# Patient Record
Sex: MALE | Race: WHITE | NOT HISPANIC OR LATINO | Employment: OTHER | URBAN - METROPOLITAN AREA
[De-identification: names, ages, dates, MRNs, and addresses within clinical notes are randomized per-mention and may not be internally consistent; named-entity substitution may affect disease eponyms.]

---

## 2024-11-08 ENCOUNTER — HOSPITAL ENCOUNTER (INPATIENT)
Facility: HOSPITAL | Age: 64
End: 2024-11-08
Attending: EMERGENCY MEDICINE | Admitting: STUDENT IN AN ORGANIZED HEALTH CARE EDUCATION/TRAINING PROGRAM
Payer: MEDICARE

## 2024-11-08 ENCOUNTER — APPOINTMENT (OUTPATIENT)
Dept: RADIOLOGY | Facility: HOSPITAL | Age: 64
DRG: 100 | End: 2024-11-08
Payer: MEDICARE

## 2024-11-08 ENCOUNTER — CLINICAL SUPPORT (OUTPATIENT)
Dept: EMERGENCY MEDICINE | Facility: HOSPITAL | Age: 64
DRG: 100 | End: 2024-11-08
Payer: MEDICARE

## 2024-11-08 ENCOUNTER — APPOINTMENT (OUTPATIENT)
Dept: NEUROLOGY | Facility: HOSPITAL | Age: 64
DRG: 100 | End: 2024-11-08
Payer: MEDICARE

## 2024-11-08 DIAGNOSIS — I10 HYPERTENSION, UNSPECIFIED TYPE: ICD-10-CM

## 2024-11-08 DIAGNOSIS — R56.9 SEIZURES (MULTI): ICD-10-CM

## 2024-11-08 DIAGNOSIS — R06.6 HICCUPS: ICD-10-CM

## 2024-11-08 DIAGNOSIS — R06.2 WHEEZING: ICD-10-CM

## 2024-11-08 DIAGNOSIS — R56.9 NEW ONSET SEIZURE (MULTI): Primary | ICD-10-CM

## 2024-11-08 LAB
ALBUMIN SERPL BCP-MCNC: 4.3 G/DL (ref 3.4–5)
ALP SERPL-CCNC: 123 U/L (ref 33–136)
ALT SERPL W P-5'-P-CCNC: 3 U/L (ref 10–52)
ANION GAP BLDV CALCULATED.4IONS-SCNC: 8 MMOL/L (ref 10–25)
ANION GAP SERPL CALC-SCNC: 14 MMOL/L (ref 10–20)
APTT PPP: 32 SECONDS (ref 27–38)
AST SERPL W P-5'-P-CCNC: 12 U/L (ref 9–39)
BASE EXCESS BLDV CALC-SCNC: 1.8 MMOL/L (ref -2–3)
BASOPHILS # BLD AUTO: 0.08 X10*3/UL (ref 0–0.1)
BASOPHILS NFR BLD AUTO: 1.3 %
BILIRUB SERPL-MCNC: 1.1 MG/DL (ref 0–1.2)
BODY TEMPERATURE: 37 DEGREES CELSIUS
BUN SERPL-MCNC: 10 MG/DL (ref 6–23)
CA-I BLDV-SCNC: 1.18 MMOL/L (ref 1.1–1.33)
CALCIUM SERPL-MCNC: 9.3 MG/DL (ref 8.6–10.6)
CARDIAC TROPONIN I PNL SERPL HS: 5 NG/L (ref 0–53)
CHLORIDE BLDV-SCNC: 102 MMOL/L (ref 98–107)
CHLORIDE SERPL-SCNC: 103 MMOL/L (ref 98–107)
CO2 SERPL-SCNC: 27 MMOL/L (ref 21–32)
CREAT SERPL-MCNC: 0.91 MG/DL (ref 0.5–1.3)
EGFRCR SERPLBLD CKD-EPI 2021: >90 ML/MIN/1.73M*2
EOSINOPHIL # BLD AUTO: 0.09 X10*3/UL (ref 0–0.7)
EOSINOPHIL NFR BLD AUTO: 1.5 %
ERYTHROCYTE [DISTWIDTH] IN BLOOD BY AUTOMATED COUNT: 12 % (ref 11.5–14.5)
GLUCOSE BLD MANUAL STRIP-MCNC: 90 MG/DL (ref 74–99)
GLUCOSE BLDV-MCNC: 98 MG/DL (ref 74–99)
GLUCOSE SERPL-MCNC: 91 MG/DL (ref 74–99)
HCO3 BLDV-SCNC: 29.1 MMOL/L (ref 22–26)
HCT VFR BLD AUTO: 45.1 % (ref 41–52)
HCT VFR BLD EST: 50 % (ref 41–52)
HGB BLD-MCNC: 16.4 G/DL (ref 13.5–17.5)
HGB BLDV-MCNC: 16.7 G/DL (ref 13.5–17.5)
HOLD SPECIMEN: NORMAL
IMM GRANULOCYTES # BLD AUTO: 0.02 X10*3/UL (ref 0–0.7)
IMM GRANULOCYTES NFR BLD AUTO: 0.3 % (ref 0–0.9)
INR PPP: 1.1 (ref 0.9–1.1)
LACTATE BLDV-SCNC: 1.8 MMOL/L (ref 0.4–2)
LYMPHOCYTES # BLD AUTO: 1.45 X10*3/UL (ref 1.2–4.8)
LYMPHOCYTES NFR BLD AUTO: 24.3 %
MCH RBC QN AUTO: 31.6 PG (ref 26–34)
MCHC RBC AUTO-ENTMCNC: 36.4 G/DL (ref 32–36)
MCV RBC AUTO: 87 FL (ref 80–100)
MONOCYTES # BLD AUTO: 0.39 X10*3/UL (ref 0.1–1)
MONOCYTES NFR BLD AUTO: 6.5 %
NEUTROPHILS # BLD AUTO: 3.93 X10*3/UL (ref 1.2–7.7)
NEUTROPHILS NFR BLD AUTO: 66.1 %
NRBC BLD-RTO: 0 /100 WBCS (ref 0–0)
OXYHGB MFR BLDV: 59.2 % (ref 45–75)
P OFFSET: 162 MS
P ONSET: 122 MS
PCO2 BLDV: 54 MM HG (ref 41–51)
PH BLDV: 7.34 PH (ref 7.33–7.43)
PLATELET # BLD AUTO: 220 X10*3/UL (ref 150–450)
PO2 BLDV: 37 MM HG (ref 35–45)
POTASSIUM BLDV-SCNC: 3.5 MMOL/L (ref 3.5–5.3)
POTASSIUM SERPL-SCNC: 3.6 MMOL/L (ref 3.5–5.3)
PROT SERPL-MCNC: 7.3 G/DL (ref 6.4–8.2)
PROTHROMBIN TIME: 12.1 SECONDS (ref 9.8–12.8)
Q ONSET: 222 MS
QRS COUNT: 12 BEATS
QRS DURATION: 82 MS
QT INTERVAL: 440 MS
QTC CALCULATION(BAZETT): 495 MS
QTC FREDERICIA: 475 MS
R AXIS: -29 DEGREES
RBC # BLD AUTO: 5.19 X10*6/UL (ref 4.5–5.9)
SAO2 % BLDV: 61 % (ref 45–75)
SODIUM BLDV-SCNC: 136 MMOL/L (ref 136–145)
SODIUM SERPL-SCNC: 140 MMOL/L (ref 136–145)
T AXIS: 54 DEGREES
T OFFSET: 442 MS
VENTRICULAR RATE: 76 BPM
WBC # BLD AUTO: 6 X10*3/UL (ref 4.4–11.3)

## 2024-11-08 PROCEDURE — 85610 PROTHROMBIN TIME: CPT

## 2024-11-08 PROCEDURE — 70450 CT HEAD/BRAIN W/O DYE: CPT

## 2024-11-08 PROCEDURE — 2500000004 HC RX 250 GENERAL PHARMACY W/ HCPCS (ALT 636 FOR OP/ED)

## 2024-11-08 PROCEDURE — 93010 ELECTROCARDIOGRAM REPORT: CPT

## 2024-11-08 PROCEDURE — 96375 TX/PRO/DX INJ NEW DRUG ADDON: CPT

## 2024-11-08 PROCEDURE — 84132 ASSAY OF SERUM POTASSIUM: CPT

## 2024-11-08 PROCEDURE — 99285 EMERGENCY DEPT VISIT HI MDM: CPT | Mod: 25

## 2024-11-08 PROCEDURE — 71045 X-RAY EXAM CHEST 1 VIEW: CPT | Performed by: RADIOLOGY

## 2024-11-08 PROCEDURE — 84484 ASSAY OF TROPONIN QUANT: CPT

## 2024-11-08 PROCEDURE — 99291 CRITICAL CARE FIRST HOUR: CPT | Mod: 25 | Performed by: EMERGENCY MEDICINE

## 2024-11-08 PROCEDURE — 85025 COMPLETE CBC W/AUTO DIFF WBC: CPT

## 2024-11-08 PROCEDURE — C9254 INJECTION, LACOSAMIDE: HCPCS

## 2024-11-08 PROCEDURE — 95816 EEG AWAKE AND DROWSY: CPT

## 2024-11-08 PROCEDURE — 82947 ASSAY GLUCOSE BLOOD QUANT: CPT

## 2024-11-08 PROCEDURE — 85730 THROMBOPLASTIN TIME PARTIAL: CPT

## 2024-11-08 PROCEDURE — 99285 EMERGENCY DEPT VISIT HI MDM: CPT | Performed by: EMERGENCY MEDICINE

## 2024-11-08 PROCEDURE — 95816 EEG AWAKE AND DROWSY: CPT | Performed by: STUDENT IN AN ORGANIZED HEALTH CARE EDUCATION/TRAINING PROGRAM

## 2024-11-08 PROCEDURE — 36415 COLL VENOUS BLD VENIPUNCTURE: CPT

## 2024-11-08 PROCEDURE — 0042T CT PERFUSION W IV CONTRAST: CPT

## 2024-11-08 PROCEDURE — 93005 ELECTROCARDIOGRAM TRACING: CPT

## 2024-11-08 PROCEDURE — 84075 ASSAY ALKALINE PHOSPHATASE: CPT

## 2024-11-08 PROCEDURE — 70498 CT ANGIOGRAPHY NECK: CPT

## 2024-11-08 PROCEDURE — 2060000001 HC INTERMEDIATE ICU ROOM DAILY

## 2024-11-08 PROCEDURE — 2550000001 HC RX 255 CONTRASTS: Performed by: EMERGENCY MEDICINE

## 2024-11-08 PROCEDURE — 96365 THER/PROPH/DIAG IV INF INIT: CPT

## 2024-11-08 PROCEDURE — 71045 X-RAY EXAM CHEST 1 VIEW: CPT

## 2024-11-08 PROCEDURE — 99223 1ST HOSP IP/OBS HIGH 75: CPT

## 2024-11-08 RX ORDER — AMLODIPINE BESYLATE 10 MG/1
10 TABLET ORAL DAILY
Status: DISPENSED | OUTPATIENT
Start: 2024-11-08

## 2024-11-08 RX ORDER — ENOXAPARIN SODIUM 100 MG/ML
40 INJECTION SUBCUTANEOUS EVERY 24 HOURS
Status: DISPENSED | OUTPATIENT
Start: 2024-11-08

## 2024-11-08 RX ORDER — ACETAMINOPHEN 325 MG/1
650 TABLET ORAL EVERY 4 HOURS PRN
Status: ACTIVE | OUTPATIENT
Start: 2024-11-08

## 2024-11-08 RX ORDER — ACETAMINOPHEN 160 MG/5ML
650 SOLUTION ORAL EVERY 4 HOURS PRN
Status: ACTIVE | OUTPATIENT
Start: 2024-11-08

## 2024-11-08 RX ORDER — MIRTAZAPINE 7.5 MG/1
7.5 TABLET, FILM COATED ORAL NIGHTLY
Status: ON HOLD | COMMUNITY

## 2024-11-08 RX ORDER — LEVETIRACETAM 10 MG/ML
1000 INJECTION INTRAVASCULAR EVERY 12 HOURS
Status: DISCONTINUED | OUTPATIENT
Start: 2024-11-08 | End: 2024-11-10

## 2024-11-08 RX ORDER — LISINOPRIL 10 MG/1
10 TABLET ORAL DAILY
Status: ON HOLD | COMMUNITY

## 2024-11-08 RX ORDER — ASPIRIN 81 MG/1
81 TABLET ORAL DAILY
Status: ON HOLD | COMMUNITY

## 2024-11-08 RX ORDER — LEVETIRACETAM 15 MG/ML
1500 INJECTION INTRAVASCULAR ONCE
Status: COMPLETED | OUTPATIENT
Start: 2024-11-08 | End: 2024-11-08

## 2024-11-08 RX ORDER — LORAZEPAM 2 MG/ML
INJECTION INTRAMUSCULAR
Status: COMPLETED
Start: 2024-11-08 | End: 2024-11-08

## 2024-11-08 RX ORDER — LEVETIRACETAM 5 MG/ML
500 INJECTION INTRAVASCULAR EVERY 12 HOURS
Status: DISCONTINUED | OUTPATIENT
Start: 2024-11-08 | End: 2024-11-08

## 2024-11-08 RX ORDER — LABETALOL HYDROCHLORIDE 5 MG/ML
10 INJECTION, SOLUTION INTRAVENOUS EVERY 10 MIN PRN
Status: DISPENSED | OUTPATIENT
Start: 2024-11-08

## 2024-11-08 RX ORDER — LEVETIRACETAM 15 MG/ML
INJECTION INTRAVASCULAR
Status: COMPLETED
Start: 2024-11-08 | End: 2024-11-08

## 2024-11-08 RX ORDER — POLYETHYLENE GLYCOL 3350 17 G/17G
17 POWDER, FOR SOLUTION ORAL DAILY
Status: DISPENSED | OUTPATIENT
Start: 2024-11-08

## 2024-11-08 RX ORDER — ASPIRIN 81 MG/1
81 TABLET ORAL DAILY
Status: DISPENSED | OUTPATIENT
Start: 2024-11-08

## 2024-11-08 RX ORDER — LISINOPRIL 10 MG/1
10 TABLET ORAL DAILY
Status: DISPENSED | OUTPATIENT
Start: 2024-11-08

## 2024-11-08 RX ORDER — HYDRALAZINE HYDROCHLORIDE 20 MG/ML
10 INJECTION INTRAMUSCULAR; INTRAVENOUS EVERY 10 MIN PRN
Status: DISPENSED | OUTPATIENT
Start: 2024-11-08

## 2024-11-08 RX ORDER — LORAZEPAM 2 MG/ML
2 INJECTION INTRAMUSCULAR EVERY 5 MIN PRN
Status: ACTIVE | OUTPATIENT
Start: 2024-11-08

## 2024-11-08 RX ORDER — LORAZEPAM 2 MG/ML
4 INJECTION INTRAMUSCULAR ONCE
Status: COMPLETED | OUTPATIENT
Start: 2024-11-08 | End: 2024-11-08

## 2024-11-08 RX ORDER — AMLODIPINE BESYLATE 10 MG/1
10 TABLET ORAL DAILY
Status: ON HOLD | COMMUNITY

## 2024-11-08 NOTE — SIGNIFICANT EVENT
STROKE NEUROLOGY CANCELLED BRAIN ATTACK (BAT) NOTE  Brain Attack called at 12:51 AM out of concern for acute onset aphasia that began this morning. Stroke team arrived at 12:54. LKW was determined to be 9am per daughter . BP was 188/121; BG was 90. Rapid neurologic evaluation revealed NIHSS 14 (breakdown below), however of note, patient has history of R MCA territory ischemic infarct in past and has residual weakness of Left upper and lower extremity. Patient was taken for CTH/CTA head and neck, no acute intracranial abnormalities appreciated however, did see hypodensities c/f chronic R MCA territory infarct as well as left PCA territory infarct given concern for possible infarct, CTP performed and unremarkable.     The patient was not a candidate for IV thrombolysis given unclear last known well, low concern for acute ischemic stroke and already significant chronic disability from prior infarct . Likewise not a candidate for endovascular mechanical thrombectomy given no evidence for LVO. Given no role for acute neurologic intervention, the Brain Attack protocol was cancelled. Clinical reasoning explained to the primary team, who agree with this plan.    Clinical picture gave rise to concern for a post-ictal aphasia although no seizure like activity reported per EMS family. Shortly after cancellation of BAT, generalized convulsions witnessed concerning for seizure activity by ED team, spontaneously resolved not requiring ativan. Will be seen by epilepsy team for seizure concern.    Linda Thomas MD PGY-3  [facility] Vascular Neurology & Neuro-Stroke Team  Stroke Team Pager [phone]      NIHSS:  1a  Level of consciousness: 0=alert; keenly responsive   1b. LOC questions:  2=Performs neither task correctly   1c. LOC commands: 2=Performs neither task correctly   2.  Best Gaze: 0=normal   3. Visual: 0=No visual loss   4. Facial Palsy: 0=Normal symmetric movement   5a. Motor Left Arm: 1=Drift, limb holds 90 (or 45) degrees but  drifts down before full 10 seconds: does not hit bed   5b.  Motor Right Arm: 1=Drift, limb holds 90 (or 45) degrees but drifts down before full 10 seconds: does not hit bed   6a. Motor Left Leg: 3=No effort against gravity, limb falls   6b  Motor Right Leg:  3=No effort against gravity, limb falls   7. Limb Ataxia: 0=Absent   8.  Sensory: 0=Normal; no sensory loss   9. Best Language:  2=Severe Aphasia: fragmentary expression, inference needed, cannot identify materials   10. Dysarthria: 0=Normal   11. Extinction and Inattention: 0=No abnormality          Total:   14

## 2024-11-08 NOTE — ED PROVIDER NOTES
HPI   Chief Complaint   Patient presents with    Altered Mental Status       Patient is a 63-year-old male with reported past medical history of 2 strokes approximately 2 years ago, prior spinal surgery, prior knee surgery with revision most recently in May 2023 presenting from home with concern for altered mental status.  Patient unable to contribute to history and initial history provided by EMS.  Subsequent history provided by patient's daughter via phone and in person.  Reportedly was seen by fiancé and was at his normal baseline at approximately 9 AM.  Reported baseline is that he has near complete vision loss in both eyes but still sees slightly.  Has chronic left-sided weakness with ability to move left upper extremity but unable to productively use or  anything with that arm.  Was able to walk with limp and help at physical therapy but drags the left foot.  Reportedly alert and oriented x 3 at baseline prior to 9 AM this morning.  Daughter endorses that symptoms first began with patient reported blurry vision.  She reports that subsequently he started making less and less sense, was looking around, did not know where he was.  Daughter did not report any clear tonic-clonic movements or seizure but did report on later interview that she was suspicious of seizure based on his presentation as similar to seeing postictal patients at work.  Daughter endorses that patient is not on aspirin, Plavix or blood thinners.  She does not report he has had any prior seizures.            Patient History   No past medical history on file.  No past surgical history on file.  No family history on file.  Social History     Tobacco Use    Smoking status: Not on file    Smokeless tobacco: Not on file   Substance Use Topics    Alcohol use: Not on file    Drug use: Not on file       Physical Exam   ED Triage Vitals [11/08/24 1248]   Temperature Heart Rate Respirations BP   35.8 °C (96.5 °F) 52 18 (!) 188/121      Pulse Ox Temp  src Heart Rate Source Patient Position   96 % -- -- --      BP Location FiO2 (%)     -- --       Physical Exam  Constitutional:       Appearance: Normal appearance.   HENT:      Head: Normocephalic and atraumatic.   Eyes:      Extraocular Movements: Extraocular movements intact.   Cardiovascular:      Rate and Rhythm: Normal rate and regular rhythm.   Pulmonary:      Effort: Pulmonary effort is normal.      Breath sounds: Normal breath sounds.   Abdominal:      Palpations: Abdomen is soft.   Musculoskeletal:         General: Normal range of motion.      Cervical back: Normal range of motion.   Skin:     General: Skin is warm and dry.   Neurological:      General: No focal deficit present.      Comments: Initially alert and oriented x 0.  Says okay or yeah in response to most questions.  Does not track gaze across room with gaze preference towards midline that is correctable with head movement.  Does not follow any commands.  Withdrew to needle pokedin left upper extremity.  Withdrew to nailbed stimulation with plantarflexion and knee flexion slightly in bilateral lower extremities.  Does seem to attempt to answer questions but unable to identify objects.  Does not smile on command but no visible facial asymmetry.  Does not show clear neglect towards either side but exam limited by aphasia.  Voice otherwise clear without dysarthria.   Psychiatric:         Mood and Affect: Mood normal.         Behavior: Behavior normal.           ED Course & MDM   ED Course as of 11/08/24 1554   Fri Nov 08, 2024   1541 WBC: 6.0 [BF]      ED Course User Index  [BF] Buddy Coleman MD         Diagnoses as of 11/08/24 1554   Seizures (Multi)   New onset seizure (Multi)                 No data recorded     Allan Coma Scale Score: 13 (11/08/24 1247 : Elizbaeth Loera RN)                           Medical Decision Making  Patient is a 63-year-old male with reported past medical history of 2 strokes approximately 2 years ago, prior spinal  surgery, prior knee surgery with revision most recently in May 2023 presenting from home with concern for altered mental status.  Patient with most prominent initial symptom of expressive aphasia and activated as a BAT.  CT head with evidence of prior strokes but no acute intracranial hemorrhage.  CT angio with no evidence of acute infarct and CT perfusion with perfusion abnormality favored to reflect sequela of remote infarcts.  Patient did have mild improvement of neuroexam over time in the ED and was witnessed correctly identifying objects such as a pen and answering occasional questions logically.  At approximately 1330 had witnessed tonic-clonic movement of bilateral upper and lower extremities.  Head was deviated to the right with right gaze preference during this time and patient unresponsive to any stimuli.  Episode lasted approximately 2 minutes.  Given 20 mg/kg Keppra subsequently given concern for seizure.  Did not have return to normal mental status and had subsequent witnessed episode of locking up of upper and lower extremities with complete unresponsiveness.  Given concern for status epilepticus without return to normal was given subsequent 3 mg of Keppra (total of 4.5g) and 4 mg of Ativan.  Case discussed with neurology-epilepsy service who recommended EEG as well as previously scheduled MRI.  There was concern about patient's airway status at this time as he was having poor respiratory effort after medications but appropriately ventilating on end-tidal CO2 and decision made not to intubate patient given impression that this is secondary to medications and expected to spontaneously improve.  Patient admitted to neurology-epilepsy service for further management.    Patient seen and discussed with Dr. Clinton Coleman MD, PhD  Emergency Medicine PGY3          Procedure  Procedures     Buddy Coleman MD  Resident  11/08/24 2292

## 2024-11-08 NOTE — H&P
History Of Present Illness    Josiah James is a 63 y.o. male with PMH of hypertension, osteoarthritis, prior R parietal and left occipital strokes in 2022 (unknown etiology), presented initially with encephalopathy, had 2 episodes of generalized tonic clonic in the ED. Epilepsy consulted for seizure management.      Per daughter, he woke up fine this morning and spoke to his fiance. Later in the morning his daughter head him yelling and cursing from his room. He called out to his fiance's son who doesn't live with them. He didn't recognize the daughter initially. He was not oriented to month and staring around confused. She called EMS.      In the ED he was thought to have expressive aphasia. BAT was called at 12:51. CT head showed old infarcts in the R MCA and left PCA territory. CT perfusion was unremarkable. Afterwards ED staff noticed 2 generalized tonic events. The first one self aborted after 2 minutes. The second lasted longer and he received 4 mg ativan with 4.5 g keppra load.       His daughter and chart review mention brief episodes of left arm shaking in Oct 2023. Workup was negative for seizures and it was thought to be due to Wellbutrin initiation.       In the ED:   -202/105-121, HR: 82, 95% on RA  Lactate 1.8, WBC 6, Hgb 16.4, Na 140      Home medications:   ASA 81 mg   Atorvastatin 40 mg Daily  Lisinopril 10 mg  Amlodipine 10 mg     Past Medical History  As above   Surgical History  As above  Social History  Lives with daughter, dependent with ADLs   Alcohol occasionally, Former smoker     Allergies  Patient has no allergy information on record.  (Not in a hospital admission)      Review of Systems  Neurological Exam  Physical Exam  Last Recorded Vitals  Blood pressure (!) 190/134, pulse 80, temperature 35.8 °C (96.5 °F), resp. rate 10, SpO2 98%.    GENERAL APPEARANCE: No acute distress.        MENTAL STATE:   Pt awake, alert but uncooperative with examination.   Recent and remote memory were  impaired.  Attention span and concentration were impaired. Language testing was unable to be performed. The patient could not correctly interpret a picture. General fund of knowledge impaired.     OPHTHALMOSCOPIC:   Unable to assess due to limited pt cooperation    CRANIAL NERVES:   CN 2   Difficult to assess due to limited pt cooperation, no clear blink to threat noted.  CN 3, 4, 6   Pupils round, 4 mm in diameter, equally reactive to light. Lids symmetric; no ptosis.   No nystagmus appreciated.   CN 5   Unable to assess due to limited pt cooperation  CN 7   No facial droop seen  CN 8   Unable to assess due to limited pt cooperation  CN 9   Unable to assess due to limited pt cooperation  CN 11   Unable to assess due to limited pt cooperation  CN 12   Unable to assess due to limited pt cooperation    MOTOR:   Mild increase of tone in the LUE  No fasciculations, tremor or other abnormal movements were present.   Unable to perform formal strength testing due to limited pt cooperation. However withdraws to nox stim (R>L)  No clonus    REFLEXES:                       R          L  BR:               1         1  Biceps:         1          1  Knee:            0          0  Ankle:          1         1    Babinski: toes mute to plantar stimulation on the right. Upgoing on the left   No clonus.    SENSORY:   Unable to fully assess due to pt's impaired mental status.  Withdraws to noxious stimuli throughout     COORDINATION:    Unable to assess due to pt's impaired mental status     GAIT:   Unable to assess due to pt's impaired mental status     Relevant Results    Scheduled medications  amLODIPine, 10 mg, oral, Daily  aspirin, 81 mg, oral, Daily  enoxaparin, 40 mg, subcutaneous, q24h  levETIRAcetam, 500 mg, intravenous, q12h  lisinopril, 10 mg, oral, Daily      Continuous medications     PRN medications  PRN medications: acetaminophen **OR** acetaminophen, hydrALAZINE, labetaloL, LORazepam    Results for orders placed or  performed during the hospital encounter of 11/08/24 (from the past 24 hours)   POCT GLUCOSE   Result Value Ref Range    POCT Glucose 90 74 - 99 mg/dL   Blood Gas Venous Full Panel Unsolicited   Result Value Ref Range    POCT pH, Venous 7.34 7.33 - 7.43 pH    POCT pCO2, Venous 54 (H) 41 - 51 mm Hg    POCT pO2, Venous 37 35 - 45 mm Hg    POCT SO2, Venous 61 45 - 75 %    POCT Oxy Hemoglobin, Venous 59.2 45.0 - 75.0 %    POCT Hematocrit Calculated, Venous 50.0 41.0 - 52.0 %    POCT Sodium, Venous 136 136 - 145 mmol/L    POCT Potassium, Venous 3.5 3.5 - 5.3 mmol/L    POCT Chloride, Venous 102 98 - 107 mmol/L    POCT Ionized Calicum, Venous 1.18 1.10 - 1.33 mmol/L    POCT Glucose, Venous 98 74 - 99 mg/dL    POCT Lactate, Venous 1.8 0.4 - 2.0 mmol/L    POCT Base Excess, Venous 1.8 -2.0 - 3.0 mmol/L    POCT HCO3 Calculated, Venous 29.1 (H) 22.0 - 26.0 mmol/L    POCT Hemoglobin, Venous 16.7 13.5 - 17.5 g/dL    POCT Anion Gap, Venous 8.0 (L) 10.0 - 25.0 mmol/L    Patient Temperature 37.0 degrees Celsius   CBC and Auto Differential   Result Value Ref Range    WBC 6.0 4.4 - 11.3 x10*3/uL    nRBC 0.0 0.0 - 0.0 /100 WBCs    RBC 5.19 4.50 - 5.90 x10*6/uL    Hemoglobin 16.4 13.5 - 17.5 g/dL    Hematocrit 45.1 41.0 - 52.0 %    MCV 87 80 - 100 fL    MCH 31.6 26.0 - 34.0 pg    MCHC 36.4 (H) 32.0 - 36.0 g/dL    RDW 12.0 11.5 - 14.5 %    Platelets 220 150 - 450 x10*3/uL    Neutrophils % 66.1 40.0 - 80.0 %    Immature Granulocytes %, Automated 0.3 0.0 - 0.9 %    Lymphocytes % 24.3 13.0 - 44.0 %    Monocytes % 6.5 2.0 - 10.0 %    Eosinophils % 1.5 0.0 - 6.0 %    Basophils % 1.3 0.0 - 2.0 %    Neutrophils Absolute 3.93 1.20 - 7.70 x10*3/uL    Immature Granulocytes Absolute, Automated 0.02 0.00 - 0.70 x10*3/uL    Lymphocytes Absolute 1.45 1.20 - 4.80 x10*3/uL    Monocytes Absolute 0.39 0.10 - 1.00 x10*3/uL    Eosinophils Absolute 0.09 0.00 - 0.70 x10*3/uL    Basophils Absolute 0.08 0.00 - 0.10 x10*3/uL   Comprehensive metabolic panel    Result Value Ref Range    Glucose 91 74 - 99 mg/dL    Sodium 140 136 - 145 mmol/L    Potassium 3.6 3.5 - 5.3 mmol/L    Chloride 103 98 - 107 mmol/L    Bicarbonate 27 21 - 32 mmol/L    Anion Gap 14 10 - 20 mmol/L    Urea Nitrogen 10 6 - 23 mg/dL    Creatinine 0.91 0.50 - 1.30 mg/dL    eGFR >90 >60 mL/min/1.73m*2    Calcium 9.3 8.6 - 10.6 mg/dL    Albumin 4.3 3.4 - 5.0 g/dL    Alkaline Phosphatase 123 33 - 136 U/L    Total Protein 7.3 6.4 - 8.2 g/dL    AST 12 9 - 39 U/L    Bilirubin, Total 1.1 0.0 - 1.2 mg/dL    ALT 3 (L) 10 - 52 U/L   Troponin I, High Sensitivity   Result Value Ref Range    Troponin I, High Sensitivity (CMC) 5 0 - 53 ng/L   Protime-INR   Result Value Ref Range    Protime 12.1 9.8 - 12.8 seconds    INR 1.1 0.9 - 1.1   APTT   Result Value Ref Range    aPTT 32 27 - 38 seconds   Light Blue Top   Result Value Ref Range    Extra Tube Hold for add-ons.    Lavender Top   Result Value Ref Range    Extra Tube Hold for add-ons.    Gray Top   Result Value Ref Range    Extra Tube Hold for add-ons.    PST Top   Result Value Ref Range    Extra Tube Hold for add-ons.      CT brain attack head wo IV contrast    Result Date: 11/8/2024  CT head: 1. No acute intracranial abnormality or mass effect. 2. Remote infarcts of the bilateral parietal and left occipital lobes.   CTA head and neck: 1. No large vessel occlusion or significant stenosis of the intracranial vessels. 2. 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 3. No significant stenosis of the cervical vessels.   CT perfusion:   Perfusion abnormality within the right parietal region with mismatch ratio of 1.1. Findings are favored to reflect sequelae of remote infarct.   I personally reviewed the images/study and I agree with the findings as stated by Eleazar Monreal MD. This study was interpreted at [facility], [place], [place].   MACRO: None   Signed by: Octavio  Paul 11/8/2024 1:50 PM Dictation workstation:   [id number]            IAssessment/Plan   Josiah Kelley is a 63 y.o. male with PMH of hypertension, osteoarthritis, prior R parietal and left occipital strokes in 2022 after etiology unknown, presented initially with encephalopathy, had 2 episodes of generalized tonic clonic in the ED. He was confused earlier in the day, daughter called EMS. Was found to have expressive aphasia in the ED. BAT activated but no evidence of stroke. He had 2 episodes of seizure activity after the BAT. S/p 4 mg ativan and 4.5 g keppra. Encephalopathic on exam but his left side is weaker which fits with his baseline. His presentation is concerning for seizures given his old strokes. However, given his encephalopathy earlier in the day with elevated blood pressure is concerning with other processes like PRESS.       # Seizures   # Encephalopathy  4D Classification of the Paroxysmal Episodes:    Epileptic  Episodes semiology: Version -> GTC  Frequency: Twice day of admission  History of Status Epilepticus: No  Localization: Unknown  Etiology: Likely prior R parietal and left occipital infarcts   Co-morbidities: HTN, HLD    :: S/p 4 mg ativan + 4.5 g Keppra  - CTH and CTP showing no acute process  - MRI brain w/o contrast  - Routine EEG while in the ED then cvEEG after MRI brain   - Keppra 500 mg BID    # HTN  - SBP < 180   - IV hydralazine and labetalol pushes PRN  - Continue home meds amlodipine 10 mg daily and lisinopril 10 mg daily  - Cardene drip if required multiple PRNs  - Cont. ASA 81 mg daily    F: None  E: Replete PRN  N: NPO    Last BM:       GIppx: NA    DVTppx: Lovenox    Code Status: Full Code   Emergency contact: DYLLAN KELLEY (Daughter)  [phone]     Patient discussed and seen by attending physician Dr. Rodney Patel MD  Neurology Resident PGY-3

## 2024-11-08 NOTE — ED TRIAGE NOTES
Pt presents to ED via EMS for altered mental status. Per daughter pt was acting strange this morning with aggressive behavior. Pt's fiance states his behavior was normal earlier in the morning. No clear LKW was provided from EMS or family. History remains unclear as patient is unreliable and no previous charts are populating. Known hx of surgery in May for hip replacement at which time patient had 2 stroke with left sided deficits. According to EMS pt is non compliant with some medications though was unsure of which.   A BAT was paged at 12:49. Pt has receptive aphasia and is unable to answer questions asked by providers. Pt has a GCS of 13, see documentation. Pt responds to pain noted on LUE and bilateral lower extremities. No response to pain on RUE.  per EMS. 20g IV obtained via EMS. Pt is hypertensive with a BP of 188/121 and SB with a heart rate of 52.    H Plasty Text: Given the location of the defect, shape of the defect and the proximity to free margins a H-plasty was deemed most appropriate for repair.  Using a sterile surgical marker, the appropriate advancement arms of the H-plasty were drawn incorporating the defect and placing the expected incisions within the relaxed skin tension lines where possible. The area thus outlined was incised deep to adipose tissue with a #15 scalpel blade. The skin margins were undermined to an appropriate distance in all directions utilizing iris scissors.  The opposing advancement arms were then advanced into place in opposite direction and anchored with interrupted buried subcutaneous sutures.

## 2024-11-09 ENCOUNTER — APPOINTMENT (OUTPATIENT)
Dept: NEUROLOGY | Facility: HOSPITAL | Age: 64
DRG: 100 | End: 2024-11-09
Payer: MEDICARE

## 2024-11-09 ENCOUNTER — APPOINTMENT (OUTPATIENT)
Dept: RADIOLOGY | Facility: HOSPITAL | Age: 64
DRG: 100 | End: 2024-11-09
Payer: MEDICARE

## 2024-11-09 LAB
ANION GAP SERPL CALC-SCNC: 19 MMOL/L (ref 10–20)
BASOPHILS # BLD AUTO: 0.01 X10*3/UL (ref 0–0.1)
BASOPHILS NFR BLD AUTO: 0.1 %
BUN SERPL-MCNC: 16 MG/DL (ref 6–23)
CALCIUM SERPL-MCNC: 9.3 MG/DL (ref 8.6–10.6)
CHLORIDE SERPL-SCNC: 99 MMOL/L (ref 98–107)
CO2 SERPL-SCNC: 26 MMOL/L (ref 21–32)
CREAT SERPL-MCNC: 1.15 MG/DL (ref 0.5–1.3)
EGFRCR SERPLBLD CKD-EPI 2021: 72 ML/MIN/1.73M*2
EOSINOPHIL # BLD AUTO: 0.04 X10*3/UL (ref 0–0.7)
EOSINOPHIL NFR BLD AUTO: 0.3 %
ERYTHROCYTE [DISTWIDTH] IN BLOOD BY AUTOMATED COUNT: 12.6 % (ref 11.5–14.5)
GLUCOSE SERPL-MCNC: 141 MG/DL (ref 74–99)
HCT VFR BLD AUTO: 48.1 % (ref 41–52)
HGB BLD-MCNC: 16.7 G/DL (ref 13.5–17.5)
IMM GRANULOCYTES # BLD AUTO: 0.07 X10*3/UL (ref 0–0.7)
IMM GRANULOCYTES NFR BLD AUTO: 0.5 % (ref 0–0.9)
LYMPHOCYTES # BLD AUTO: 0.54 X10*3/UL (ref 1.2–4.8)
LYMPHOCYTES NFR BLD AUTO: 4.1 %
MCH RBC QN AUTO: 31.5 PG (ref 26–34)
MCHC RBC AUTO-ENTMCNC: 34.7 G/DL (ref 32–36)
MCV RBC AUTO: 91 FL (ref 80–100)
MONOCYTES # BLD AUTO: 0.42 X10*3/UL (ref 0.1–1)
MONOCYTES NFR BLD AUTO: 3.2 %
NEUTROPHILS # BLD AUTO: 12.22 X10*3/UL (ref 1.2–7.7)
NEUTROPHILS NFR BLD AUTO: 91.8 %
NRBC BLD-RTO: 0 /100 WBCS (ref 0–0)
PLATELET # BLD AUTO: 262 X10*3/UL (ref 150–450)
POTASSIUM SERPL-SCNC: 3 MMOL/L (ref 3.5–5.3)
RBC # BLD AUTO: 5.3 X10*6/UL (ref 4.5–5.9)
SODIUM SERPL-SCNC: 141 MMOL/L (ref 136–145)
WBC # BLD AUTO: 13.3 X10*3/UL (ref 4.4–11.3)

## 2024-11-09 PROCEDURE — 2500000004 HC RX 250 GENERAL PHARMACY W/ HCPCS (ALT 636 FOR OP/ED)

## 2024-11-09 PROCEDURE — 70551 MRI BRAIN STEM W/O DYE: CPT | Mod: RSC

## 2024-11-09 PROCEDURE — 2060000001 HC INTERMEDIATE ICU ROOM DAILY

## 2024-11-09 PROCEDURE — 2500000005 HC RX 250 GENERAL PHARMACY W/O HCPCS

## 2024-11-09 PROCEDURE — C9254 INJECTION, LACOSAMIDE: HCPCS

## 2024-11-09 PROCEDURE — 85025 COMPLETE CBC W/AUTO DIFF WBC: CPT

## 2024-11-09 PROCEDURE — 87040 BLOOD CULTURE FOR BACTERIA: CPT

## 2024-11-09 PROCEDURE — 99232 SBSQ HOSP IP/OBS MODERATE 35: CPT

## 2024-11-09 PROCEDURE — 2500000001 HC RX 250 WO HCPCS SELF ADMINISTERED DRUGS (ALT 637 FOR MEDICARE OP)

## 2024-11-09 PROCEDURE — 80048 BASIC METABOLIC PNL TOTAL CA: CPT

## 2024-11-09 PROCEDURE — 36415 COLL VENOUS BLD VENIPUNCTURE: CPT

## 2024-11-09 RX ORDER — LORAZEPAM 2 MG/ML
1 INJECTION INTRAMUSCULAR ONCE
Status: COMPLETED | OUTPATIENT
Start: 2024-11-09 | End: 2024-11-09

## 2024-11-09 RX ORDER — ONDANSETRON HYDROCHLORIDE 2 MG/ML
4 INJECTION, SOLUTION INTRAVENOUS ONCE
Status: COMPLETED | OUTPATIENT
Start: 2024-11-09 | End: 2024-11-09

## 2024-11-09 RX ORDER — ONDANSETRON HYDROCHLORIDE 2 MG/ML
4 INJECTION, SOLUTION INTRAVENOUS EVERY 8 HOURS PRN
Status: DISPENSED | OUTPATIENT
Start: 2024-11-09

## 2024-11-09 RX ORDER — SODIUM CHLORIDE 9 MG/ML
75 INJECTION, SOLUTION INTRAVENOUS CONTINUOUS
Status: DISCONTINUED | OUTPATIENT
Start: 2024-11-09 | End: 2024-11-10

## 2024-11-09 RX ORDER — ACETAMINOPHEN 500 MG
1000 TABLET ORAL EVERY 6 HOURS PRN
Status: ON HOLD | COMMUNITY

## 2024-11-09 ASSESSMENT — COGNITIVE AND FUNCTIONAL STATUS - GENERAL
MOVING TO AND FROM BED TO CHAIR: A LOT
HELP NEEDED FOR BATHING: TOTAL
DRESSING REGULAR UPPER BODY CLOTHING: TOTAL
CLIMB 3 TO 5 STEPS WITH RAILING: TOTAL
TURNING FROM BACK TO SIDE WHILE IN FLAT BAD: A LOT
TOILETING: TOTAL
PERSONAL GROOMING: TOTAL
MOBILITY SCORE: 10
STANDING UP FROM CHAIR USING ARMS: TOTAL
WALKING IN HOSPITAL ROOM: TOTAL
EATING MEALS: TOTAL
DAILY ACTIVITIY SCORE: 6
DRESSING REGULAR LOWER BODY CLOTHING: TOTAL
MOVING FROM LYING ON BACK TO SITTING ON SIDE OF FLAT BED WITH BEDRAILS: A LITTLE

## 2024-11-09 ASSESSMENT — PAIN SCALES - GENERAL
PAINLEVEL_OUTOF10: 0 - NO PAIN
PAINLEVEL_OUTOF10: 0 - NO PAIN

## 2024-11-09 ASSESSMENT — PAIN - FUNCTIONAL ASSESSMENT: PAIN_FUNCTIONAL_ASSESSMENT: 0-10

## 2024-11-09 ASSESSMENT — PAIN SCALES - WONG BAKER: WONGBAKER_NUMERICALRESPONSE: NO HURT

## 2024-11-09 NOTE — PROGRESS NOTES
Pharmacy Medication History Review    Josiah James is a 63 y.o. male admitted for New onset seizure (Multi). Pharmacy reviewed the patient's [id number] medications and allergies for accuracy.    Medications ADDED:  Tylenol   Medications CHANGED:  N/A  Medications REMOVED/NOT TAKING:   Pt is currently not taking any of his prescribed meds      The list below reflects the updated PTA list.   Prior to Admission Medications   Prescriptions Last Dose Informant   acetaminophen (Tylenol) 500 mg tablet Past Week Spouse/Significant Other   Sig: Take 2 tablets (1,000 mg) by mouth every 6 hours if needed for mild pain (1 - 3).   amLODIPine (Norvasc) 10 mg tablet Not Taking Spouse/Significant Other   Sig: Take 1 tablet (10 mg) by mouth once daily.   Patient not taking: Reported on 11/9/2024   aspirin 81 mg EC tablet Not Taking Spouse/Significant Other   Sig: Take 1 tablet (81 mg) by mouth once daily.   Patient not taking: Reported on 11/9/2024   lisinopril 10 mg tablet Not Taking Spouse/Significant Other   Sig: Take 1 tablet (10 mg) by mouth once daily.   Patient not taking: Reported on 11/9/2024   mirtazapine (Remeron) 7.5 mg tablet Not Taking Spouse/Significant Other   Sig: Take 1 tablet (7.5 mg) by mouth once daily at bedtime.   Patient not taking: Reported on 11/9/2024      Facility-Administered Medications: None        The list below reflects the updated allergy list. Please review each documented allergy for additional clarification and justification.  Allergies  Reviewed by Breanna Phoenix on 11/9/2024   No Known Allergies         Patient accepts M2B at discharge.     Sources:   [facility]  Pharmacy dispense history  Spouse  Chart Review     Additional Comments:  I called and spoke with Pts significant other (Latesha [phone]). She reported that Pt has not taken any of the prescribed meds listed since April or June due to needing a doctors appointment.   Pt has been putting off his doctors appointment due to  "refusal.       CANELO TIM PHOENIX  Pharmacy Technician  11/09/24     Secure Chat preferred   If no response call [phone] or Vocera \"Med Rec\"   "

## 2024-11-09 NOTE — PROGRESS NOTES
Josiah James is a 63 y.o. male on day 1 of admission presenting with New onset seizure (Multi).      Subjective   Routine EEG showed seizure activity. He was loaded with vimpat and increased keppra dose. More awake this morning and complaining of nausea and back pain (chronic issue).        Objective     Last Recorded Vitals  Blood pressure 153/89, pulse 81, temperature 35.8 °C (96.4 °F), temperature source Temporal, resp. rate 21, SpO2 97%.    Physical Exam  Neurological Exam    Awake, lethargic, limited cooperation, in mild distress  Quiet resprations on room air  Regular rate and rhythm  Oriented to self only. Attention and concentration impaired. Language fluent  VF difficult to assess given limited cooperation (has visual field deficit at baseline), EOM full, mild left facial droop  Moves extremities spontaneously antigravity on the right, Left: 3/5 upper and 4/5 lower    Scheduled medications  amLODIPine, 10 mg, oral, Daily  aspirin, 81 mg, oral, Daily  enoxaparin, 40 mg, subcutaneous, q24h  lacosamide, 100 mg, intravenous, q12h  levETIRAcetam, 1,000 mg, intravenous, q12h  lisinopril, 10 mg, oral, Daily  polyethylene glycol, 17 g, oral, Daily      Continuous medications  sodium chloride 0.9%, 75 mL/hr, Last Rate: 75 mL/hr (11/09/24 1405)      PRN medications  PRN medications: acetaminophen **OR** acetaminophen, hydrALAZINE, labetaloL, LORazepam, ondansetron    Results for orders placed or performed during the hospital encounter of 11/08/24 (from the past 24 hours)   Light Blue Top   Result Value Ref Range    Extra Tube Hold for add-ons.    Lavender Top   Result Value Ref Range    Extra Tube Hold for add-ons.    Gray Top   Result Value Ref Range    Extra Tube Hold for add-ons.    PST Top   Result Value Ref Range    Extra Tube Hold for add-ons.    ECG 12 lead   Result Value Ref Range    Ventricular Rate 76 BPM    QRS Duration 82 ms    QT Interval 440 ms    QTC Calculation(Bazett) 495 ms    R Axis -29 degrees    T  Axis 54 degrees    QRS Count 12 beats    Q Onset 222 ms    P Onset 122 ms    P Offset 162 ms    T Offset 442 ms    QTC Fredericia 475 ms   Basic metabolic panel   Result Value Ref Range    Glucose 141 (H) 74 - 99 mg/dL    Sodium 141 136 - 145 mmol/L    Potassium 3.0 (L) 3.5 - 5.3 mmol/L    Chloride 99 98 - 107 mmol/L    Bicarbonate 26 21 - 32 mmol/L    Anion Gap 19 10 - 20 mmol/L    Urea Nitrogen 16 6 - 23 mg/dL    Creatinine 1.15 0.50 - 1.30 mg/dL    eGFR 72 >60 mL/min/1.73m*2    Calcium 9.3 8.6 - 10.6 mg/dL   CBC and Auto Differential   Result Value Ref Range    WBC 13.3 (H) 4.4 - 11.3 x10*3/uL    nRBC 0.0 0.0 - 0.0 /100 WBCs    RBC 5.30 4.50 - 5.90 x10*6/uL    Hemoglobin 16.7 13.5 - 17.5 g/dL    Hematocrit 48.1 41.0 - 52.0 %    MCV 91 80 - 100 fL    MCH 31.5 26.0 - 34.0 pg    MCHC 34.7 32.0 - 36.0 g/dL    RDW 12.6 11.5 - 14.5 %    Platelets 262 150 - 450 x10*3/uL    Neutrophils % 91.8 40.0 - 80.0 %    Immature Granulocytes %, Automated 0.5 0.0 - 0.9 %    Lymphocytes % 4.1 13.0 - 44.0 %    Monocytes % 3.2 2.0 - 10.0 %    Eosinophils % 0.3 0.0 - 6.0 %    Basophils % 0.1 0.0 - 2.0 %    Neutrophils Absolute 12.22 (H) 1.20 - 7.70 x10*3/uL    Immature Granulocytes Absolute, Automated 0.07 0.00 - 0.70 x10*3/uL    Lymphocytes Absolute 0.54 (L) 1.20 - 4.80 x10*3/uL    Monocytes Absolute 0.42 0.10 - 1.00 x10*3/uL    Eosinophils Absolute 0.04 0.00 - 0.70 x10*3/uL    Basophils Absolute 0.01 0.00 - 0.10 x10*3/uL       Assessment/Plan   Josiah James is a 63 y.o. male with PMH of hypertension, osteoarthritis, prior R parietal and left occipital strokes in 2022 after etiology unknown, presented initially with encephalopathy, had 2 episodes of generalized tonic clonic in the ED. He was confused earlier in the day, daughter called EMS. Was found to have expressive aphasia in the ED. BAT activated but no evidence of stroke. He had 2 episodes of seizure activity after the BAT. S/p 4 mg ativan and 4.5 g keppra. Encephalopathic on exam  but his left side is weaker which fits with his baseline. His presentation is concerning for seizures given his old strokes. Routine EEG showed seizure activity, vimpat was added and keppra was increase. Mental status improved since admission. Unfortunately patient wasn't able to tolerate MRI brain due to agitation.         # Seizures   # Encephalopathy  4D Classification of the Paroxysmal Episodes:     Epileptic  Episodes semiology: Version -> GTC  Frequency: Twice day of admission  History of Status Epilepticus: No  Localization: Unknown  Etiology: Likely prior R parietal and left occipital infarcts   Co-morbidities: HTN, HLD     :: S/p 4 mg ativan + 4.5 g Keppra  - CTH and CTP showing no acute process  - cvEEG  - Keppra 1000 mg BID  - Vimpat 100 mg BID   - MRI brain w/o contrast when patient can tolerate     # HTN  - SBP < 180   - IV hydralazine and labetalol pushes PRN  - Continue home meds amlodipine 10 mg daily and lisinopril 10 mg daily  - Cardene drip if required multiple PRNs  - Cont. ASA 81 mg daily     # Leukocytosis   ::WBC 13.3 on 11/9 from 6 the day prior  - Afebrile  - No infectious process seen on CXR  - BCX and UA with reflex ordered  - F/U repeat CBC  - Low threshold to start empiric antibiotics if febrile    F: 75cc/hr  E: Replete PRN  N: NPO due to mentation     Last BM: PTA          GIppx: NA     DVTppx: Lovenox     Code Status: Full Code   Emergency contact: DYLLAN KELLEY (Daughter)  [phone]      Patient discussed and seen by attending physician Dr. Rodney Patel MD  Neurology Resident PGY-3

## 2024-11-09 NOTE — CARE PLAN
The patient's goals for the shift include      The clinical goals for the shift include  patient will remain safe and free from falls      Problem: Skin  Goal: Decreased wound size/increased tissue granulation at next dressing change  Outcome: Progressing  Flowsheets (Taken 11/9/2024 1444)  Decreased wound size/increased tissue granulation at next dressing change:   Promote sleep for wound healing   Protective dressings over bony prominences     Problem: Skin  Goal: Prevent/manage excess moisture  Outcome: Progressing  Flowsheets (Taken 11/9/2024 1444)  Prevent/manage excess moisture:   Cleanse incontinence/protect with barrier cream   Monitor for/manage infection if present   Follow provider orders for dressing changes   Moisturize dry skin     Problem: Skin  Goal: Prevent/minimize sheer/friction injuries  Outcome: Progressing  Flowsheets (Taken 11/9/2024 1444)  Prevent/minimize sheer/friction injuries:   Complete micro-shifts as needed if patient unable. Adjust patient position to relieve pressure points, not a full turn   Use pull sheet   HOB 30 degrees or less   Turn/reposition every 2 hours/use positioning/transfer devices     Problem: Skin  Goal: Promote/optimize nutrition  Outcome: Progressing  Flowsheets (Taken 11/9/2024 1444)  Promote/optimize nutrition: Assist with feeding     Problem: Safety - Adult  Goal: Free from fall injury  Outcome: Progressing  Flowsheets (Taken 11/9/2024 1444)  Free from fall injury:   Instruct family/caregiver on patient safety   Based on caregiver fall risk screen, instruct family/caregiver to ask for assistance with transferring infant if caregiver noted to have fall risk factors

## 2024-11-09 NOTE — SIGNIFICANT EVENT
Patient has an implanted [employer] LIN[employer] II LNQ22 loop recorder. Placed on 4/18/2022.    Marbin Patel MD  Neurology Resident PGY-3  Epilepsy [id number]

## 2024-11-10 LAB
ALBUMIN SERPL BCP-MCNC: 4.1 G/DL (ref 3.4–5)
ALBUMIN SERPL BCP-MCNC: 4.2 G/DL (ref 3.4–5)
ANION GAP SERPL CALC-SCNC: 15 MMOL/L (ref 10–20)
ANION GAP SERPL CALC-SCNC: 15 MMOL/L (ref 10–20)
BACTERIA BLD CULT: NORMAL
BACTERIA BLD CULT: NORMAL
BASOPHILS # BLD AUTO: 0.01 X10*3/UL (ref 0–0.1)
BASOPHILS NFR BLD AUTO: 0.1 %
BUN SERPL-MCNC: 23 MG/DL (ref 6–23)
BUN SERPL-MCNC: 24 MG/DL (ref 6–23)
CALCIUM SERPL-MCNC: 9.1 MG/DL (ref 8.6–10.6)
CALCIUM SERPL-MCNC: 9.2 MG/DL (ref 8.6–10.6)
CHLORIDE SERPL-SCNC: 102 MMOL/L (ref 98–107)
CHLORIDE SERPL-SCNC: 103 MMOL/L (ref 98–107)
CO2 SERPL-SCNC: 26 MMOL/L (ref 21–32)
CO2 SERPL-SCNC: 26 MMOL/L (ref 21–32)
CREAT SERPL-MCNC: 1.01 MG/DL (ref 0.5–1.3)
CREAT SERPL-MCNC: 1.01 MG/DL (ref 0.5–1.3)
EGFRCR SERPLBLD CKD-EPI 2021: 84 ML/MIN/1.73M*2
EGFRCR SERPLBLD CKD-EPI 2021: 84 ML/MIN/1.73M*2
EOSINOPHIL # BLD AUTO: 0.03 X10*3/UL (ref 0–0.7)
EOSINOPHIL NFR BLD AUTO: 0.3 %
ERYTHROCYTE [DISTWIDTH] IN BLOOD BY AUTOMATED COUNT: 12.5 % (ref 11.5–14.5)
ERYTHROCYTE [DISTWIDTH] IN BLOOD BY AUTOMATED COUNT: 12.5 % (ref 11.5–14.5)
GLUCOSE SERPL-MCNC: 92 MG/DL (ref 74–99)
GLUCOSE SERPL-MCNC: 95 MG/DL (ref 74–99)
HCT VFR BLD AUTO: 45.6 % (ref 41–52)
HCT VFR BLD AUTO: 45.7 % (ref 41–52)
HGB BLD-MCNC: 15.8 G/DL (ref 13.5–17.5)
HGB BLD-MCNC: 15.8 G/DL (ref 13.5–17.5)
IMM GRANULOCYTES # BLD AUTO: 0.07 X10*3/UL (ref 0–0.7)
IMM GRANULOCYTES NFR BLD AUTO: 0.7 % (ref 0–0.9)
LYMPHOCYTES # BLD AUTO: 0.76 X10*3/UL (ref 1.2–4.8)
LYMPHOCYTES NFR BLD AUTO: 7.6 %
MAGNESIUM SERPL-MCNC: 2.11 MG/DL (ref 1.6–2.4)
MAGNESIUM SERPL-MCNC: 2.2 MG/DL (ref 1.6–2.4)
MCH RBC QN AUTO: 31 PG (ref 26–34)
MCH RBC QN AUTO: 31.4 PG (ref 26–34)
MCHC RBC AUTO-ENTMCNC: 34.6 G/DL (ref 32–36)
MCHC RBC AUTO-ENTMCNC: 34.6 G/DL (ref 32–36)
MCV RBC AUTO: 90 FL (ref 80–100)
MCV RBC AUTO: 91 FL (ref 80–100)
MONOCYTES # BLD AUTO: 0.66 X10*3/UL (ref 0.1–1)
MONOCYTES NFR BLD AUTO: 6.6 %
NEUTROPHILS # BLD AUTO: 8.44 X10*3/UL (ref 1.2–7.7)
NEUTROPHILS NFR BLD AUTO: 84.7 %
NRBC BLD-RTO: 0 /100 WBCS (ref 0–0)
NRBC BLD-RTO: 0 /100 WBCS (ref 0–0)
PHOSPHATE SERPL-MCNC: 1.8 MG/DL (ref 2.5–4.9)
PHOSPHATE SERPL-MCNC: 1.8 MG/DL (ref 2.5–4.9)
PLATELET # BLD AUTO: 220 X10*3/UL (ref 150–450)
PLATELET # BLD AUTO: 233 X10*3/UL (ref 150–450)
POTASSIUM SERPL-SCNC: 3.2 MMOL/L (ref 3.5–5.3)
POTASSIUM SERPL-SCNC: 3.2 MMOL/L (ref 3.5–5.3)
RBC # BLD AUTO: 5.03 X10*6/UL (ref 4.5–5.9)
RBC # BLD AUTO: 5.09 X10*6/UL (ref 4.5–5.9)
SODIUM SERPL-SCNC: 140 MMOL/L (ref 136–145)
SODIUM SERPL-SCNC: 141 MMOL/L (ref 136–145)
WBC # BLD AUTO: 10 X10*3/UL (ref 4.4–11.3)
WBC # BLD AUTO: 11 X10*3/UL (ref 4.4–11.3)

## 2024-11-10 PROCEDURE — 2500000004 HC RX 250 GENERAL PHARMACY W/ HCPCS (ALT 636 FOR OP/ED)

## 2024-11-10 PROCEDURE — 80069 RENAL FUNCTION PANEL: CPT

## 2024-11-10 PROCEDURE — 85027 COMPLETE CBC AUTOMATED: CPT

## 2024-11-10 PROCEDURE — 36415 COLL VENOUS BLD VENIPUNCTURE: CPT

## 2024-11-10 PROCEDURE — 99233 SBSQ HOSP IP/OBS HIGH 50: CPT

## 2024-11-10 PROCEDURE — 85025 COMPLETE CBC W/AUTO DIFF WBC: CPT

## 2024-11-10 PROCEDURE — 95715 VEEG EA 12-26HR INTMT MNTR: CPT

## 2024-11-10 PROCEDURE — 83735 ASSAY OF MAGNESIUM: CPT

## 2024-11-10 PROCEDURE — 2500000004 HC RX 250 GENERAL PHARMACY W/ HCPCS (ALT 636 FOR OP/ED): Performed by: STUDENT IN AN ORGANIZED HEALTH CARE EDUCATION/TRAINING PROGRAM

## 2024-11-10 PROCEDURE — 2500000001 HC RX 250 WO HCPCS SELF ADMINISTERED DRUGS (ALT 637 FOR MEDICARE OP)

## 2024-11-10 PROCEDURE — 2060000001 HC INTERMEDIATE ICU ROOM DAILY

## 2024-11-10 PROCEDURE — C9254 INJECTION, LACOSAMIDE: HCPCS

## 2024-11-10 PROCEDURE — 95720 EEG PHY/QHP EA INCR W/VEEG: CPT | Performed by: PSYCHIATRY & NEUROLOGY

## 2024-11-10 PROCEDURE — 2500000005 HC RX 250 GENERAL PHARMACY W/O HCPCS

## 2024-11-10 PROCEDURE — 95700 EEG CONT REC W/VID EEG TECH: CPT

## 2024-11-10 RX ORDER — POTASSIUM CHLORIDE 14.9 MG/ML
20 INJECTION INTRAVENOUS ONCE
Status: COMPLETED | OUTPATIENT
Start: 2024-11-10 | End: 2024-11-10

## 2024-11-10 RX ORDER — LEVETIRACETAM 5 MG/ML
500 INJECTION INTRAVASCULAR ONCE
Status: COMPLETED | OUTPATIENT
Start: 2024-11-10 | End: 2024-11-10

## 2024-11-10 RX ORDER — LEVETIRACETAM 15 MG/ML
1500 INJECTION INTRAVASCULAR EVERY 12 HOURS
Status: DISPENSED | OUTPATIENT
Start: 2024-11-10

## 2024-11-10 RX ORDER — LANOLIN ALCOHOL/MO/W.PET/CERES
100 CREAM (GRAM) TOPICAL DAILY
Status: DISPENSED | OUTPATIENT
Start: 2024-11-10

## 2024-11-10 ASSESSMENT — PAIN - FUNCTIONAL ASSESSMENT: PAIN_FUNCTIONAL_ASSESSMENT: 0-10

## 2024-11-10 ASSESSMENT — PAIN SCALES - GENERAL
PAINLEVEL_OUTOF10: 0 - NO PAIN
PAINLEVEL_OUTOF10: 0 - NO PAIN

## 2024-11-10 NOTE — CARE PLAN
The patient's goals for the shift include  getting adequate rest to promote healing.    The clinical goals for the shift include pt will remain safe and free from injury throughout the shift

## 2024-11-10 NOTE — CARE PLAN
The patient's goals for the shift include  getting rest.    The clinical goals for the shift include Patient will not have any signs of seizure activity throughout shift.      Problem: Skin  Goal: Participates in plan/prevention/treatment measures  Outcome: Progressing  Goal: Prevent/manage excess moisture  Outcome: Progressing  Goal: Prevent/minimize sheer/friction injuries  Outcome: Progressing  Goal: Promote/optimize nutrition  Outcome: Progressing  Goal: Promote skin healing  Outcome: Progressing     Problem: Pain - Adult  Goal: Verbalizes/displays adequate comfort level or baseline comfort level  Outcome: Progressing     Problem: Safety - Adult  Goal: Free from fall injury  Outcome: Progressing     Problem: Discharge Planning  Goal: Discharge to home or other facility with appropriate resources  Outcome: Progressing     Problem: Chronic Conditions and Co-morbidities  Goal: Patient's chronic conditions and co-morbidity symptoms are monitored and maintained or improved  Outcome: Progressing

## 2024-11-10 NOTE — PROGRESS NOTES
Josiah James is a 63 y.o. male on day 2 of admission presenting with New onset seizure (Multi).      Subjective   Increased keppra to 1500 mg BID. No acute overnight events. No new complains.    Objective     Last Recorded Vitals  Blood pressure (!) 179/114, pulse 56, temperature 36.7 °C (98.1 °F), temperature source Temporal, resp. rate 13, SpO2 95%.    Physical Exam  Neurological Exam    Awake, alert, cooperative, no distress  Quiet resprations on room air  Oriented to self and place only. Attention and concentration intact. Language fluent, no aphasia or dysarthria noticed.   Vision impaired (from prior strokes), EOM full, mild left facial droop  Moves extremities spontaneously antigravity on the right, Left: 4/5 upper and 4/5 lower    Scheduled medications  amLODIPine, 10 mg, oral, Daily  aspirin, 81 mg, oral, Daily  enoxaparin, 40 mg, subcutaneous, q24h  lacosamide, 100 mg, intravenous, q12h  levETIRAcetam, 1,500 mg, intravenous, q12h  lisinopril, 10 mg, oral, Daily  polyethylene glycol, 17 g, oral, Daily      Continuous medications  sodium chloride 0.9%, 75 mL/hr, Last Rate: 75 mL/hr (11/09/24 2203)      PRN medications  PRN medications: acetaminophen **OR** acetaminophen, hydrALAZINE, labetaloL, LORazepam, ondansetron    Results for orders placed or performed during the hospital encounter of 11/08/24 (from the past 24 hours)   Blood Culture    Specimen: Peripheral Venipuncture; Blood culture   Result Value Ref Range    Blood Culture Loaded on Instrument - Culture in progress    Blood Culture    Specimen: Peripheral Venipuncture; Blood culture   Result Value Ref Range    Blood Culture Loaded on Instrument - Culture in progress      cvEEG 11/8: This EEG shows left frontal Status epilepticus and is indicative of severe diffuse encephalopathy.         Assessment/Plan   Josiah James is a 63 y.o. male with PMH of hypertension, osteoarthritis, prior R parietal and left occipital strokes in 2022 etiology unknown,  presented initially with encephalopathy, had 2 episodes of generalized tonic clonic in the ED. He was confused earlier in the day, daughter called EMS. Was found to have expressive aphasia in the ED. BAT activated but no evidence of stroke. He had 2 episodes of seizure activity after the BAT. S/p 4 mg ativan and 4.5 g keppra. Encephalopathic on exam but his left side is weaker which fits with his baseline. His presentation is concerning for seizures given his old strokes. Routine EEG showed seizure activity, vimpat was added and keppra was increase. Mental status improving since admission.     Updates 11/10/24  - Keppra increased to 1500 mg BID overnight  - EEG since improved     # Seizures   # Encephalopathy  4D Classification of the Paroxysmal Episodes:     Epileptic  Episodes semiology: Version -> GTC  Frequency: Twice day of admission  History of Status Epilepticus: No  Localization: Unknown  Etiology: Likely prior R parietal and left occipital infarcts   Co-morbidities: HTN, HLD     :: S/p 4 mg ativan + 4.5 g Keppra  :: Mental status improving with seizure control  - CTH and CTP showing no acute process  - cvEEG  - Keppra 1500 mg BID  - Vimpat 100 mg BID   - MRI brain w/o contrast when patient can tolerate     # HTN  - SBP < 180   - IV hydralazine and labetalol pushes PRN  - Continue home meds amlodipine 10 mg daily and lisinopril 10 mg daily  - Cardene drip if required multiple PRNs  - Cont. ASA 81 mg daily     # Leukocytosis (resolved)  ::WBC 13.3 on 11/9 from 6, down to 10.   - Afebrile  - No infectious process seen on CXR  - BCX and UA with reflex ordered  - F/U repeat CBC  - Low threshold to start empiric antibiotics if febrile    F: N/A  E: Replete PRN  N: NPO due to mentation     Last BM:            GIppx: NA     DVTppx: Lovenox     Code Status: Full Code   Emergency contact: DYLLAN KELLEY (Daughter)  [phone]      Patient discussed and seen by attending physician Dr. Rodney Patel,  MD  Neurology Resident PGY-3

## 2024-11-11 ENCOUNTER — APPOINTMENT (OUTPATIENT)
Dept: RADIOLOGY | Facility: HOSPITAL | Age: 64
DRG: 100 | End: 2024-11-11
Payer: MEDICARE

## 2024-11-11 ENCOUNTER — APPOINTMENT (OUTPATIENT)
Dept: CARDIOLOGY | Facility: HOSPITAL | Age: 64
DRG: 100 | End: 2024-11-11
Payer: MEDICARE

## 2024-11-11 VITALS
RESPIRATION RATE: 12 BRPM | OXYGEN SATURATION: 96 % | DIASTOLIC BLOOD PRESSURE: 97 MMHG | SYSTOLIC BLOOD PRESSURE: 159 MMHG | TEMPERATURE: 97.5 F | HEART RATE: 55 BPM

## 2024-11-11 LAB
ALBUMIN SERPL BCP-MCNC: 4.1 G/DL (ref 3.4–5)
ANION GAP SERPL CALC-SCNC: 16 MMOL/L (ref 10–20)
BASOPHILS # BLD AUTO: 0.04 X10*3/UL (ref 0–0.1)
BASOPHILS NFR BLD AUTO: 0.5 %
BUN SERPL-MCNC: 19 MG/DL (ref 6–23)
CALCIUM SERPL-MCNC: 8.7 MG/DL (ref 8.6–10.6)
CHLORIDE SERPL-SCNC: 105 MMOL/L (ref 98–107)
CO2 SERPL-SCNC: 22 MMOL/L (ref 21–32)
CREAT SERPL-MCNC: 0.91 MG/DL (ref 0.5–1.3)
EGFRCR SERPLBLD CKD-EPI 2021: >90 ML/MIN/1.73M*2
EOSINOPHIL # BLD AUTO: 0 X10*3/UL (ref 0–0.7)
EOSINOPHIL NFR BLD AUTO: 0 %
ERYTHROCYTE [DISTWIDTH] IN BLOOD BY AUTOMATED COUNT: 12.3 % (ref 11.5–14.5)
GLUCOSE SERPL-MCNC: 70 MG/DL (ref 74–99)
HCT VFR BLD AUTO: 44.1 % (ref 41–52)
HGB BLD-MCNC: 15.3 G/DL (ref 13.5–17.5)
IMM GRANULOCYTES # BLD AUTO: 0.04 X10*3/UL (ref 0–0.7)
IMM GRANULOCYTES NFR BLD AUTO: 0.5 % (ref 0–0.9)
LYMPHOCYTES # BLD AUTO: 1.19 X10*3/UL (ref 1.2–4.8)
LYMPHOCYTES NFR BLD AUTO: 15.7 %
MAGNESIUM SERPL-MCNC: 2.07 MG/DL (ref 1.6–2.4)
MCH RBC QN AUTO: 31.5 PG (ref 26–34)
MCHC RBC AUTO-ENTMCNC: 34.7 G/DL (ref 32–36)
MCV RBC AUTO: 91 FL (ref 80–100)
MONOCYTES # BLD AUTO: 0.67 X10*3/UL (ref 0.1–1)
MONOCYTES NFR BLD AUTO: 8.8 %
NEUTROPHILS # BLD AUTO: 5.65 X10*3/UL (ref 1.2–7.7)
NEUTROPHILS NFR BLD AUTO: 74.5 %
NRBC BLD-RTO: 0 /100 WBCS (ref 0–0)
PHOSPHATE SERPL-MCNC: 2.3 MG/DL (ref 2.5–4.9)
PLATELET # BLD AUTO: 213 X10*3/UL (ref 150–450)
POTASSIUM SERPL-SCNC: 3.4 MMOL/L (ref 3.5–5.3)
RBC # BLD AUTO: 4.85 X10*6/UL (ref 4.5–5.9)
SODIUM SERPL-SCNC: 140 MMOL/L (ref 136–145)
WBC # BLD AUTO: 7.6 X10*3/UL (ref 4.4–11.3)

## 2024-11-11 PROCEDURE — 2500000001 HC RX 250 WO HCPCS SELF ADMINISTERED DRUGS (ALT 637 FOR MEDICARE OP)

## 2024-11-11 PROCEDURE — 85025 COMPLETE CBC W/AUTO DIFF WBC: CPT

## 2024-11-11 PROCEDURE — 93285 PRGRMG DEV EVAL SCRMS IP: CPT

## 2024-11-11 PROCEDURE — 93285 PRGRMG DEV EVAL SCRMS IP: CPT | Performed by: INTERNAL MEDICINE

## 2024-11-11 PROCEDURE — 2500000004 HC RX 250 GENERAL PHARMACY W/ HCPCS (ALT 636 FOR OP/ED)

## 2024-11-11 PROCEDURE — 95720 EEG PHY/QHP EA INCR W/VEEG: CPT | Performed by: PSYCHIATRY & NEUROLOGY

## 2024-11-11 PROCEDURE — 2500000005 HC RX 250 GENERAL PHARMACY W/O HCPCS

## 2024-11-11 PROCEDURE — 99232 SBSQ HOSP IP/OBS MODERATE 35: CPT

## 2024-11-11 PROCEDURE — 84100 ASSAY OF PHOSPHORUS: CPT

## 2024-11-11 PROCEDURE — 83735 ASSAY OF MAGNESIUM: CPT

## 2024-11-11 PROCEDURE — 36415 COLL VENOUS BLD VENIPUNCTURE: CPT

## 2024-11-11 PROCEDURE — 97162 PT EVAL MOD COMPLEX 30 MIN: CPT | Mod: GP | Performed by: PHYSICAL THERAPIST

## 2024-11-11 PROCEDURE — 95715 VEEG EA 12-26HR INTMT MNTR: CPT

## 2024-11-11 PROCEDURE — 1200000002 HC GENERAL ROOM WITH TELEMETRY DAILY

## 2024-11-11 RX ORDER — LISINOPRIL 20 MG/1
20 TABLET ORAL DAILY
Status: DISCONTINUED | OUTPATIENT
Start: 2024-11-11 | End: 2024-11-11

## 2024-11-11 RX ORDER — LACOSAMIDE 100 MG/1
100 TABLET ORAL EVERY 12 HOURS SCHEDULED
Status: DISCONTINUED | OUTPATIENT
Start: 2024-11-11 | End: 2024-11-14 | Stop reason: HOSPADM

## 2024-11-11 RX ORDER — LISINOPRIL 20 MG/1
20 TABLET ORAL DAILY
Status: DISCONTINUED | OUTPATIENT
Start: 2024-11-12 | End: 2024-11-14

## 2024-11-11 RX ORDER — LORAZEPAM 2 MG/ML
2 INJECTION INTRAMUSCULAR ONCE AS NEEDED
Status: DISCONTINUED | OUTPATIENT
Start: 2024-11-11 | End: 2024-11-12

## 2024-11-11 RX ORDER — LEVETIRACETAM 750 MG/1
1500 TABLET ORAL 2 TIMES DAILY
Status: DISCONTINUED | OUTPATIENT
Start: 2024-11-11 | End: 2024-11-14 | Stop reason: HOSPADM

## 2024-11-11 RX ORDER — POTASSIUM CHLORIDE 20 MEQ/1
40 TABLET, EXTENDED RELEASE ORAL ONCE
Status: DISCONTINUED | OUTPATIENT
Start: 2024-11-11 | End: 2024-11-11

## 2024-11-11 ASSESSMENT — PAIN SCALES - GENERAL
PAINLEVEL_OUTOF10: 0 - NO PAIN
PAINLEVEL_OUTOF10: 0 - NO PAIN

## 2024-11-11 ASSESSMENT — COGNITIVE AND FUNCTIONAL STATUS - GENERAL
MOBILITY SCORE: 11
TURNING FROM BACK TO SIDE WHILE IN FLAT BAD: A LOT
MOVING FROM LYING ON BACK TO SITTING ON SIDE OF FLAT BED WITH BEDRAILS: A LITTLE
MOVING TO AND FROM BED TO CHAIR: A LOT
STANDING UP FROM CHAIR USING ARMS: A LOT
CLIMB 3 TO 5 STEPS WITH RAILING: TOTAL
WALKING IN HOSPITAL ROOM: TOTAL

## 2024-11-11 ASSESSMENT — PAIN - FUNCTIONAL ASSESSMENT
PAIN_FUNCTIONAL_ASSESSMENT: 0-10

## 2024-11-11 ASSESSMENT — ACTIVITIES OF DAILY LIVING (ADL): ADL_ASSISTANCE: INDEPENDENT

## 2024-11-11 NOTE — PROGRESS NOTES
Josiah James is a 63 y.o. male on day 3 of admission presenting with New onset seizure (Multi).      Subjective   No acute overnight events. Patient was visited this morning at bedside and was noted to have hiccups. Hiccups self-resolved in the afternoon; patient was given a straw and was counseled on valsalva techniques.     Otherwise patient in no acute distress.     Objective     Last Recorded Vitals  Blood pressure (!) 155/94, pulse 102, temperature 37.2 °C (99 °F), temperature source Temporal, resp. rate 20, SpO2 95%.    Physical Exam  Constitutional:       General: He is not in acute distress.  HENT:      Mouth/Throat:      Mouth: Mucous membranes are moist.      Pharynx: Oropharynx is clear.   Eyes:      General: Lids are normal.      Extraocular Movements: Extraocular movements intact.      Conjunctiva/sclera: Conjunctivae normal.      Comments: Pupils equal and round   Cardiovascular:      Rate and Rhythm: Normal rate and regular rhythm.   Pulmonary:      Effort: Pulmonary effort is normal.      Comments: Crackles appreciated bilaterally  Skin:     General: Skin is warm and dry.   Neurological:      Mental Status: He is alert.      Cranial Nerves: Cranial nerves 2-12 are intact.      Motor: Weakness: RLE 4/5; distal LLE 3/5.   Psychiatric:         Speech: Speech normal.       Neurological Exam  Mental Status  Alert. Oriented only to person, place and time. Speech is normal.    Cranial Nerves  CN II: Vision test: Pupils equal and round.  CN III, IV, VI: Extraocular movements intact bilaterally. Normal lids and orbits bilaterally. Pupils equal and round.  CN V:  Right: Facial sensation is normal.  Left: Facial sensation is normal on the left.  CN VII:  Right: There is no facial weakness.  Left: There is no facial weakness.  CN VIII: Hearing intact to conversation.  CN IX, X: Palate elevates symmetrically  CN XI:  Right: Sternocleidomastoid strength is normal.  Left: Sternocleidomastoid strength is normal.  CN  XII: Tongue midline without atrophy or fasciculations.    Motor  Normal muscle bulk throughout. No fasciculations present. Normal muscle tone. No abnormal involuntary movements.  Strength:   --> RUE 5/5, RLE 4+/5  --> LUE 5/5, LLE 4/5.    Sensory Extinction to double simultaneous stimulation of the left leg.  Sensory deficit LLE - extinction to light touch .    Scheduled medications  amLODIPine, 10 mg, oral, Daily  aspirin, 81 mg, oral, Daily  enoxaparin, 40 mg, subcutaneous, q24h  lacosamide, 100 mg, oral, q12h [place]  levETIRAcetam, 1,500 mg, oral, BID  lisinopril, 20 mg, oral, Daily  polyethylene glycol, 17 g, oral, Daily  thiamine, 100 mg, oral, Daily      Continuous medications       PRN medications  PRN medications: acetaminophen **OR** acetaminophen, hydrALAZINE, labetaloL, LORazepam, LORazepam, ondansetron    Results for orders placed or performed during the hospital encounter of 11/08/24 (from the past 24 hours)   Renal Function Panel   Result Value Ref Range    Glucose 70 (L) 74 - 99 mg/dL    Sodium 140 136 - 145 mmol/L    Potassium 3.4 (L) 3.5 - 5.3 mmol/L    Chloride 105 98 - 107 mmol/L    Bicarbonate 22 21 - 32 mmol/L    Anion Gap 16 10 - 20 mmol/L    Urea Nitrogen 19 6 - 23 mg/dL    Creatinine 0.91 0.50 - 1.30 mg/dL    eGFR >90 >60 mL/min/1.73m*2    Calcium 8.7 8.6 - 10.6 mg/dL    Phosphorus 2.3 (L) 2.5 - 4.9 mg/dL    Albumin 4.1 3.4 - 5.0 g/dL   Magnesium   Result Value Ref Range    Magnesium 2.07 1.60 - 2.40 mg/dL   CBC and Auto Differential   Result Value Ref Range    WBC 7.6 4.4 - 11.3 x10*3/uL    nRBC 0.0 0.0 - 0.0 /100 WBCs    RBC 4.85 4.50 - 5.90 x10*6/uL    Hemoglobin 15.3 13.5 - 17.5 g/dL    Hematocrit 44.1 41.0 - 52.0 %    MCV 91 80 - 100 fL    MCH 31.5 26.0 - 34.0 pg    MCHC 34.7 32.0 - 36.0 g/dL    RDW 12.3 11.5 - 14.5 %    Platelets 213 150 - 450 x10*3/uL    Neutrophils % 74.5 40.0 - 80.0 %    Immature Granulocytes %, Automated 0.5 0.0 - 0.9 %    Lymphocytes % 15.7 13.0 - 44.0 %    Monocytes  % 8.8 2.0 - 10.0 %    Eosinophils % 0.0 0.0 - 6.0 %    Basophils % 0.5 0.0 - 2.0 %    Neutrophils Absolute 5.65 1.20 - 7.70 x10*3/uL    Immature Granulocytes Absolute, Automated 0.04 0.00 - 0.70 x10*3/uL    Lymphocytes Absolute 1.19 (L) 1.20 - 4.80 x10*3/uL    Monocytes Absolute 0.67 0.10 - 1.00 x10*3/uL    Eosinophils Absolute 0.00 0.00 - 0.70 x10*3/uL    Basophils Absolute 0.04 0.00 - 0.10 x10*3/uL     cvEEG 11/8: This EEG shows left frontal Status epilepticus and is indicative of severe diffuse encephalopathy.   -----------    cvEEG 11/9-11/10:  This cEEG shows frequent right foot tonic seizures with EEG seizure pattern over the left frontal lobe and is indicative a a severe diffuse encephalopathy. Seizures decrease in frequency through the course of the record to around 5 every 6 hours. The last seizure captured was at 4:43 AM on 11/10/24.    Assessment/Plan   Josiah James is a 63 y.o. male with PMH of hypertension, osteoarthritis, prior R parietal and left occipital strokes in 2022 etiology unknown, presented initially with encephalopathy, had 2 episodes of generalized tonic clonic in the ED. He was confused earlier in the day, daughter called EMS. Was found to have expressive aphasia in the ED. BAT activated but no evidence of stroke. He had 2 episodes of seizure activity after the BAT. S/p 4 mg ativan and 4.5 g keppra. Encephalopathic on exam but his left side is weaker which fits with his baseline. His presentation is concerning for seizures given his old strokes. Routine EEG showed seizure activity; MRI brain pending. Vimpat was added and keppra was increased. Mental status improving since admission.    Updates 11/11/24  - Was noted to have hiccups in the AM; incentive spirometry was ordered & patient was instructed on valsalva maneuvers.  - Consulted respiratory for bilateral crackles appreciated on chest exam; appreciate recs  - Increased lisinopril from 10 to 20mg  - Transitioned keppra & vimpat from IV  to oral     # Seizures   # Encephalopathy  4D Classification of the Paroxysmal Episodes:     Epileptic  Episodes semiology: Version -> GTC  Frequency: Twice day of admission  History of Status Epilepticus: No  Localization: Unknown  Etiology: Likely prior R parietal and left occipital infarcts   Co-morbidities: HTN, HLD     :: S/p 4 mg ativan + 4.5 g Keppra  :: Mental status improving with seizure control  - CTH and CTP showing no acute process  - cvEEG  - Keppra 1500 mg BID  - Vimpat 100 mg BID   - MRI brain w/o contrast when patient can tolerate  --> Patient has loop recorder - consulted device team to read device prior to MRI planned for 11/12     # HTN  - SBP < 180   - IV hydralazine and labetalol pushes PRN  - Continue home meds amlodipine 10 mg daily and lisinopril 20 mg daily  - Cardene drip if required multiple PRNs  - Cont. ASA 81 mg daily     # Leukocytosis (resolved)  ::WBC 13.3 on 11/9 from 6; 7.6 as of 11/11  - Afebrile  - No infectious process seen on CXR  - BCX and UA with reflex ordered  - F/U repeat CBC  - Low threshold to start empiric antibiotics if febrile    F: N/A  E: Replete PRN  N: NPO due to mentation     Last BM:       GIppx: NA     DVTppx: Lovenox     Code Status: Full Code   Emergency contact: DYLLAN KELLEY (Daughter)  [phone]      Patient discussed and seen by attending physician Dr. Erwin Ford, MS-3    Bhavya Turner MD  Neurology, PGY-1

## 2024-11-11 NOTE — PROGRESS NOTES
Physical Therapy    Physical Therapy Evaluation    Patient Name: Josiah James  MRN: [id number]  Today's Date: 11/11/2024   Time Calculation  Start Time: 1140  Stop Time: 1203  Time Calculation (min): 23 min  4027/4027-A    Assessment/Plan   PT Assessment  PT Assessment Results: Decreased strength, Decreased range of motion, Decreased endurance, Impaired balance, Decreased mobility, Decreased cognition, Impaired judgement  Rehab Prognosis: Good  Barriers to Discharge: medical acuity, labile BP  Evaluation/Treatment Tolerance: Patient limited by fatigue  Medical Staff Made Aware: Yes  End of Session Communication: Bedside nurse  Assessment Comment: Pt. is a 62 y/o M admitted with new seizure activity. Pt. presents pleasantly confused with weakness, impaired balance, impaired motor control, decreased endurance, and difficulty with funcitonal mobility compared to baseline. Pt. would benefit from skilled PT while IP to address these deficits.  End of Session Patient Position: Bed, 3 rail up, Alarm off, not on at start of session  IP OR SWING BED PT PLAN  Inpatient or Swing Bed: Inpatient  PT Plan  Treatment/Interventions: Bed mobility, Transfer training, Gait training, Stair training, Balance training, Neuromuscular re-education, Strengthening, Endurance training, Therapeutic exercise, Therapeutic activity, Home exercise program  PT Plan: Ongoing PT  PT Frequency: 3 times per week  PT Discharge Recommendations: Moderate intensity level of continued care  PT Recommended Transfer Status: Assist x2  PT - OK to Discharge: Yes (PT evaluation complete and rehav recommendations made)    Subjective     General Visit Information:  General  Reason for Referral: presented initially with encephalopathy, had 2 episodes of generalized tonic clonic in the ED. CT head: No acute intracranial abnormality or mass effec  Past Medical History Relevant to Rehab: PMH of hypertension, osteoarthritis, prior R parietal and left occipital strokes in  "2022 etiology unknown,  Missed Visit: Yes  Missed Visit Reason:  (Pt. pleasant and agreeable to tx. Resting /123. RN notified. Will hold and reattempt  as appropriate.)  Family/Caregiver Present: No  Prior to Session Communication: Bedside nurse  General Comment: Returned to see pt, BP now within therapeutic range. Pt. presents with intermittent confusion, saturated in urine upon arrival. Required heavy A for all functional mobility.    Home Living:  Home Living  Type of Home: House  Lives With:  (with daughter and 2 grandchildren)  Home Adaptive Equipment: Walker rolling or standard  Home Layout: One level  Home Access: Stairs to enter with rails  Entrance Stairs-Number of Steps: 2  Home Living Comments: pt. questionable historian due to AMS.    Prior Level of Function:  Prior Function Per Pt/Caregiver Report  Level of [place]: Independent with ADLs and functional transfers, Needs assistance with homemaking  Receives Help From: Family  ADL Assistance: Independent  Homemaking Assistance: Needs assistance  Ambulatory Assistance: Independent (with RW)  Vocational: Retired (Pt. reports working in [employer])  Leisure: enjoys baseball/ [employer]  Prior Function Comments: -driving, no report of falls.    Precautions:  Precautions  Medical Precautions: Fall precautions, Seizure precautions (VEEG)  Precautions Comment: SBP <180    Vital Signs:  Vital Signs  Vitals Session: During PT  Heart Rate: 106  Heart Rate Source: Monitor  Resp: 20  SpO2: 96 %  BP: (!) 144/98 (s/p session 155/94)  BP Location: Right arm  BP Method: Automatic  Objective     Pain:  Pain Assessment  Pain Assessment: 0-10  0-10 (Numeric) Pain Score: 0 - No pain    Cognition:  Cognition  Overall Cognitive Status: Impaired (50-75% command follow with repetition)  Orientation Level: Disoriented to time, Disoriented to situation (able to state name, birthdate, and hospital. Thought year was \"1999\". Unclear of why admitted to " [facility])  Safety/Judgement: Exceptions to WFL  Insight: Moderate  Impulsive: Mildly  Task Initiation: Delayed initiation    General Assessments:      Activity Tolerance  Endurance: Decreased tolerance for upright activites (heavy anterior flexion)  Sensation  Light Touch:  (unable to formally assess)        Coordination  Movements are Fluid and Coordinated: No  Postural Control  Postural Control: Impaired (heavy trunk flexion, forward head posture)  Static Sitting Balance  Static Sitting-Balance Support: Feet supported  Static Sitting-Level of Assistance: Contact guard  Dynamic Sitting Balance  Dynamic Sitting-Balance Support: Bilateral upper extremity supported  Dynamic Sitting-Level of Assistance: Minimum assistance  Static Standing Balance  Static Standing-Balance Support: Bilateral upper extremity supported (with RW)  Static Standing-Level of Assistance: Moderate assistance  Dynamic Standing Balance  Dynamic Standing-Balance Support: Bilateral upper extremity supported (with RW)  Dynamic Standing-Level of Assistance: Maximum assistance (x2)    Functional Assessments:     Bed Mobility  Bed Mobility: Yes  Bed Mobility 1  Bed Mobility 1: Supine to sitting  Level of Assistance 1: Moderate assistance  Bed Mobility Comments 1: HOB elevated, cues for safety/sequencing  Bed Mobility 2  Bed Mobility  2: Sitting to supine  Level of Assistance 2: Maximum assistance  Bed Mobility Comments 2: increased A secondary to fatigue s/p sitting EOB  Bed Mobility 3  Bed Mobility 3: Scooting  Level of Assistance 3: +2, Dependent  Bed Mobility Comments 3: boost with draw sheet to reposition at end of session  Transfers  Transfer: Yes  Transfer 1  Technique 1: Stand to sit, Sit to stand  Transfer Device 1: Walker  Transfer Level of Assistance 1: Maximum assistance, Moderate verbal cues, +2  Trials/Comments 1: heavy A at hips secondary to retro lean on bed, +flexed posture, limited standing tolerance.  Ambulation/Gait  Training  Ambulation/Gait Training Performed: No (attempted side steps at EOB, pt. unable to advance LE's. Therefore returned to sitting.)  Stairs  Stairs: No       Extremity/Trunk Assessments:        RLE   RLE : Exceptions to WFL  AROM RLE (degrees)  RLE AROM Comment: +knee flexion contracture, tends to position LE in abd/ER  Strength RLE  RLE Overall Strength:  (Grossly 4/5 throughout. Difficult to formally assess secondary to poor command follow.)  LLE   LLE : Exceptions to WFL  AROM LLE (degrees)  LLE AROM Comment: +knee flexion contracture, tends to position LE in abd/ER  Strength LLE  LLE Overall Strength:  (Grossly 3/5 throughout as observed via functional mobility)    Outcome Measures:     [facility] Basic Mobility  Turning from your back to your side while in a flat bed without using bedrails: A little  Moving from lying on your back to sitting on the side of a flat bed without using bedrails: A lot  Moving to and from bed to chair (including a wheelchair): A lot  Standing up from a chair using your arms (e.g. wheelchair or bedside chair): A lot  To walk in hospital room: Total  Climbing 3-5 steps with railing: Total  Basic Mobility - Total Score: 11                                                             Goals:  Encounter Problems       Encounter Problems (Active)       PT Problem       Pt. will perform bed mobility with min A        Start:  11/11/24    Expected End:  11/25/24            Pt. will perform transfers with RW with min A        Start:  11/11/24    Expected End:  11/25/24            Pt. will ambulate > 25 ft. with min A and RW for short household ambulation        Start:  11/11/24    Expected End:  11/25/24            Pt. will require CGA for static/dynamic standing balance with RW to safely participate in ADLs        Start:  11/11/24    Expected End:  11/25/24               Pain - Adult            Education Documentation  Precautions, taught by Alpa Green, PT at 11/11/2024 12:49  PM.  Learner: Patient  Readiness: Acceptance  Method: Explanation  Response: Verbalizes Understanding, Needs Reinforcement    Body Mechanics, taught by Alpa Green, PT at 11/11/2024 12:49 PM.  Learner: Patient  Readiness: Acceptance  Method: Explanation  Response: Verbalizes Understanding, Needs Reinforcement    Mobility Training, taught by Alpa Green, PT at 11/11/2024 12:49 PM.  Learner: Patient  Readiness: Acceptance  Method: Explanation  Response: Verbalizes Understanding, Needs Reinforcement    Education Comments  No comments found.

## 2024-11-11 NOTE — CARE PLAN
The patient's goals for the shift include      The clinical goals for the shift include Patient will not have any signs of seizure activity throughout shift.      Problem: Skin  Goal: Decreased wound size/increased tissue granulation at next dressing change  Outcome: Progressing  Flowsheets (Taken 11/9/2024 2201 by Liliya Conklin RN)  Decreased wound size/increased tissue granulation at next dressing change: Promote sleep for wound healing  Goal: Participates in plan/prevention/treatment measures  Outcome: Progressing  Flowsheets (Taken 11/9/2024 2201 by Liliya Conklin RN)  Participates in plan/prevention/treatment measures: Elevate heels  Goal: Prevent/manage excess moisture  Outcome: Progressing  Flowsheets (Taken 11/9/2024 2201 by Liliya Conklin RN)  Prevent/manage excess moisture: Moisturize dry skin  Goal: Prevent/minimize sheer/friction injuries  Outcome: Progressing  Flowsheets (Taken 11/9/2024 2201 by Liliya Conklin RN)  Prevent/minimize sheer/friction injuries: HOB 30 degrees or less  Goal: Promote/optimize nutrition  Outcome: Progressing  Flowsheets (Taken 11/9/2024 2201 by Liliya Conklin RN)  Promote/optimize nutrition: Monitor/record intake including meals  Goal: Promote skin healing  Outcome: Progressing  Flowsheets (Taken 11/9/2024 2201 by Liliya Conklin RN)  Promote skin healing: Assess skin/pad under line(s)/device(s)     Problem: Discharge Planning  Goal: Discharge to home or other facility with appropriate resources  Outcome: Progressing     Problem: Safety - Adult  Goal: Free from fall injury  Outcome: Progressing     Problem: Pain - Adult  Goal: Verbalizes/displays adequate comfort level or baseline comfort level  Outcome: Progressing     Problem: Chronic Conditions and Co-morbidities  Goal: Patient's chronic conditions and co-morbidity symptoms are monitored and maintained or improved  Outcome: Progressing

## 2024-11-11 NOTE — PROGRESS NOTES
Physical Therapy Attempt                 Therapy Communication Note    Patient Name: Josiah James  MRN: [id number]  Department: [place] [id number]  Room: [address] [place], [place] [address]A  Today's Date: 11/11/2024     Discipline: Physical Therapy    Missed Visit Reason: Missed Visit Reason:  (Pt. pleasant and agreeable to tx. Resting /123. RN notified. Will hold and reattempt  as appropriate.)    Missed Time: Attempt

## 2024-11-11 NOTE — HOSPITAL COURSE
Josiah James is a 63 y.o. male with PMH of hypertension, osteoarthritis, prior R parietal and left occipital strokes in 2022 (unknown etiology), presented initially with encephalopathy, had 2 episodes of generalized tonic clonic in the ED. Epilepsy consulted for seizure management.      Per daughter, he woke up fine in the morning and spoke to his fiance. Later in the morning his daughter heard him yelling and cursing from his room. He called out to his fiance's son who doesn't live with them. He didn't recognize the daughter initially. He was not oriented to month and staring around confused. She called EMS.      In the ED he was thought to have expressive aphasia. BAT was called at 12:51. CT head showed old infarcts in the R MCA and left PCA territory. CT perfusion was unremarkable. Afterwards ED staff noticed 2 generalized tonic events. The first one self aborted after 2 minutes. The second lasted longer and he received 4 mg ativan with 4.5 g keppra load.      His daughter and chart review mention brief episodes of left arm shaking in Oct 2023. Workup was negative for seizures and it was thought to be due to Wellbutrin initiation.      In the ED:   -202/105-121, HR: 82, 95% on RA  Lactate 1.8, WBC 6, Hgb 16.4, Na 140    Since his admission EEG has demonstrated L frontal epileptic activity with decreased seizure frequency throughout his course managed on Keppra 1500mg BID and Vimpat 100mg BID. cvEEG was discontinued on 11/12 due to improvement of seizure frequency.     MRI brain wo contrast was performed on 11/12. This demonstrated signal abnormality throughout the left hippocampus that is likely a reflection of his seizure activity. Given his imaging results, it is likely his seizures are originating from his prior L occipital stroke. A punctate focus of restricted diffusion within the right cerebellum was also discovered, which could be consistent with an acute or subacute infarct. He is on aspirin and  atorvastatin.      Also while in the hospital the patient developed a cough and bilateral crackles on chest exam. His cough and bilateral crackles have both improved on exam. He has remained afebrile & blood cultures are negative. It is possible that his lung sounds could correlate with his hiccups, causes including aspiration pneumonitis or GERD. However, at present, he is afebrile without leukocytosis. He did undergo CXR which revealed no acute cardiopulmonary processes. He does demonstrates bilateral expiratory wheezing as well and has a history of prior tobacco use. Given this, will recommend follow up with pulmonology for PFT testing to assess for COPD.     He continued to have hiccups for several days. Valsalva maneuvers were attempted without cessation of hiccups. PPI was started with mild improvement. Dose of gabapentin 100mg was given night of 11/13 with cessation of hiccups. Patient will leave with gabapentin 100mg PRN for hiccups if they continue.     His blood pressure was also labile during his course. His lisinopril was increased from 10mg to 20mg and his amlodipine was continued at 10mg. Recommend following up with PCP after hospital discharge.    On 11/14 the patient continues to demonstrate improvement and is doing well on Keppra and Vimpat. He is medically ready for discharge.

## 2024-11-11 NOTE — PROGRESS NOTES
Occupational Therapy    Communication Note    Patient Name: Josiah James  MRN: [id number]  Today's Date: 11/11/2024   Room: [address] [place], [place] [address]    Discipline: Occupational Therapy      Missed Visit: Yes  Missed Visit Reason: Other (Comment) (Pt with high BP, will hold OT eval at this time)      11/11/24 at 9:30 AM   Julissa Pereira OT   Rehab Office: [phone]

## 2024-11-12 ENCOUNTER — APPOINTMENT (OUTPATIENT)
Dept: RADIOLOGY | Facility: HOSPITAL | Age: 64
DRG: 100 | End: 2024-11-12
Payer: MEDICARE

## 2024-11-12 LAB
ALBUMIN SERPL BCP-MCNC: 4.1 G/DL (ref 3.4–5)
ANION GAP SERPL CALC-SCNC: 15 MMOL/L (ref 10–20)
BASOPHILS # BLD AUTO: 0.05 X10*3/UL (ref 0–0.1)
BASOPHILS NFR BLD AUTO: 0.8 %
BUN SERPL-MCNC: 19 MG/DL (ref 6–23)
CALCIUM SERPL-MCNC: 8.8 MG/DL (ref 8.6–10.6)
CHLORIDE SERPL-SCNC: 103 MMOL/L (ref 98–107)
CO2 SERPL-SCNC: 26 MMOL/L (ref 21–32)
CREAT SERPL-MCNC: 0.93 MG/DL (ref 0.5–1.3)
EGFRCR SERPLBLD CKD-EPI 2021: >90 ML/MIN/1.73M*2
EOSINOPHIL # BLD AUTO: 0.02 X10*3/UL (ref 0–0.7)
EOSINOPHIL NFR BLD AUTO: 0.3 %
ERYTHROCYTE [DISTWIDTH] IN BLOOD BY AUTOMATED COUNT: 12.5 % (ref 11.5–14.5)
GLUCOSE BLD MANUAL STRIP-MCNC: 70 MG/DL (ref 74–99)
GLUCOSE SERPL-MCNC: 61 MG/DL (ref 74–99)
HCT VFR BLD AUTO: 47 % (ref 41–52)
HGB BLD-MCNC: 15.9 G/DL (ref 13.5–17.5)
IMM GRANULOCYTES # BLD AUTO: 0.02 X10*3/UL (ref 0–0.7)
IMM GRANULOCYTES NFR BLD AUTO: 0.3 % (ref 0–0.9)
LYMPHOCYTES # BLD AUTO: 1.15 X10*3/UL (ref 1.2–4.8)
LYMPHOCYTES NFR BLD AUTO: 17.7 %
MAGNESIUM SERPL-MCNC: 2.23 MG/DL (ref 1.6–2.4)
MCH RBC QN AUTO: 31.2 PG (ref 26–34)
MCHC RBC AUTO-ENTMCNC: 33.8 G/DL (ref 32–36)
MCV RBC AUTO: 92 FL (ref 80–100)
MONOCYTES # BLD AUTO: 0.48 X10*3/UL (ref 0.1–1)
MONOCYTES NFR BLD AUTO: 7.4 %
NEUTROPHILS # BLD AUTO: 4.77 X10*3/UL (ref 1.2–7.7)
NEUTROPHILS NFR BLD AUTO: 73.5 %
NRBC BLD-RTO: 0 /100 WBCS (ref 0–0)
PHOSPHATE SERPL-MCNC: 2.4 MG/DL (ref 2.5–4.9)
PLATELET # BLD AUTO: 235 X10*3/UL (ref 150–450)
POTASSIUM SERPL-SCNC: 3.4 MMOL/L (ref 3.5–5.3)
RBC # BLD AUTO: 5.1 X10*6/UL (ref 4.5–5.9)
SODIUM SERPL-SCNC: 141 MMOL/L (ref 136–145)
WBC # BLD AUTO: 6.5 X10*3/UL (ref 4.4–11.3)

## 2024-11-12 PROCEDURE — 36415 COLL VENOUS BLD VENIPUNCTURE: CPT

## 2024-11-12 PROCEDURE — 2500000001 HC RX 250 WO HCPCS SELF ADMINISTERED DRUGS (ALT 637 FOR MEDICARE OP)

## 2024-11-12 PROCEDURE — 70551 MRI BRAIN STEM W/O DYE: CPT

## 2024-11-12 PROCEDURE — 85025 COMPLETE CBC W/AUTO DIFF WBC: CPT

## 2024-11-12 PROCEDURE — 82947 ASSAY GLUCOSE BLOOD QUANT: CPT

## 2024-11-12 PROCEDURE — 1100000001 HC PRIVATE ROOM DAILY

## 2024-11-12 PROCEDURE — 97166 OT EVAL MOD COMPLEX 45 MIN: CPT | Mod: GO

## 2024-11-12 PROCEDURE — 70551 MRI BRAIN STEM W/O DYE: CPT | Performed by: RADIOLOGY

## 2024-11-12 PROCEDURE — 99232 SBSQ HOSP IP/OBS MODERATE 35: CPT

## 2024-11-12 PROCEDURE — 2500000004 HC RX 250 GENERAL PHARMACY W/ HCPCS (ALT 636 FOR OP/ED)

## 2024-11-12 PROCEDURE — 2500000005 HC RX 250 GENERAL PHARMACY W/O HCPCS

## 2024-11-12 PROCEDURE — 84100 ASSAY OF PHOSPHORUS: CPT

## 2024-11-12 PROCEDURE — 71046 X-RAY EXAM CHEST 2 VIEWS: CPT | Performed by: RADIOLOGY

## 2024-11-12 PROCEDURE — 95720 EEG PHY/QHP EA INCR W/VEEG: CPT | Performed by: PSYCHIATRY & NEUROLOGY

## 2024-11-12 PROCEDURE — 97116 GAIT TRAINING THERAPY: CPT | Mod: GP

## 2024-11-12 PROCEDURE — 71046 X-RAY EXAM CHEST 2 VIEWS: CPT

## 2024-11-12 PROCEDURE — 95715 VEEG EA 12-26HR INTMT MNTR: CPT

## 2024-11-12 PROCEDURE — 83735 ASSAY OF MAGNESIUM: CPT

## 2024-11-12 PROCEDURE — 97530 THERAPEUTIC ACTIVITIES: CPT | Mod: GP

## 2024-11-12 RX ORDER — DEXTROSE 50 % IN WATER (D50W) INTRAVENOUS SYRINGE
12.5
Status: DISCONTINUED | OUTPATIENT
Start: 2024-11-12 | End: 2024-11-14 | Stop reason: HOSPADM

## 2024-11-12 RX ORDER — LORAZEPAM 2 MG/ML
1 INJECTION INTRAMUSCULAR ONCE AS NEEDED
Status: COMPLETED | OUTPATIENT
Start: 2024-11-12 | End: 2024-11-12

## 2024-11-12 RX ORDER — DEXTROSE 50 % IN WATER (D50W) INTRAVENOUS SYRINGE
25
Status: DISCONTINUED | OUTPATIENT
Start: 2024-11-12 | End: 2024-11-14 | Stop reason: HOSPADM

## 2024-11-12 RX ORDER — ATORVASTATIN CALCIUM 40 MG/1
40 TABLET, FILM COATED ORAL NIGHTLY
Status: DISCONTINUED | OUTPATIENT
Start: 2024-11-12 | End: 2024-11-14 | Stop reason: HOSPADM

## 2024-11-12 RX ORDER — PANTOPRAZOLE SODIUM 40 MG/1
40 TABLET, DELAYED RELEASE ORAL
Status: DISCONTINUED | OUTPATIENT
Start: 2024-11-12 | End: 2024-11-14 | Stop reason: HOSPADM

## 2024-11-12 ASSESSMENT — COGNITIVE AND FUNCTIONAL STATUS - GENERAL
EATING MEALS: A LITTLE
MOBILITY SCORE: 8
TOILETING: A LITTLE
MOVING FROM LYING ON BACK TO SITTING ON SIDE OF FLAT BED WITH BEDRAILS: A LOT
DRESSING REGULAR LOWER BODY CLOTHING: TOTAL
DAILY ACTIVITIY SCORE: 11
HELP NEEDED FOR BATHING: A LITTLE
CLIMB 3 TO 5 STEPS WITH RAILING: A LOT
HELP NEEDED FOR BATHING: A LOT
MOBILITY SCORE: 14
MOVING TO AND FROM BED TO CHAIR: A LOT
DRESSING REGULAR UPPER BODY CLOTHING: A LOT
MOVING FROM LYING ON BACK TO SITTING ON SIDE OF FLAT BED WITH BEDRAILS: A LITTLE
DRESSING REGULAR LOWER BODY CLOTHING: A LITTLE
TOILETING: TOTAL
EATING MEALS: A LITTLE
CLIMB 3 TO 5 STEPS WITH RAILING: TOTAL
PERSONAL GROOMING: A LITTLE
TURNING FROM BACK TO SIDE WHILE IN FLAT BAD: A LITTLE
STANDING UP FROM CHAIR USING ARMS: TOTAL
DRESSING REGULAR UPPER BODY CLOTHING: A LITTLE
TURNING FROM BACK TO SIDE WHILE IN FLAT BAD: A LOT
DAILY ACTIVITIY SCORE: 18
PERSONAL GROOMING: A LOT
WALKING IN HOSPITAL ROOM: TOTAL
WALKING IN HOSPITAL ROOM: A LOT
MOVING TO AND FROM BED TO CHAIR: TOTAL
STANDING UP FROM CHAIR USING ARMS: A LOT

## 2024-11-12 ASSESSMENT — PAIN - FUNCTIONAL ASSESSMENT
PAIN_FUNCTIONAL_ASSESSMENT: 0-10

## 2024-11-12 ASSESSMENT — PAIN SCALES - GENERAL
PAINLEVEL_OUTOF10: 0 - NO PAIN

## 2024-11-12 ASSESSMENT — ACTIVITIES OF DAILY LIVING (ADL)
ADL_ASSISTANCE: NEEDS ASSISTANCE
BATHING_ASSISTANCE: MINIMAL
BATHING_ASSISTANCE: MAXIMAL

## 2024-11-12 NOTE — CARE PLAN
The clinical goals for the shift include safety, comfort, MRI, seizure monitoring    Patient oriented x3 disoriented to situation. Denies pain, vitals WNL. Taken to MRI this morning. Plan to resume EEG monitoring when back on unit. Patient complains of nonproductive cough, chest xray ordered. Patient updated and agreeable to plan of care at this time.      Problem: Skin  Goal: Participates in plan/prevention/treatment measures  Outcome: Progressing  Goal: Prevent/manage excess moisture  Outcome: Progressing  Goal: Prevent/minimize sheer/friction injuries  Outcome: Progressing  Goal: Promote/optimize nutrition  Outcome: Progressing     Problem: Pain - Adult  Goal: Verbalizes/displays adequate comfort level or baseline comfort level  Outcome: Progressing     Problem: Safety - Adult  Goal: Free from fall injury  Outcome: Progressing     Problem: Discharge Planning  Goal: Discharge to home or other facility with appropriate resources  Outcome: Progressing     Problem: Chronic Conditions and Co-morbidities  Goal: Patient's chronic conditions and co-morbidity symptoms are monitored and maintained or improved  Outcome: Progressing

## 2024-11-12 NOTE — PROGRESS NOTES
Physical Therapy    Physical Therapy Treatment    Patient Name: Josiah James  MRN: [id number]  Department: [place] [address]  Room: [address] [place] [address]  Today's Date: 11/12/2024  Time Calculation  Start Time: 1055  Stop Time: 1118  Time Calculation (min): 23 min         Assessment/Plan   PT Assessment  End of Session Communication: Bedside nurse  Assessment Comment: Pt able to peform transfer and take 1 step this session with 2-person assist. Pt remains limited by L sided weakness and decreased awareness of impairments. Pt remains appropriate for Moderate Intensity Rehab after DC.  End of Session Patient Position: Bed, 4 rail up, Alarm on     PT Plan  Treatment/Interventions: Bed mobility, Transfer training, Gait training, Stair training, Balance training, Neuromuscular re-education, Strengthening, Endurance training, Therapeutic exercise, Therapeutic activity, Home exercise program  PT Plan: Ongoing PT  PT Frequency: 3 times per week  PT Discharge Recommendations: Moderate intensity level of continued care  PT Recommended Transfer Status: Assist x2  PT - OK to Discharge: Yes (PT evaluation complete and rehav recommendations made)      General Visit Information:   PT  Visit  PT Received On: 11/12/24  Response to Previous Treatment: Patient with no complaints from previous session.  General  Missed Visit: No  Missed Visit Reason:  (--)  Family/Caregiver Present: No  Co-Treatment: OT  Co-Treatment Reason: Co-evaluation with OT for pt safety and to optimize pt's therapeutic potential  Prior to Session Communication: Bedside nurse  Patient Position Received: Bed, 4 rail up, Alarm on  Preferred Learning Style: visual, verbal  General Comment: Pt pleasant and agreeable to therapy    Subjective   Precautions:  Precautions  Hearing/Visual Limitations: [place]  Medical Precautions: Fall precautions, Seizure precautions  Precautions Comment: SBP <180    Vital Signs (Past 2hrs)        Date/Time Vitals Session Patient Position Pulse Resp SpO2 BP MAP  (mmHg)    11/12/24 1056 During PT  Lying  --  --  --  134/111  120     11/12/24 1146 --  --  --  --  96 %  134/111  120     11/12/24 1200 --  --  73  13  --  141/110  120                         Objective   Pain:  Pain Assessment  Pain Assessment: 0-10  0-10 (Numeric) Pain Score: 0 - No pain  Cognition:  Cognition  Overall Cognitive Status: Impaired  Arousal/Alertness: Appropriate responses to stimuli  Orientation Level: Disoriented to time, Disoriented to situation  Following Commands: Follows one step commands with repetition  Safety Judgment: Decreased awareness of need for assistance  Insight: Moderate  Impulsive: Mildly  Coordination:  Movements are Fluid and Coordinated: No  Lower Body Coordination: Ataxic  Postural Control:  Postural Control  Postural Control: Impaired  Trunk Control: Forward flexed  Static Sitting Balance  Static Sitting-Balance Support: Feet supported  Static Sitting-Level of Assistance: Contact guard  Static Standing Balance  Static Standing-Balance Support: Bilateral upper extremity supported (RW)  Static Standing-Level of Assistance: Maximum assistance (Max A X2)  Static Standing-Comment/Number of Minutes: 3 mins before fatiguing  Extremity/Trunk Assessments:     RLE   RLE : Exceptions to WFL  Strength RLE  RLE Overall Strength: Greater than or equal to 3/5 as evidenced by functional mobility  Strength LLE  LLE Overall Strength: Greater than or equal to 3/5 as evidenced by functional mobility  Activity Tolerance:  Activity Tolerance  Endurance: Tolerates 10 - 20 min exercise with multiple rests  Early Mobility/Exercise Safety Screen: Proceed with mobilization - No exclusion criteria met  Treatments:    Bed Mobility  Bed Mobility: Yes  Bed Mobility 1  Bed Mobility 1: Rolling right, Rolling left  Level of Assistance 1: Moderate assistance  Bed Mobility 2  Bed Mobility  2: Supine to sitting  Level of Assistance 2: Moderate assistance, +2  Bed Mobility Comments 2: HOB elevated  Bed Mobility  3  Bed Mobility 3: Sitting to supine  Level of Assistance 3: Moderate assistance, +2, Moderate verbal cues  Bed Mobility Comments 3: HOB elevated  Bed Mobility 4  Bed Mobility 4: Scooting  Level of Assistance 4: Dependent, +2  Bed Mobility Comments 4: Boost    Ambulation/Gait Training  Ambulation/Gait Training Performed: Yes  Ambulation/Gait Training 1  Surface 1: Level tile  Device 1: Rolling walker  Assistance 1: Maximum assistance (Max A +2)  Quality of Gait 1: Ataxic, Forward flexed posture, Decreased step length, Soft knee(s)  Comments/Distance (ft) 1: 1 step toward HOB  Transfers  Transfer: Yes  Transfer 1  Transfer From 1: Bed to  Transfer to 1: Stand  Technique 1: Sit to stand  Transfer Device 1: Walker  Transfer Level of Assistance 1: Maximum assistance, +2  Trials/Comments 1: Bed height elevated  Transfers 2  Transfer From 2: Stand to  Transfer to 2: Bed  Technique 2: Stand to sit  Transfer Device 2: Walker  Transfer Level of Assistance 2: Maximum assistance, +2    Stairs  Stairs: No    Outcome Measures:  [facility] Basic Mobility  Turning from your back to your side while in a flat bed without using bedrails: A lot  Moving from lying on your back to sitting on the side of a flat bed without using bedrails: A lot  Moving to and from bed to chair (including a wheelchair): Total  Standing up from a chair using your arms (e.g. wheelchair or bedside chair): Total  To walk in hospital room: Total  Climbing 3-5 steps with railing: Total  Basic Mobility - Total Score: 8    Education Documentation  Precautions, taught by Jody Boggs PT at 11/12/2024 12:14 PM.  Learner: Patient  Readiness: Acceptance  Method: Explanation  Response: Verbalizes Understanding, Demonstrated Understanding    Body Mechanics, taught by Jody Boggs PT at 11/12/2024 12:14 PM.  Learner: Patient  Readiness: Acceptance  Method: Explanation  Response: Verbalizes Understanding, Demonstrated Understanding    Mobility Training, taught by  Jody Boggs, PT at 11/12/2024 12:14 PM.  Learner: Patient  Readiness: Acceptance  Method: Explanation  Response: Verbalizes Understanding, Demonstrated Understanding    Education Comments  No comments found.        Encounter Problems       Encounter Problems (Active)       PT Problem       Pt. will perform bed mobility with min A  (Progressing)       Start:  11/11/24    Expected End:  11/25/24            Pt. will perform transfers with RW with min A  (Progressing)       Start:  11/11/24    Expected End:  11/25/24            Pt. will ambulate > 25 ft. with min A and RW for short household ambulation  (Progressing)       Start:  11/11/24    Expected End:  11/25/24            Pt. will require CGA for static/dynamic standing balance with RW to safely participate in ADLs  (Progressing)       Start:  11/11/24    Expected End:  11/25/24               Pain - Adult

## 2024-11-12 NOTE — PROGRESS NOTES
Occupational Therapy                 Therapy Communication Note    Patient Name: Josiah James  MRN: [id number]  Department: [facility]  Room: 4027/4027-A  Today's Date: 11/12/2024     Discipline: Occupational Therapy    Missed Visit Reason: Missed Visit Reason:  (Pt off the floor at MRI, will follow up as schedule permits)    Missed Time: Attempt

## 2024-11-12 NOTE — PROGRESS NOTES
Attempted to contact patient's daughter to complete assessment. Unable to reach. Number in chart says out of service. Patient medically ready per medical team. [place] referral sent to SNF. Will continue to try to contact patient's daughter. Marifer Ferguson RN, [facility]    TCC admission assessment completed with patient's girlfriend. Patient's daughter doesn't get off work until 4pm. Will attempt to contact her at that time. Phone numbers corrected in chart per patient's girlfriend, who's number was found in the ambulance report. Explained role of TCC - verbalized understanding.     Demographics/Insurance: confirmed in chart.   Living Environment: Lives in a home. Patient's daughter lives with him.   Primary Support Person: Daughter, Cecilia, [phone]. Patient's girlfriend, Latesha, stated that the daughter should be the primary contact but that she was also willing to answer questions if the daughter couldn't be reached.   PCP: Patient had a PCP through [facility] but didn't like the MD and wants a new PCP through .   Assistive Devices/DME: Doesn't walk. Uses manual wheelchair.  Home Oxygen: Denies   Dialysis: Denies   Home Care Agency: Denies   Transportation at Discharge: Anticipate patient will need transport to SNF.  Pharmacy: [facility] in [place] on [address]   Concerns about discharge: None at this time. Girlfriend notified that blanket referral was sent. She stated she would like patient to go to SNF for therapy at discharge and anticipates the daughter will be too.     Marifer Ferguson RN, [facility]

## 2024-11-12 NOTE — PROGRESS NOTES
Josiah James is a 63 y.o. male on day 4 of admission presenting with New onset seizure (Multi).    Subjective   No acute overnight events. Patient was visited this morning at bedside and was noted to have hiccups. Also endorsed nausea. Hiccups were refractory to valsalva maneuvers. Was given 1mg ativan before going for his MRI.     Otherwise patient in no acute distress.     Objective     Last Recorded Vitals  Blood pressure (!) 141/110, pulse 73, temperature 36.8 °C (98.2 °F), temperature source Temporal, resp. rate 13, SpO2 96%.    Physical Exam  Constitutional:       General: He is not in acute distress.  HENT:      Mouth/Throat:      Mouth: Mucous membranes are moist.      Pharynx: Oropharynx is clear.   Eyes:      General: Lids are normal.      Extraocular Movements: Extraocular movements intact.      Conjunctiva/sclera: Conjunctivae normal.      Comments: Pupils equal and round   Cardiovascular:      Rate and Rhythm: Normal rate and regular rhythm.   Pulmonary:      Effort: Pulmonary effort is normal.      Comments: Crackles appreciated bilaterally  Skin:     General: Skin is warm and dry.   Neurological:      Mental Status: He is alert.      Cranial Nerves: Cranial nerves 2-12 are intact.      Motor: Weakness: RLE 4/5; distal LLE 3/5.   Psychiatric:         Speech: Speech normal.       Neurological Exam  Mental Status  Alert. Oriented only to person, place and time. Speech is normal.    Cranial Nerves  CN II: Vision test: Pupils equal and round.  CN III, IV, VI: Extraocular movements intact bilaterally. Normal lids and orbits bilaterally. Pupils equal and round.  CN V:  Right: Facial sensation is normal.  Left: Facial sensation is normal on the left.  CN VII:  Right: There is no facial weakness.  Left: There is no facial weakness.  CN VIII: Hearing intact to conversation.  CN IX, X: Palate elevates symmetrically  CN XI:  Right: Sternocleidomastoid strength is normal.  Left: Sternocleidomastoid strength is  normal.  CN XII: Tongue midline without atrophy or fasciculations.    Motor  Normal muscle bulk throughout. No fasciculations present. Normal muscle tone. No abnormal involuntary movements.  Strength:   --> RUE 5/5, RLE 4+/5  --> LUE 5/5, LLE 4/5.    Sensory Extinction to double simultaneous stimulation of the left leg.  Sensory deficit LLE - extinction to light touch .    In general, exam remains unchanged from 11/12.    Scheduled medications  amLODIPine, 10 mg, oral, Daily  aspirin, 81 mg, oral, Daily  enoxaparin, 40 mg, subcutaneous, q24h  lacosamide, 100 mg, oral, q12h CHAPITO  levETIRAcetam, 1,500 mg, oral, BID  lisinopril, 20 mg, oral, Daily  pantoprazole, 40 mg, oral, Daily before breakfast  polyethylene glycol, 17 g, oral, Daily  potassium phosphate, 21 mmol, intravenous, Once  thiamine, 100 mg, oral, Daily      Continuous medications       PRN medications  PRN medications: [Held by provider] acetaminophen **OR** [Held by provider] acetaminophen, hydrALAZINE, labetaloL, LORazepam, ondansetron    Results for orders placed or performed during the hospital encounter of 11/08/24 (from the past 24 hours)   CBC and Auto Differential   Result Value Ref Range    WBC 6.5 4.4 - 11.3 x10*3/uL    nRBC 0.0 0.0 - 0.0 /100 WBCs    RBC 5.10 4.50 - 5.90 x10*6/uL    Hemoglobin 15.9 13.5 - 17.5 g/dL    Hematocrit 47.0 41.0 - 52.0 %    MCV 92 80 - 100 fL    MCH 31.2 26.0 - 34.0 pg    MCHC 33.8 32.0 - 36.0 g/dL    RDW 12.5 11.5 - 14.5 %    Platelets 235 150 - 450 x10*3/uL    Neutrophils % 73.5 40.0 - 80.0 %    Immature Granulocytes %, Automated 0.3 0.0 - 0.9 %    Lymphocytes % 17.7 13.0 - 44.0 %    Monocytes % 7.4 2.0 - 10.0 %    Eosinophils % 0.3 0.0 - 6.0 %    Basophils % 0.8 0.0 - 2.0 %    Neutrophils Absolute 4.77 1.20 - 7.70 x10*3/uL    Immature Granulocytes Absolute, Automated 0.02 0.00 - 0.70 x10*3/uL    Lymphocytes Absolute 1.15 (L) 1.20 - 4.80 x10*3/uL    Monocytes Absolute 0.48 0.10 - 1.00 x10*3/uL    Eosinophils Absolute  0.02 0.00 - 0.70 x10*3/uL    Basophils Absolute 0.05 0.00 - 0.10 x10*3/uL   Magnesium   Result Value Ref Range    Magnesium 2.23 1.60 - 2.40 mg/dL   Renal Function Panel   Result Value Ref Range    Glucose 61 (L) 74 - 99 mg/dL    Sodium 141 136 - 145 mmol/L    Potassium 3.4 (L) 3.5 - 5.3 mmol/L    Chloride 103 98 - 107 mmol/L    Bicarbonate 26 21 - 32 mmol/L    Anion Gap 15 10 - 20 mmol/L    Urea Nitrogen 19 6 - 23 mg/dL    Creatinine 0.93 0.50 - 1.30 mg/dL    eGFR >90 >60 mL/min/1.73m*2    Calcium 8.8 8.6 - 10.6 mg/dL    Phosphorus 2.4 (L) 2.5 - 4.9 mg/dL    Albumin 4.1 3.4 - 5.0 g/dL     cvEEG 11/8: This EEG shows left frontal Status epilepticus and is indicative of severe diffuse encephalopathy.   -----------    cvEEG 11/9-11/10:  This cEEG shows frequent right foot tonic seizures with EEG seizure pattern over the left frontal lobe and is indicative a a severe diffuse encephalopathy. Seizures decrease in frequency through the course of the record to around 5 every 6 hours. The last seizure captured was at 4:43 AM on 11/10/24.    MR brain wo IV contrast    Result Date: 11/12/2024  Interpreted By:  Montana Sierra, STUDY: MR BRAIN WO IV CONTRAST; ;  11/12/2024 10:21 am   INDICATION: Signs/Symptoms:New onset seizure.     COMPARISON: CT head from 11/08/2024.   ACCESSION NUMBER(S): [id number]   ORDERING CLINICIAN: KING RAYMUNDO   TECHNIQUE: MRI of the brain was performed with the acquisition of axial diffusion-weighted, axial FLAIR, axial T2 gradient echo, axial T2 fat saturated, coronal T2, sagittal T1 MP-RAGE with multiplanar reformats, axial T2 gradient echo sequence, and coronal FLAIR sequence.   FINDINGS: Evaluation is degraded due to patient motion.   There is a punctate focus of restricted diffusion located within the right cerebellum (series 15, image 68 of 78) which is most consistent with an acute or subacute infarct. There is no hemorrhagic transformation or mass effect.   There is T2  hyperintense signal located throughout the left hippocampus which may reflect edema and related to seizure activity.   There is an old infarct located within the right frontal parietal lobes and old infarct located within the left occipital lobe. There is associated ex vacuo dilatation of the atrium of the right lateral ventricle and atrium and occipital horn of the left lateral ventricle, respectively. There is hemosiderin deposition within the linings of the old infarcts. There is T2 hyperintense signal located within the white matter inferior to the infarcted right frontal parietal lobes which is most consistent with gliosis.   There are multiple regions of T2 hyperintensity within the cerebral hemispheric white matter and laura which are probably a sequela of chronic small vessel ischemic changes. T2 hyperintense signal within the splenium of the corpus callosum is likely related to old infarct within the left occipital lobe with resulting white matter injury.   The gyri in the bilateral cerebral hemispheres are otherwise unremarkable in appearance there is no evidence of cortical dysplasia or gray matter heterotopia. Corpus callosum demonstrates mild volume loss, primarily within the body and probably age related.   There is mild mucosal thickening located within the ethmoid air cells and within the left maxillary sinus. Mastoid air cells are clear.       Evaluation is degraded due to patient motion.   1. Punctate acute or subacute infarct in the right cerebellum.   2. Signal abnormality throughout the left hippocampus could reflect sequela of seizure activity.   3. Old infarcts located within the right frontal parietal lobes and the left occipital lobe with hemosiderin deposition and ex vacuo dilatation of the adjacent ventricles.   4. Findings of probable chronic small vessel ischemic changes.   This study was interpreted at [facility].   MACRO: None   Signed by: Montana Sierra  11/12/2024 12:42 PM Dictation workstation:   [id number]    EEG    IMPRESSION Impression This cEEG is indicative of left hemispheric epileptogenic structural lesion with right hemispheric structural lesion and a severe diffuse encephalopathy. Fourteen left hemispheric subclinical seizures are seen (last on 11/10 at 9AM) Multiple non epilept ic events of hiccups and right foot tonic were seen with no EEG correlate. A full report will be scanned into the patient's chart at a later time. This report has been interpreted and electronically signed by       Assessment/Plan   Josiah James is a 63 y.o. male with PMH of hypertension, osteoarthritis, prior R parietal and left occipital strokes in 2022 etiology unknown, presented initially with encephalopathy, had 2 episodes of generalized tonic clonic in the ED. He was confused earlier in the day, daughter called EMS. Was found to have expressive aphasia in the ED. BAT activated but no evidence of stroke. He had 2 episodes of seizure activity after the BAT. S/p 4 mg ativan and 4.5 g keppra. Encephalopathic on exam but his left side is weaker which fits with his baseline. His presentation is concerning for seizures given his prior strokes. Routine EEG showed seizure activity. Vimpat was added and keppra was increased. Mental status and seizure frequency improving since admission.     MRI brain wo contrast was performed on 11/12. This demonstrated signal abnormality throughout the left hippocampus that is likely a reflection of his seizure activity. Given his imaging results, it is likely his seizures are originating from his prior L occipital stroke.     He also has bilateral crackles on exam. He is currently afebrile & blood cultures are negative. It is possible that his lung sounds could correlate with his hiccups, causes including pneumonia &/or aspiration pneumonitis or GERD. However, at present, he is afebrile without leukocytosis.     Updates 11/12/24  - Hiccups  persisted this AM & were refractory to valsalva; PPI started  - MRI brain (11/12): showed signal abnormality throughout L hippocampus likely as a result of his seizure activity   - Discontinued cvEEG.  - Held acetaminophen so as not to mask possible fever   - CXR done 11/12 pending read     # Seizures   # Encephalopathy  4D Classification of the Paroxysmal Episodes:     Epileptic  Episodes semiology: Version -> GTC  Frequency: Twice day of admission  History of Status Epilepticus: No  Localization: Unknown  Etiology: Likely prior R parietal and left occipital infarcts   Co-morbidities: HTN, HLD     :: S/p 4 mg ativan + 4.5 g Keppra  :: Mental status improving with seizure control  :: cvEEG D/C'd on 11/12  - CTH and CTP showing no acute process  - Keppra 1500 mg BID  - Vimpat 100 mg BID   - MRI brain (11/12): showed signal abnormality throughout L hippocampus likely as a result of his seizure activity     # HTN  - SBP < 180   - IV hydralazine and labetalol pushes PRN  - Continue home meds amlodipine 10 mg daily and lisinopril 20 mg daily  - Cardene drip if required multiple PRNs  - Cont. ASA 81 mg daily     # Leukocytosis (resolved)  ::WBC 13.3 on 11/9 from 6; WBC 6.5 as of 11/12  - Afebrile  - Repeat CXR 11/12 performed; pending read  - BCX - NGTD x2  - Low threshold to start empiric antibiotics if febrile    F: N/A  E: Replete PRN  N: N/A     Last BM: Last BM Date: 11/12/24     GIppx: Pantoprazole 40mg daily     DVTppx: Lovenox     Code Status: Full Code   Emergency contact: DYLLAN KELLEY (Daughter)  [phone] - number not working     Patient discussed and seen by attending physician Dr. Erwin Ford, MS-3    Bhavya Turner MD  Neurology, PGY-1

## 2024-11-12 NOTE — PROGRESS NOTES
Physical Therapy                 Therapy Communication Note    Patient Name: Josiah James  MRN: [id number]  Department: [facility] MRI  Room: [facility]-A  Today's Date: 11/12/2024     Discipline: Physical Therapy    Missed Visit Reason: Missed Visit Reason:  (Pt off floor at MRI, will follow as able)    Missed Time: Attempt

## 2024-11-12 NOTE — PROGRESS NOTES
Occupational Therapy    Evaluation    Patient Name: Josiah James  MRN: [id number]  Department: [place] [address]  Room: [address] [place] [address]  Today's Date: 11/12/2024  Time Calculation  Start Time: 1055  Stop Time: 1116  Time Calculation (min): 21 min        Assessment:  Prognosis: Good  Barriers to Discharge: None  Evaluation/Treatment Tolerance: Patient limited by fatigue  Medical Staff Made Aware: Yes  End of Session Communication: Bedside nurse  End of Session Patient Position: Bed, 4 rail up, Alarm on  OT Assessment Results: Decreased ADL status, Decreased safe judgment during ADL, Decreased cognition, Decreased endurance, Decreased functional mobility, Decreased fine motor control, Decreased gross motor control  Prognosis: Good  Barriers to Discharge: None  Evaluation/Treatment Tolerance: Patient limited by fatigue  Medical Staff Made Aware: Yes  Strengths: Attitude of self, Support of Caregivers  Barriers to Participation: Comorbidities, Insight into problems  Plan:  Treatment Interventions: ADL retraining, Functional transfer training, UE strengthening/ROM, Endurance training, Cognitive reorientation, Patient/family training, Equipment evaluation/education, Fine motor coordination activities, Compensatory technique education  OT Frequency: 3 times per week  OT Discharge Recommendations: Moderate intensity level of continued care  Equipment Recommended upon Discharge:  (TBD)  OT Recommended Transfer Status: Assist of 2  OT - OK to Discharge:  (eval complete)  Treatment Interventions: ADL retraining, Functional transfer training, UE strengthening/ROM, Endurance training, Cognitive reorientation, Patient/family training, Equipment evaluation/education, Fine motor coordination activities, Compensatory technique education    Subjective   Current Problem:  1. New onset seizure (Multi)  EEG    EEG      2. Seizures (Multi)  Cardiac device check - Inpatient    Cardiac device check - Inpatient        General:  General  Reason for  Referral: presented initially with encephalopathy, had 2 episodes of generalized tonic clonic in the ED. CT head: No acute intracranial abnormality or mass effec  Past Medical History Relevant to Rehab: PMH of hypertension, osteoarthritis, prior R parietal and left occipital strokes in 2022 etiology unknown,  Missed Visit: Yes  Missed Visit Reason:  (Pt off the floor at MRI, will follow up as schedule permits)  Family/Caregiver Present: No  Co-Treatment: PT  Co-Treatment Reason: Co-evaluation with PT for pt safety and to optimize pt's therapeutic potential  Prior to Session Communication: Bedside nurse  Patient Position Received: Bed, 4 rail up, Alarm on  Preferred Learning Style: visual, verbal  General Comment: Pt pleasant and agreeable to therapy  Precautions:  Hearing/Visual Limitations: [place]  Medical Precautions: Fall precautions, Seizure precautions  Precautions Comment: SBP <180    Vital Sign (Past 2hrs)        Date/Time Vitals Session Patient Position Pulse Resp SpO2 BP MAP (mmHg)    11/12/24 1146 --  --  --  --  96 %  134/111  120     11/12/24 1200 --  --  73  13  --  141/110  120                   Vital Signs Comment: During session: /111     Pain:  Pain Assessment  Pain Assessment: 0-10  0-10 (Numeric) Pain Score: 0 - No pain    Objective   Cognition:  Overall Cognitive Status: Impaired  Arousal/Alertness: Appropriate responses to stimuli  Orientation Level: Disoriented to time, Disoriented to situation  Following Commands: Follows one step commands with repetition  Safety Judgment: Decreased awareness of need for safety precautions  Problem Solving: Assistance required to identify errors made  Insight: Moderate  Impulsive: Mildly           Home Living:  Type of Home: House  Lives With:  (dtr and 2 grandkids)  Home Adaptive Equipment: Walker rolling or standard, Wheelchair-manual  Home Layout: Two level, Able to live on main level with bedroom/bathroom  Home Access: Level entry  Bathroom Shower/Tub:  Tub/shower unit  Home Living Comments: pt overall questionable historian  Prior Function:  Level of [place]: Needs assistance with ADLs, Needs assistance with homemaking  Receives Help From: Family  ADL Assistance: Needs assistance  Bath: Minimal  Homemaking Assistance:  (family completes)  Ambulatory Assistance:  (uses FWW in home, w/c in community)  Vocational: Retired  IADL History:     ADL:  Eating Assistance: Stand by  Eating Deficit:  (anticipated)  Grooming Assistance: Minimal  Grooming Deficit:  (anticipated 2/2 UE weakness and coordination deficits)  Bathing Assistance: Maximal  Bathing Deficit:  (anticipated)  UE Dressing Assistance: Maximal  UE Dressing Deficit:  (don/doff gown)  LE Dressing Assistance: Total  LE Dressing Deficit: Don/doff R sock, Don/doff L sock  Toileting Assistance with Device: Total  Toileting Deficit:  (pt incontinent, requires total A for bed level lo care)  Activity Tolerance:  Endurance: Tolerates 10 - 20 min exercise with multiple rests  Bed Mobility/Transfers: Bed Mobility  Bed Mobility: Yes  Bed Mobility 1  Bed Mobility 1: Rolling right, Rolling left  Level of Assistance 1: Moderate assistance  Bed Mobility Comments 1: Verbal/tactile cueing for mechancis and positioning  Bed Mobility 2  Bed Mobility  2: Supine to sitting, Sitting to supine  Level of Assistance 2: Moderate assistance, +2  Bed Mobility Comments 2: HOB elevated, verbal/tactile cueing for mechanics and sequencing. Increased physical assist for L hemibody.    Transfers  Transfer: Yes  Transfer 1  Transfer From 1: Bed to  Transfer to 1: Sit, Stand  Technique 1: Sit to stand, Stand to sit  Transfer Device 1: Walker  Transfer Level of Assistance 1: Maximum assistance, +2  Trials/Comments 1: Bed height elevated. Verbal/tactile cueing for hand placement (pt frequently attempting to place BUEs on walker). Decreased spatial awareness of LLE. Tactile cueing for LLE placement.      Functional Mobility:  Functional  Mobility  Functional Mobility Performed: No  Sitting Balance:  Static Sitting Balance  Static Sitting-Balance Support: Bilateral upper extremity supported  Static Sitting-Level of Assistance: Contact guard  Dynamic Sitting Balance  Dynamic Sitting-Balance Support: Bilateral upper extremity supported  Dynamic Sitting-Level of Assistance: Minimum assistance  Standing Balance:  Static Standing Balance  Static Standing-Balance Support: Bilateral upper extremity supported  Static Standing-Level of Assistance: Maximum assistance (x2)  Dynamic Standing Balance  Dynamic Standing-Balance Support: Bilateral upper extremity supported  Dynamic Standing-Level of Assistance: Maximum assistance (x2)   Modalities:     Vision:Vision - Basic Assessment  Current Vision:  (pt reports typically wears glasses but not present)  Sensation:  Light Touch: No apparent deficits  Strength:  Strength Comments: Pt with baseline LUE weakness. LUE 3-/5, RUE 4-/5  Perception:  Inattention/Neglect: Cues to attend to left side of body  Initiation: Cues to initiate tasks  Motor Planning:  (cues to sequence tasks)  Perseveration: Not present  Coordination:  Movements are Fluid and Coordinated: No  Upper Body Coordination: LUE dysmetria  Lower Body Coordination: ataxic  Finger to Nose: Impaired  Rapid Alternating Movements: Impaired   Hand Function:  Gross Grasp: Impaired (L hand, digits flexed)  Coordination: Impaired (L hand, digits flexed)  Extremities: RUE   RUE : Within Functional Limits and LUE   LUE: Within Functional Limits    Outcome Measures:[facility] Daily Activity  Putting on and taking off regular lower body clothing: Total  Bathing (including washing, rinsing, drying): A lot  Putting on and taking off regular upper body clothing: A lot  Toileting, which includes using toilet, bedpan or urinal: Total  Taking care of personal grooming such as brushing teeth: A lot  Eating Meals: A little  Daily Activity - Total Score: 11    OT Adult Other Outcome  Measures  4AT: 8    Education Documentation  Body Mechanics, taught by Lala Peña OT at 11/12/2024 12:10 PM.  Learner: Patient  Readiness: Acceptance  Method: Explanation, Demonstration  Response: Needs Reinforcement    ADL Training, taught by Lala Peña OT at 11/12/2024 12:10 PM.  Learner: Patient  Readiness: Acceptance  Method: Explanation, Demonstration  Response: Needs Reinforcement    EDUCATION:  Education  Individual(s) Educated: Patient  Education Provided: Diagnosis & Precautions, Fall precautons, Risk and benefits of OT discussed with patient or other  Patient Response to Education:  (requires continued education due to cognitive deficits)    Goals:  Encounter Problems       Encounter Problems (Active)       ADLs       Patient with complete upper body dressing with stand by assist level of assistance donning and doffing all UE clothes with PRN adaptive equipment (Progressing)       Start:  11/12/24    Expected End:  11/26/24            Patient with complete lower body dressing with moderate assist level of assistance donning and doffing all LE clothes  with PRN adaptive equipment (Not Progressing)       Start:  11/12/24    Expected End:  11/26/24            Patient will complete daily grooming tasks with set-up level of assistance and PRN adaptive equipment while edge of bed . (Progressing)       Start:  11/12/24    Expected End:  11/26/24            Patient will complete toileting including hygiene clothing management/hygiene with moderate assist level of assistance. (Not Progressing)       Start:  11/12/24    Expected End:  11/26/24               COGNITION/SAFETY       Patient will score WFL on standardized cognitive assessment within reasonable time frame (Progressing)       Start:  11/12/24    Expected End:  11/26/24               TRANSFERS       Patient will perform bed mobility minimal assist  level of assistance in order to improve safety and independence with mobility (Progressing)        Start:  11/12/24    Expected End:  11/26/24            Patient will complete functional transfer to chair/commode with least restrictive device with moderate assist level of assistance. (Progressing)       Start:  11/12/24    Expected End:  11/26/24

## 2024-11-13 LAB
ALBUMIN SERPL BCP-MCNC: 3.8 G/DL (ref 3.4–5)
ANION GAP SERPL CALC-SCNC: 11 MMOL/L (ref 10–20)
BACTERIA BLD CULT: NORMAL
BACTERIA BLD CULT: NORMAL
BASOPHILS # BLD AUTO: 0.07 X10*3/UL (ref 0–0.1)
BASOPHILS NFR BLD AUTO: 1.2 %
BUN SERPL-MCNC: 18 MG/DL (ref 6–23)
CALCIUM SERPL-MCNC: 8.7 MG/DL (ref 8.6–10.6)
CHLORIDE SERPL-SCNC: 103 MMOL/L (ref 98–107)
CHOLEST SERPL-MCNC: 149 MG/DL (ref 0–199)
CHOLESTEROL/HDL RATIO: 4.2
CO2 SERPL-SCNC: 28 MMOL/L (ref 21–32)
CREAT SERPL-MCNC: 0.89 MG/DL (ref 0.5–1.3)
EGFRCR SERPLBLD CKD-EPI 2021: >90 ML/MIN/1.73M*2
EOSINOPHIL # BLD AUTO: 0.09 X10*3/UL (ref 0–0.7)
EOSINOPHIL NFR BLD AUTO: 1.5 %
ERYTHROCYTE [DISTWIDTH] IN BLOOD BY AUTOMATED COUNT: 12.3 % (ref 11.5–14.5)
GLUCOSE SERPL-MCNC: 71 MG/DL (ref 74–99)
HCT VFR BLD AUTO: 46.2 % (ref 41–52)
HDLC SERPL-MCNC: 35.8 MG/DL
HGB BLD-MCNC: 15.8 G/DL (ref 13.5–17.5)
IMM GRANULOCYTES # BLD AUTO: 0.04 X10*3/UL (ref 0–0.7)
IMM GRANULOCYTES NFR BLD AUTO: 0.7 % (ref 0–0.9)
LDLC SERPL CALC-MCNC: 94 MG/DL
LYMPHOCYTES # BLD AUTO: 1.72 X10*3/UL (ref 1.2–4.8)
LYMPHOCYTES NFR BLD AUTO: 28.6 %
MAGNESIUM SERPL-MCNC: 2.06 MG/DL (ref 1.6–2.4)
MCH RBC QN AUTO: 31 PG (ref 26–34)
MCHC RBC AUTO-ENTMCNC: 34.2 G/DL (ref 32–36)
MCV RBC AUTO: 91 FL (ref 80–100)
MONOCYTES # BLD AUTO: 0.51 X10*3/UL (ref 0.1–1)
MONOCYTES NFR BLD AUTO: 8.5 %
NEUTROPHILS # BLD AUTO: 3.58 X10*3/UL (ref 1.2–7.7)
NEUTROPHILS NFR BLD AUTO: 59.5 %
NON HDL CHOLESTEROL: 113 MG/DL (ref 0–149)
NRBC BLD-RTO: 0 /100 WBCS (ref 0–0)
PHOSPHATE SERPL-MCNC: 2.4 MG/DL (ref 2.5–4.9)
PLATELET # BLD AUTO: 216 X10*3/UL (ref 150–450)
POTASSIUM SERPL-SCNC: 3.2 MMOL/L (ref 3.5–5.3)
RBC # BLD AUTO: 5.1 X10*6/UL (ref 4.5–5.9)
SODIUM SERPL-SCNC: 139 MMOL/L (ref 136–145)
TRIGL SERPL-MCNC: 98 MG/DL (ref 0–149)
VLDL: 20 MG/DL (ref 0–40)
WBC # BLD AUTO: 6 X10*3/UL (ref 4.4–11.3)

## 2024-11-13 PROCEDURE — 80061 LIPID PANEL: CPT

## 2024-11-13 PROCEDURE — 2500000001 HC RX 250 WO HCPCS SELF ADMINISTERED DRUGS (ALT 637 FOR MEDICARE OP)

## 2024-11-13 PROCEDURE — 2500000004 HC RX 250 GENERAL PHARMACY W/ HCPCS (ALT 636 FOR OP/ED)

## 2024-11-13 PROCEDURE — 93010 ELECTROCARDIOGRAM REPORT: CPT | Performed by: INTERNAL MEDICINE

## 2024-11-13 PROCEDURE — 1100000001 HC PRIVATE ROOM DAILY

## 2024-11-13 PROCEDURE — 83735 ASSAY OF MAGNESIUM: CPT

## 2024-11-13 PROCEDURE — 2500000005 HC RX 250 GENERAL PHARMACY W/O HCPCS

## 2024-11-13 PROCEDURE — 99232 SBSQ HOSP IP/OBS MODERATE 35: CPT

## 2024-11-13 PROCEDURE — 85025 COMPLETE CBC W/AUTO DIFF WBC: CPT

## 2024-11-13 PROCEDURE — 36415 COLL VENOUS BLD VENIPUNCTURE: CPT

## 2024-11-13 PROCEDURE — 84100 ASSAY OF PHOSPHORUS: CPT

## 2024-11-13 RX ORDER — GABAPENTIN 100 MG/1
100 CAPSULE ORAL ONCE
Status: DISCONTINUED | OUTPATIENT
Start: 2024-11-13 | End: 2024-11-13

## 2024-11-13 RX ORDER — GABAPENTIN 100 MG/1
100 CAPSULE ORAL ONCE
Status: COMPLETED | OUTPATIENT
Start: 2024-11-13 | End: 2024-11-13

## 2024-11-13 ASSESSMENT — COGNITIVE AND FUNCTIONAL STATUS - GENERAL
MOBILITY SCORE: 11
STANDING UP FROM CHAIR USING ARMS: A LOT
MOVING FROM LYING ON BACK TO SITTING ON SIDE OF FLAT BED WITH BEDRAILS: A LITTLE
DRESSING REGULAR LOWER BODY CLOTHING: A LITTLE
TURNING FROM BACK TO SIDE WHILE IN FLAT BAD: A LOT
CLIMB 3 TO 5 STEPS WITH RAILING: A LOT
TOILETING: A LOT
WALKING IN HOSPITAL ROOM: TOTAL
STANDING UP FROM CHAIR USING ARMS: A LOT
MOVING TO AND FROM BED TO CHAIR: A LOT
TURNING FROM BACK TO SIDE WHILE IN FLAT BAD: A LOT
DRESSING REGULAR LOWER BODY CLOTHING: A LOT
HELP NEEDED FOR BATHING: A LOT
TOILETING: TOTAL
EATING MEALS: A LITTLE
CLIMB 3 TO 5 STEPS WITH RAILING: TOTAL
EATING MEALS: A LITTLE
DAILY ACTIVITIY SCORE: 14
PERSONAL GROOMING: TOTAL
WALKING IN HOSPITAL ROOM: TOTAL
MOVING TO AND FROM BED TO CHAIR: A LOT
MOVING FROM LYING ON BACK TO SITTING ON SIDE OF FLAT BED WITH BEDRAILS: A LITTLE
DRESSING REGULAR UPPER BODY CLOTHING: TOTAL
DAILY ACTIVITIY SCORE: 9
MOBILITY SCORE: 12
DRESSING REGULAR UPPER BODY CLOTHING: A LOT
HELP NEEDED FOR BATHING: TOTAL
PERSONAL GROOMING: A LOT

## 2024-11-13 ASSESSMENT — PAIN SCALES - WONG BAKER: WONGBAKER_NUMERICALRESPONSE: NO HURT

## 2024-11-13 ASSESSMENT — PAIN SCALES - GENERAL: PAINLEVEL_OUTOF10: 0 - NO PAIN

## 2024-11-13 NOTE — PROGRESS NOTES
Josiah James is a 63 y.o. male on day 5 of admission presenting with New onset seizure (Multi).    Subjective   No acute overnight events. Patient was visited this morning, was resting in bed in no acute distress. He said he slept well; he had hiccups last night that resolved around midnight. Patient was counseled on expectations for SNF rehab.    Objective     Last Recorded Vitals  Blood pressure 125/80, pulse 70, temperature 37 °C (98.6 °F), temperature source Temporal, resp. rate 17, SpO2 98%.    Physical Exam  Constitutional:       General: He is not in acute distress.  HENT:      Mouth/Throat:      Mouth: Mucous membranes are moist.      Pharynx: Oropharynx is clear.   Eyes:      General: Lids are normal.      Extraocular Movements: Extraocular movements intact.      Conjunctiva/sclera: Conjunctivae normal.      Comments: Pupils equal and round   Cardiovascular:      Rate and Rhythm: Normal rate and regular rhythm.   Pulmonary:      Effort: Pulmonary effort is normal.      Comments: Crackles & wheezing appreciated bilaterally; improved from prior exams  Skin:     General: Skin is warm and dry.   Neurological:      Mental Status: He is alert.      Cranial Nerves: Cranial nerves 2-12 are intact.      Motor: Weakness: RLE 4/5; distal LLE 3/5.   Psychiatric:         Speech: Speech normal.       Neurological Exam  Mental Status  Alert. Oriented only to person, place and time. Speech is normal.    Cranial Nerves  CN II: Vision test: Pupils equal and round.  CN III, IV, VI: Extraocular movements intact bilaterally. Normal lids and orbits bilaterally. Pupils equal and round.  CN V:  Right: Facial sensation is normal.  Left: Facial sensation is normal on the left.  CN VII:  Right: There is no facial weakness.  Left: There is no facial weakness.  CN VIII: Hearing intact to conversation.  CN IX, X: Palate elevates symmetrically  CN XI:  Right: Sternocleidomastoid strength is normal.  Left: Sternocleidomastoid strength is  normal.  CN XII: Tongue midline without atrophy or fasciculations.    Motor  Normal muscle bulk throughout. No fasciculations present. Normal muscle tone. No abnormal involuntary movements.  Strength:   --> RUE 5/5, RLE 4+/5  --> LUE 5/5, LLE 4/5.    Sensory Extinction to double simultaneous stimulation of the left leg.  Sensory deficit LLE - extinction to light touch .    Coordination  Right: Finger-to-nose normal.Left: Rapid alternating movement abnormality:  Left-sided dysdiadochokinesia.    Scheduled medications  amLODIPine, 10 mg, oral, Daily  aspirin, 81 mg, oral, Daily  atorvastatin, 40 mg, oral, Nightly  enoxaparin, 40 mg, subcutaneous, q24h  lacosamide, 100 mg, oral, q12h CHAPITO  levETIRAcetam, 1,500 mg, oral, BID  lisinopril, 20 mg, oral, Daily  pantoprazole, 40 mg, oral, Daily before breakfast  polyethylene glycol, 17 g, oral, Daily  thiamine, 100 mg, oral, Daily      Continuous medications       PRN medications  PRN medications: [Held by provider] acetaminophen **OR** [Held by provider] acetaminophen, dextrose, dextrose, glucagon, glucagon, hydrALAZINE, labetaloL, LORazepam, ondansetron    Results for orders placed or performed during the hospital encounter of 11/08/24 (from the past 24 hours)   Magnesium   Result Value Ref Range    Magnesium 2.06 1.60 - 2.40 mg/dL   Renal Function Panel   Result Value Ref Range    Glucose 71 (L) 74 - 99 mg/dL    Sodium 139 136 - 145 mmol/L    Potassium 3.2 (L) 3.5 - 5.3 mmol/L    Chloride 103 98 - 107 mmol/L    Bicarbonate 28 21 - 32 mmol/L    Anion Gap 11 10 - 20 mmol/L    Urea Nitrogen 18 6 - 23 mg/dL    Creatinine 0.89 0.50 - 1.30 mg/dL    eGFR >90 >60 mL/min/1.73m*2    Calcium 8.7 8.6 - 10.6 mg/dL    Phosphorus 2.4 (L) 2.5 - 4.9 mg/dL    Albumin 3.8 3.4 - 5.0 g/dL   CBC and Auto Differential   Result Value Ref Range    WBC 6.0 4.4 - 11.3 x10*3/uL    nRBC 0.0 0.0 - 0.0 /100 WBCs    RBC 5.10 4.50 - 5.90 x10*6/uL    Hemoglobin 15.8 13.5 - 17.5 g/dL    Hematocrit 46.2 41.0  - 52.0 %    MCV 91 80 - 100 fL    MCH 31.0 26.0 - 34.0 pg    MCHC 34.2 32.0 - 36.0 g/dL    RDW 12.3 11.5 - 14.5 %    Platelets 216 150 - 450 x10*3/uL    Neutrophils % 59.5 40.0 - 80.0 %    Immature Granulocytes %, Automated 0.7 0.0 - 0.9 %    Lymphocytes % 28.6 13.0 - 44.0 %    Monocytes % 8.5 2.0 - 10.0 %    Eosinophils % 1.5 0.0 - 6.0 %    Basophils % 1.2 0.0 - 2.0 %    Neutrophils Absolute 3.58 1.20 - 7.70 x10*3/uL    Immature Granulocytes Absolute, Automated 0.04 0.00 - 0.70 x10*3/uL    Lymphocytes Absolute 1.72 1.20 - 4.80 x10*3/uL    Monocytes Absolute 0.51 0.10 - 1.00 x10*3/uL    Eosinophils Absolute 0.09 0.00 - 0.70 x10*3/uL    Basophils Absolute 0.07 0.00 - 0.10 x10*3/uL   Lipid panel   Result Value Ref Range    Cholesterol 149 0 - 199 mg/dL    HDL-Cholesterol 35.8 mg/dL    Cholesterol/HDL Ratio 4.2     LDL Calculated 94 <=99 mg/dL    VLDL 20 0 - 40 mg/dL    Triglycerides 98 0 - 149 mg/dL    Non HDL Cholesterol 113 0 - 149 mg/dL     cvEEG 11/8: This EEG shows left frontal Status epilepticus and is indicative of severe diffuse encephalopathy.   -----------    cvEEG 11/9-11/10:  This cEEG shows frequent right foot tonic seizures with EEG seizure pattern over the left frontal lobe and is indicative a a severe diffuse encephalopathy. Seizures decrease in frequency through the course of the record to around 5 every 6 hours. The last seizure captured was at 4:43 AM on 11/10/24.    -----------    cvEEG 11/11-11/12:  This cEEG is indicative of left hemispheric epileptogenic structural lesion with right hemispheric structural lesion and a severe diffuse encephalopathy. Multiple non epileptic events of hiccups were seen with no EEG correlate.     XR chest 2 views    Result Date: 11/13/2024  Interpreted By:  Sean Toribio, STUDY: XR CHEST 2 VIEWS;  11/12/2024 10:57 am   INDICATION: Signs/Symptoms:Concern for aspiration pneumonitis.     COMPARISON: None.   ACCESSION NUMBER(S): [id number]   ORDERING CLINICIAN:  KING RAYMUNDO   TECHNIQUE: Two views of the chest utilizing dual energy subtraction   FINDINGS: The heart is normal in size. Atheromatous disease of the aortic arch is noted. A loop recorder overlies the left lower thorax.   Lungs are well aerated. No acute pulmonary infiltrates, consolidations or significant effusions are noted.   Osseous and articular anatomy is normal for age.       1.  No evidence of acute cardiopulmonary process.       MACRO: None   Signed by: Sean Toribio 11/13/2024 9:02 AM Dictation workstation:   [id number]    EEG    IMPRESSION Impression This cEEG is indicative of left hemispheric epileptogenic structural lesion with right hemispheric structural lesion and a severe diffuse encephalopathy. Multiple non epileptic events of hiccups were seen with no EEG correlate. A full report will be scanned into the patient's chart at a later time. This report has been interpreted and electronically signed by    MR brain wo IV contrast    Result Date: 11/12/2024  Interpreted By:  Montana Sierra, STUDY: MR BRAIN WO IV CONTRAST; ;  11/12/2024 10:21 am   INDICATION: Signs/Symptoms:New onset seizure.     COMPARISON: CT head from 11/08/2024.   ACCESSION NUMBER(S): [id number]   ORDERING CLINICIAN: KING RAYMUNDO   TECHNIQUE: MRI of the brain was performed with the acquisition of axial diffusion-weighted, axial FLAIR, axial T2 gradient echo, axial T2 fat saturated, coronal T2, sagittal T1 MP-RAGE with multiplanar reformats, axial T2 gradient echo sequence, and coronal FLAIR sequence.   FINDINGS: Evaluation is degraded due to patient motion.   There is a punctate focus of restricted diffusion located within the right cerebellum (series 15, image 68 of 78) which is most consistent with an acute or subacute infarct. There is no hemorrhagic transformation or mass effect.   There is T2 hyperintense signal located throughout the left hippocampus which may reflect edema and related to  seizure activity.   There is an old infarct located within the right frontal parietal lobes and old infarct located within the left occipital lobe. There is associated ex vacuo dilatation of the atrium of the right lateral ventricle and atrium and occipital horn of the left lateral ventricle, respectively. There is hemosiderin deposition within the linings of the old infarcts. There is T2 hyperintense signal located within the white matter inferior to the infarcted right frontal parietal lobes which is most consistent with gliosis.   There are multiple regions of T2 hyperintensity within the cerebral hemispheric white matter and laura which are probably a sequela of chronic small vessel ischemic changes. T2 hyperintense signal within the splenium of the corpus callosum is likely related to old infarct within the left occipital lobe with resulting white matter injury.   The gyri in the bilateral cerebral hemispheres are otherwise unremarkable in appearance there is no evidence of cortical dysplasia or gray matter heterotopia. Corpus callosum demonstrates mild volume loss, primarily within the body and probably age related.   There is mild mucosal thickening located within the ethmoid air cells and within the left maxillary sinus. Mastoid air cells are clear.       Evaluation is degraded due to patient motion.   1. Punctate acute or subacute infarct in the right cerebellum.   2. Signal abnormality throughout the left hippocampus could reflect sequela of seizure activity.   3. Old infarcts located within the right frontal parietal lobes and the left occipital lobe with hemosiderin deposition and ex vacuo dilatation of the adjacent ventricles.   4. Findings of probable chronic small vessel ischemic changes.   This study was interpreted at [facility].   MACRO: None   Signed by: Montana Sierra 11/12/2024 12:42 PM Dictation workstation:   [id number]       Assessment/Plan   Josiah alba a  63 y.o. male with PMH of hypertension, osteoarthritis, prior R parietal and left occipital strokes in 2022 etiology unknown, presented initially with encephalopathy, had 2 episodes of generalized tonic clonic in the ED. He was confused earlier in the day, daughter called EMS. Was found to have expressive aphasia in the ED. BAT activated but no evidence of stroke. He had 2 episodes of seizure activity after the BAT. S/p 4 mg ativan and 4.5 g keppra. Encephalopathic on exam but his left side is weaker which fits with his baseline. His presentation is concerning for seizures given his prior strokes. Routine EEG showed seizure activity. Vimpat was added and keppra was increased. Mental status and seizure frequency improving since admission.     MRI brain wo contrast was performed on 11/12. This demonstrated signal abnormality throughout the left hippocampus that is likely a reflection of his seizure activity. Given his imaging results, it is likely his seizures are originating from his prior L occipital stroke. A punctate focus of restricted diffusion within the right cerebellum was also discovered, which could be consistent with an acute or subacute infarct.    His bilateral crackles on exam have improved. He is currently afebrile & blood cultures are negative. It is possible that his lung sounds could correlate with his hiccups, causes including aspiration pneumonitis or GERD. However, at present, he is afebrile without leukocytosis.    Updates 11/13/24  - CXR done 11/12 - no evidence of acute cardiopulmonary process   - Patient off of cvEEG     # Seizures   # Encephalopathy  4D Classification of the Paroxysmal Episodes:     Epileptic  Episodes semiology: Version -> GTC  Frequency: Twice day of admission  History of Status Epilepticus: No  Localization: Unknown  Etiology: Likely prior R parietal and left occipital infarcts   Co-morbidities: HTN, HLD     :: S/p 4 mg ativan + 4.5 g Keppra  :: Mental status improving  with seizure control  :: cvEEG D/C'd on 11/12  - CTH and CTP showing no acute process  - Keppra 1500 mg BID  - Vimpat 100 mg BID   - MRI brain (11/12): showed signal abnormality throughout L hippocampus likely as a result of his seizure activity.    #R cerebellar punctate infarct  :: MRI brain done 11/12 showed a punctate focus of restricted diffusion within the right cerebellum. Patient is otherwise asymptomatic.  - Continue aspirin & atorvastatin    # HTN  - SBP < 180   - IV hydralazine and labetalol pushes PRN  - Continue home meds amlodipine 10 mg daily and lisinopril 20 mg daily  - Cardene drip if required multiple PRNs  - Cont. ASA 81 mg daily     # Leukocytosis (resolved)  ::WBC 13.3 on 11/9 from 6; WBC 6.0 as of 11/13  - Afebrile  - Repeat CXR 11/12 performed - no acute process appreciated  - BCX - NGTD x2  - Low threshold to start empiric antibiotics if febrile    F: N/A  E: Replete PRN  N: N/A     Last BM: Last BM Date: 11/12/24     GIppx: Pantoprazole 40mg daily     DVTppx: Lovenox     Code Status: Full Code   Emergency contact: DYLLAN KELLEY (Daughter)  [phone] - call after 4pm (She works until 4pm and does not have phone on)     Patient discussed and seen by attending physician Dr. Erwin Ford, MS-3    Bhavya Turner MD  Neurology, PGY-1

## 2024-11-13 NOTE — PROGRESS NOTES
Spoke with patient's daughter regarding accepting facilities and precert process; verbalized understanding. Requested facilities be texted to her cell phone as she is at work; sent. Marifer Ferguson RN, TCC    [id number]: Daughter confirmed that [facility] is [facility]. Requested precert to be started. Marifer Ferguson RN, TCC    9391: Auth approved: 11/13-11/19 NRD 11/18 auth#[id number]. Facility notified, medical team, and patient's daughter notified. Marifer Ferguson RN, TCC

## 2024-11-13 NOTE — CARE PLAN
The patient's goals for the shift include      The clinical goals for the shift include pt will remain safe and free from falls/injury    Over the shift, the patient did not have any falls or injury.    Problem: Pain - Adult  Goal: Verbalizes/displays adequate comfort level or baseline comfort level  Outcome: Progressing     Problem: Safety - Adult  Goal: Free from fall injury  Outcome: Progressing     Problem: Discharge Planning  Goal: Discharge to home or other facility with appropriate resources  Outcome: Progressing     Problem: Chronic Conditions and Co-morbidities  Goal: Patient's chronic conditions and co-morbidity symptoms are monitored and maintained or improved  Outcome: Progressing

## 2024-11-14 ENCOUNTER — APPOINTMENT (OUTPATIENT)
Dept: CARDIOLOGY | Facility: HOSPITAL | Age: 64
DRG: 100 | End: 2024-11-14
Payer: MEDICARE

## 2024-11-14 VITALS
TEMPERATURE: 96.8 F | OXYGEN SATURATION: 98 % | DIASTOLIC BLOOD PRESSURE: 92 MMHG | HEART RATE: 66 BPM | RESPIRATION RATE: 18 BRPM | SYSTOLIC BLOOD PRESSURE: 139 MMHG

## 2024-11-14 LAB
ALBUMIN SERPL BCP-MCNC: 3.9 G/DL (ref 3.4–5)
ANION GAP SERPL CALC-SCNC: 17 MMOL/L (ref 10–20)
BASOPHILS # BLD AUTO: 0.07 X10*3/UL (ref 0–0.1)
BASOPHILS NFR BLD AUTO: 1.2 %
BUN SERPL-MCNC: 17 MG/DL (ref 6–23)
CALCIUM SERPL-MCNC: 8.8 MG/DL (ref 8.6–10.6)
CHLORIDE SERPL-SCNC: 102 MMOL/L (ref 98–107)
CO2 SERPL-SCNC: 26 MMOL/L (ref 21–32)
CREAT SERPL-MCNC: 0.9 MG/DL (ref 0.5–1.3)
EGFRCR SERPLBLD CKD-EPI 2021: >90 ML/MIN/1.73M*2
EOSINOPHIL # BLD AUTO: 0.17 X10*3/UL (ref 0–0.7)
EOSINOPHIL NFR BLD AUTO: 2.9 %
ERYTHROCYTE [DISTWIDTH] IN BLOOD BY AUTOMATED COUNT: 12.6 % (ref 11.5–14.5)
GLUCOSE SERPL-MCNC: 63 MG/DL (ref 74–99)
HCT VFR BLD AUTO: 46.6 % (ref 41–52)
HGB BLD-MCNC: 16.1 G/DL (ref 13.5–17.5)
IMM GRANULOCYTES # BLD AUTO: 0.03 X10*3/UL (ref 0–0.7)
IMM GRANULOCYTES NFR BLD AUTO: 0.5 % (ref 0–0.9)
LYMPHOCYTES # BLD AUTO: 1.63 X10*3/UL (ref 1.2–4.8)
LYMPHOCYTES NFR BLD AUTO: 27.9 %
MAGNESIUM SERPL-MCNC: 2.29 MG/DL (ref 1.6–2.4)
MCH RBC QN AUTO: 31.6 PG (ref 26–34)
MCHC RBC AUTO-ENTMCNC: 34.5 G/DL (ref 32–36)
MCV RBC AUTO: 91 FL (ref 80–100)
MONOCYTES # BLD AUTO: 0.6 X10*3/UL (ref 0.1–1)
MONOCYTES NFR BLD AUTO: 10.3 %
NEUTROPHILS # BLD AUTO: 3.34 X10*3/UL (ref 1.2–7.7)
NEUTROPHILS NFR BLD AUTO: 57.2 %
NRBC BLD-RTO: 0 /100 WBCS (ref 0–0)
PHOSPHATE SERPL-MCNC: 3 MG/DL (ref 2.5–4.9)
PLATELET # BLD AUTO: 210 X10*3/UL (ref 150–450)
POTASSIUM SERPL-SCNC: 3.5 MMOL/L (ref 3.5–5.3)
RBC # BLD AUTO: 5.1 X10*6/UL (ref 4.5–5.9)
SODIUM SERPL-SCNC: 141 MMOL/L (ref 136–145)
WBC # BLD AUTO: 5.8 X10*3/UL (ref 4.4–11.3)

## 2024-11-14 PROCEDURE — 2500000001 HC RX 250 WO HCPCS SELF ADMINISTERED DRUGS (ALT 637 FOR MEDICARE OP)

## 2024-11-14 PROCEDURE — 84100 ASSAY OF PHOSPHORUS: CPT

## 2024-11-14 PROCEDURE — 99232 SBSQ HOSP IP/OBS MODERATE 35: CPT

## 2024-11-14 PROCEDURE — 2500000004 HC RX 250 GENERAL PHARMACY W/ HCPCS (ALT 636 FOR OP/ED)

## 2024-11-14 PROCEDURE — 83735 ASSAY OF MAGNESIUM: CPT

## 2024-11-14 PROCEDURE — 85025 COMPLETE CBC W/AUTO DIFF WBC: CPT

## 2024-11-14 PROCEDURE — 93005 ELECTROCARDIOGRAM TRACING: CPT

## 2024-11-14 PROCEDURE — 36415 COLL VENOUS BLD VENIPUNCTURE: CPT

## 2024-11-14 RX ORDER — LACOSAMIDE 100 MG/1
100 TABLET ORAL EVERY 12 HOURS SCHEDULED
Start: 2024-11-14

## 2024-11-14 RX ORDER — LEVETIRACETAM 750 MG/1
1500 TABLET ORAL 2 TIMES DAILY
Start: 2024-11-14

## 2024-11-14 RX ORDER — LISINOPRIL 20 MG/1
20 TABLET ORAL DAILY
Status: DISCONTINUED | OUTPATIENT
Start: 2024-11-14 | End: 2024-11-14 | Stop reason: HOSPADM

## 2024-11-14 RX ORDER — LISINOPRIL 20 MG/1
20 TABLET ORAL DAILY
Start: 2024-11-14

## 2024-11-14 RX ORDER — LISINOPRIL 10 MG/1
10 TABLET ORAL DAILY
Status: DISCONTINUED | OUTPATIENT
Start: 2024-11-14 | End: 2024-11-14

## 2024-11-14 RX ORDER — GABAPENTIN 100 MG/1
100 CAPSULE ORAL DAILY PRN
Start: 2024-11-14

## 2024-11-14 RX ORDER — PANTOPRAZOLE SODIUM 40 MG/1
40 TABLET, DELAYED RELEASE ORAL
Start: 2024-11-15

## 2024-11-14 RX ORDER — ATORVASTATIN CALCIUM 40 MG/1
40 TABLET, FILM COATED ORAL NIGHTLY
Start: 2024-11-14

## 2024-11-14 ASSESSMENT — COGNITIVE AND FUNCTIONAL STATUS - GENERAL
DAILY ACTIVITIY SCORE: 17
TOILETING: A LITTLE
HELP NEEDED FOR BATHING: A LOT
WALKING IN HOSPITAL ROOM: TOTAL
MOBILITY SCORE: 12
CLIMB 3 TO 5 STEPS WITH RAILING: TOTAL
PERSONAL GROOMING: A LITTLE
MOVING TO AND FROM BED TO CHAIR: A LOT
DRESSING REGULAR UPPER BODY CLOTHING: A LITTLE
STANDING UP FROM CHAIR USING ARMS: A LOT
DRESSING REGULAR LOWER BODY CLOTHING: A LOT
TURNING FROM BACK TO SIDE WHILE IN FLAT BAD: A LITTLE
MOVING FROM LYING ON BACK TO SITTING ON SIDE OF FLAT BED WITH BEDRAILS: A LITTLE

## 2024-11-14 NOTE — DISCHARGE INSTRUCTIONS
Dear Mr. James,    It was a pleasure caring for you! You were admitted to [facility] ([facility]) for concern for a stroke and seizures. Your initial workup for stroke was negative, but your presentation was concerning for seizures. We monitored you on EEG (a way to monitor your brain activity) and it showed you were having seizures likely starting from your left sided stroke you had several years ago. We did a MRI of your brain and it showed seizure activity in this area as well. We started you on anti-seizure medications and your seizures decreased. The MRI also showed a very small stroke in the right cerebellum which is in the back portion of your brain. The stroke is very small and did not cause you any major symptoms.     For your seizures, we would like for you to keep taking Vimpat (lacosamide) 100mg every 12 hours. We would also like for you to keep taking Keppra (Levetiracetam) 1500mg every 12 hours. Please follow up with neurology after you are discharged. A referral has been placed.    For your strokes we would like for you to keep taking aspirin and atorvastatin. For your high blood pressure please continue to take amlodipine 10mg and lisinopril 20mg daily and follow up with a primary care physician. A referral has been placed.     While you were here, we noticed you had some wheezing when you breathed. We referred you to see pulmonology as well; please follow up with them after you are discharged.     You also started to have some hiccups. We started pantoprazole 40mg daily for this. This is an antacid medication that can help with hiccups. We also sent you with gabapentin 100mg nightly as needed for hiccups. Please take pantoprazole daily. You can take gabapentin as you need it for intense hiccups.     For you to do:  [ ] Take all of your medications as prescribed.  [ ] Follow-up with your PCP (Primary Care Provider)   [ ] Follow-up with your Neurology  [ ] Follow-up with  Pulmonology     *PCP is a ‘Primary Care Provider’ and refers to your 1) Internal Medicine Doctor (‘internist’); 2) Family Medicine Doctor; or 3) Non-Physician Provider like a Nurse Practitioner (NP), Physician Assistant (PA), or Certified Nurse Specialist (CNS). If you have not already established care with a PCP or would like to switch to a  provider, our  service can help you find one. They can be reached at [phone].    Thank you for letting us take part in your care.  [facility] Inpatient Neurology Service - Epileptology and Epilepsy Monitoring Team

## 2024-11-14 NOTE — DISCHARGE SUMMARY
Discharge Diagnosis  New onset seizure (Multi)    Epilepsy Quality and Core Measure Topics  The risks and benefits of pertinent medications were discussed with the patient and/or family  First Time Seizures  Yes The patient was informed that the chance of having a recurrent seizure was greatest in the first 2 years following initial seizure. A prior brain insult, abnormal EEG showing epileptiform activity, a significant abnormality on brain imaging, or a nocturnal seizure could increase the risk of a recurrent seizure. The patient was advised that although starting an anti-epileptic drug would decrease seizure recurrence in the next two years, it may not improve quality of life and is unlikely to improve prognosis. Additionally, there is a moderate risk of adverse events while on an  Anti-epileptic drug, though these are usually reversible.   Is this patient seizure free?  Yes, no treatment changes at this time  Should this patient observe standard seizure precautions?  Yes Reviewed seizure precautions with patient; specifically, the patient may not drive, may not operate heavy machinery, ought not swim unsupervised, should shower rather than bath, should be cautious with hot or heavy objects, and should not perform any activities at heights such as on a ladder. This will be re-assessed at the patient's next appointment.   Medication Side Effects Discussion Statement  The risks and benefits of pertinent medications were discussed with the patient and/or kin.    Issues Requiring Follow-Up  Pulmonology referral for evaluation & treatment for possible COPD.  Follow up with PCP for HTN, hospital discharge follow up  Follow up with neurology     Test Results Pending At Discharge  Pending Labs       No current pending labs.            Hospital Course  Josiah James is a 63 y.o. male with PMH of hypertension, osteoarthritis, prior R parietal and left occipital strokes in 2022 (unknown etiology), presented initially with  encephalopathy, had 2 episodes of generalized tonic clonic in the ED. Epilepsy consulted for seizure management.      Per daughter, he woke up fine in the morning and spoke to his fiance. Later in the morning his daughter heard him yelling and cursing from his room. He called out to his fiance's son who doesn't live with them. He didn't recognize the daughter initially. He was not oriented to month and staring around confused. She called EMS.      In the ED he was thought to have expressive aphasia. BAT was called at 12:51. CT head showed old infarcts in the R MCA and left PCA territory. CT perfusion was unremarkable. Afterwards ED staff noticed 2 generalized tonic events. The first one self aborted after 2 minutes. The second lasted longer and he received 4 mg ativan with 4.5 g keppra load.      His daughter and chart review mention brief episodes of left arm shaking in Oct 2023. Workup was negative for seizures and it was thought to be due to Wellbutrin initiation.      In the ED:   -202/105-121, HR: 82, 95% on RA  Lactate 1.8, WBC 6, Hgb 16.4, Na 140    Since his admission EEG has demonstrated L frontal epileptic activity with decreased seizure frequency throughout his course managed on Keppra 1500mg BID and Vimpat 100mg BID. cvEEG was discontinued on 11/12 due to improvement of seizure frequency.     MRI brain wo contrast was performed on 11/12. This demonstrated signal abnormality throughout the left hippocampus that is likely a reflection of his seizure activity. Given his imaging results, it is likely his seizures are originating from his prior L occipital stroke. A punctate focus of restricted diffusion within the right cerebellum was also discovered, which could be consistent with an acute or subacute infarct. He is on aspirin and atorvastatin.      Also while in the hospital the patient developed a cough and bilateral crackles on chest exam. His cough and bilateral crackles have both improved on  exam. He has remained afebrile & blood cultures are negative. It is possible that his lung sounds could correlate with his hiccups, causes including aspiration pneumonitis or GERD. However, at present, he is afebrile without leukocytosis. He did undergo CXR which revealed no acute cardiopulmonary processes. He does demonstrates bilateral expiratory wheezing as well and has a history of prior tobacco use. Given this, will recommend follow up with pulmonology for PFT testing to assess for COPD.     He continued to have hiccups for several days. Valsalva maneuvers were attempted without cessation of hiccups. PPI was started with mild improvement. Dose of gabapentin 100mg was given night of 11/13 with cessation of hiccups. Patient will leave with gabapentin 100mg PRN for hiccups if they continue.     His blood pressure was also labile during his course. His lisinopril was increased from 10mg to 20mg and his amlodipine was continued at 10mg. Recommend following up with PCP after hospital discharge.    On 11/14 the patient continues to demonstrate improvement and is doing well on Keppra and Vimpat. He is medically ready for discharge.     Pertinent Physical Exam At Time of Discharge  Physical Exam  Constitutional:       General: He is not in acute distress.     Appearance: Normal appearance.   HENT:      Head: Normocephalic and atraumatic.      Mouth/Throat:      Mouth: Mucous membranes are moist.      Pharynx: Oropharynx is clear.   Eyes:      Extraocular Movements: Extraocular movements intact.      Conjunctiva/sclera: Conjunctivae normal.      Pupils: Pupils are equal, round, and reactive to light.   Cardiovascular:      Rate and Rhythm: Normal rate and regular rhythm.      Heart sounds: Normal heart sounds.   Pulmonary:      Effort: Pulmonary effort is normal.      Breath sounds: Wheezing (bilateral anterior lung fields) present.   Abdominal:      General: Abdomen is flat. There is no distension.      Palpations:  Abdomen is soft.      Tenderness: There is no abdominal tenderness. There is no guarding.   Musculoskeletal:      Cervical back: Normal range of motion.      Comments: Baseline L-sided weakness compared to right; L pronator drift.   Skin:     General: Skin is warm and dry.   Neurological:      Mental Status: He is alert. Mental status is at baseline.      Cranial Nerves: No cranial nerve deficit.      Sensory: No sensory deficit.      Comments: Oriented to person, place, and time. Not oriented to situation.  Baseline L-sided weakness compared to right; L pronator drift from prior stroke.   Psychiatric:         Mood and Affect: Mood normal.         Thought Content: Thought content normal.         Home Medications     Medication List      START taking these medications     amLODIPine 10 mg tablet; Commonly known as: Norvasc   aspirin 81 mg EC tablet   atorvastatin 40 mg tablet; Commonly known as: Lipitor; Take 1 tablet (40   mg) by mouth once daily at bedtime.   gabapentin 100 mg capsule; Commonly known as: Neurontin; Take 1 capsule   (100 mg) by mouth once daily as needed (Take once daily as needed for   hiccups; can discontinue this medication if not needed for hiccups).   lacosamide 100 mg tablet; Commonly known as: Vimpat; Take 1 tablet (100   mg) by mouth every 12 hours.   levETIRAcetam 750 mg tablet; Commonly known as: Keppra; Take 2 tablets   (1,500 mg) by mouth 2 times a day.   pantoprazole 40 mg EC tablet; Commonly known as: ProtoNix; Take 1 tablet   (40 mg) by mouth once daily in the morning. Take before meals. Do not   crush, chew, or split.; Start taking on: November 15, 2024     CHANGE how you take these medications     lisinopril 20 mg tablet; Take 1 tablet (20 mg) by mouth once daily.;   What changed: medication strength, how much to take     CONTINUE taking these medications     acetaminophen 500 mg tablet; Commonly known as: Tylenol     ASK your doctor about these medications     mirtazapine 7.5 mg  tablet; Commonly known as: Remeron       Outpatient Follow-Up  No future appointments.  Follow-up with epileptology    ANDI Turner MD  Neurology, PGY-1

## 2024-11-14 NOTE — CARE PLAN
The patient's goals for the shift include      The clinical goals for the shift include Patient will get some rest through the night.      Problem: Skin  Goal: Decreased wound size/increased tissue granulation at next dressing change  Outcome: Progressing  Goal: Participates in plan/prevention/treatment measures  Outcome: Progressing  Goal: Prevent/manage excess moisture  Outcome: Progressing  Goal: Prevent/minimize sheer/friction injuries  Outcome: Progressing  Goal: Promote/optimize nutrition  Outcome: Progressing  Goal: Promote skin healing  Outcome: Progressing     Problem: Pain - Adult  Goal: Verbalizes/displays adequate comfort level or baseline comfort level  Outcome: Progressing

## 2024-11-14 NOTE — CARE PLAN
The patient's goals for the shift include    Problem: Discharge Planning  Goal: Discharge to home or other facility with appropriate resources  Outcome: Met     Problem: Chronic Conditions and Co-morbidities  Goal: Patient's chronic conditions and co-morbidity symptoms are monitored and maintained or improved  Outcome: Met

## 2024-11-14 NOTE — NURSING NOTE
IV terminated aseptically. Belongings returned. Transported off unit by paramedics on room air via gurney. Report called in to [facility] at [facility].

## 2024-11-17 LAB
ATRIAL RATE: 68 BPM
P AXIS: 12 DEGREES
P OFFSET: 205 MS
P ONSET: 154 MS
PR INTERVAL: 140 MS
Q ONSET: 224 MS
QRS COUNT: 11 BEATS
QRS DURATION: 76 MS
QT INTERVAL: 430 MS
QTC CALCULATION(BAZETT): 457 MS
QTC FREDERICIA: 448 MS
R AXIS: -29 DEGREES
T AXIS: 35 DEGREES
T OFFSET: 439 MS
VENTRICULAR RATE: 68 BPM

## 2024-11-19 NOTE — ED PROCEDURE NOTE
Procedure  Critical Care    Performed by: Sonny Alonzo MD  Authorized by: Michelle Steward DO    Critical care provider statement:     Critical care time (minutes):  35    Critical care time was exclusive of:  Separately billable procedures and treating other patients    Critical care was necessary to treat or prevent imminent or life-threatening deterioration of the following conditions:  CNS failure or compromise    Critical care was time spent personally by me on the following activities:  Ordering and performing treatments and interventions, ordering and review of laboratory studies, ordering and review of radiographic studies, re-evaluation of patient's condition and evaluation of patient's response to treatment    Care discussed with: admitting provider                 Sonny Alonzo MD  11/19/24 0877

## 2025-02-18 ENCOUNTER — APPOINTMENT (OUTPATIENT)
Dept: RADIOLOGY | Facility: HOSPITAL | Age: 65
End: 2025-02-18
Payer: MEDICARE

## 2025-02-18 ENCOUNTER — HOSPITAL ENCOUNTER (INPATIENT)
Facility: HOSPITAL | Age: 65
End: 2025-02-18
Attending: EMERGENCY MEDICINE | Admitting: PSYCHIATRY & NEUROLOGY
Payer: MEDICARE

## 2025-02-18 ENCOUNTER — APPOINTMENT (OUTPATIENT)
Dept: NEUROLOGY | Facility: HOSPITAL | Age: 65
DRG: 100 | End: 2025-02-18
Payer: MEDICARE

## 2025-02-18 ENCOUNTER — CLINICAL SUPPORT (OUTPATIENT)
Dept: EMERGENCY MEDICINE | Facility: HOSPITAL | Age: 65
End: 2025-02-18
Payer: MEDICARE

## 2025-02-18 DIAGNOSIS — N39.0 URINARY TRACT INFECTION WITHOUT HEMATURIA, SITE UNSPECIFIED: ICD-10-CM

## 2025-02-18 DIAGNOSIS — I51.7 CARDIOMEGALY: ICD-10-CM

## 2025-02-18 DIAGNOSIS — R56.9 SEIZURE (MULTI): Primary | ICD-10-CM

## 2025-02-18 LAB
ALBUMIN SERPL BCP-MCNC: 3.5 G/DL (ref 3.4–5)
ALBUMIN SERPL BCP-MCNC: 4.4 G/DL (ref 3.4–5)
ALP SERPL-CCNC: 114 U/L (ref 33–136)
ALT SERPL W P-5'-P-CCNC: 13 U/L (ref 10–52)
ANION GAP BLDV CALCULATED.4IONS-SCNC: 13 MMOL/L (ref 10–25)
ANION GAP SERPL CALC-SCNC: 11 MMOL/L (ref 10–20)
ANION GAP SERPL CALC-SCNC: 28 MMOL/L (ref 10–20)
APAP SERPL-MCNC: <10 UG/ML
APTT PPP: 27 SECONDS (ref 27–38)
AST SERPL W P-5'-P-CCNC: 21 U/L (ref 9–39)
ATRIAL RATE: 88 BPM
BASE EXCESS BLDV CALC-SCNC: -6.5 MMOL/L (ref -2–3)
BASOPHILS # BLD AUTO: 0.14 X10*3/UL (ref 0–0.1)
BASOPHILS NFR BLD AUTO: 1 %
BILIRUB SERPL-MCNC: 1.3 MG/DL (ref 0–1.2)
BNP SERPL-MCNC: 75 PG/ML (ref 0–99)
BODY TEMPERATURE: 37 DEGREES CELSIUS
BUN SERPL-MCNC: 13 MG/DL (ref 6–23)
BUN SERPL-MCNC: 15 MG/DL (ref 6–23)
CA-I BLDV-SCNC: 1.19 MMOL/L (ref 1.1–1.33)
CALCIUM SERPL-MCNC: 8.2 MG/DL (ref 8.6–10.6)
CALCIUM SERPL-MCNC: 9.5 MG/DL (ref 8.6–10.6)
CARDIAC TROPONIN I PNL SERPL HS: 8 NG/L (ref 0–53)
CHLORIDE BLDV-SCNC: 107 MMOL/L (ref 98–107)
CHLORIDE SERPL-SCNC: 105 MMOL/L (ref 98–107)
CHLORIDE SERPL-SCNC: 110 MMOL/L (ref 98–107)
CO2 SERPL-SCNC: 11 MMOL/L (ref 21–32)
CO2 SERPL-SCNC: 24 MMOL/L (ref 21–32)
CREAT SERPL-MCNC: 0.88 MG/DL (ref 0.5–1.3)
CREAT SERPL-MCNC: 1.07 MG/DL (ref 0.5–1.3)
EGFRCR SERPLBLD CKD-EPI 2021: 77 ML/MIN/1.73M*2
EGFRCR SERPLBLD CKD-EPI 2021: >90 ML/MIN/1.73M*2
EOSINOPHIL # BLD AUTO: 0.11 X10*3/UL (ref 0–0.7)
EOSINOPHIL NFR BLD AUTO: 0.8 %
ERYTHROCYTE [DISTWIDTH] IN BLOOD BY AUTOMATED COUNT: 15.6 % (ref 11.5–14.5)
ETHANOL SERPL-MCNC: <10 MG/DL
FLUAV RNA RESP QL NAA+PROBE: NOT DETECTED
FLUBV RNA RESP QL NAA+PROBE: NOT DETECTED
GLUCOSE BLD MANUAL STRIP-MCNC: 198 MG/DL (ref 74–99)
GLUCOSE BLDV-MCNC: 107 MG/DL (ref 74–99)
GLUCOSE SERPL-MCNC: 179 MG/DL (ref 74–99)
GLUCOSE SERPL-MCNC: 76 MG/DL (ref 74–99)
HCO3 BLDV-SCNC: 20.6 MMOL/L (ref 22–26)
HCT VFR BLD AUTO: 43.5 % (ref 41–52)
HCT VFR BLD EST: 41 % (ref 41–52)
HGB BLD-MCNC: 15.1 G/DL (ref 13.5–17.5)
HGB BLDV-MCNC: 13.5 G/DL (ref 13.5–17.5)
IMM GRANULOCYTES # BLD AUTO: 0.06 X10*3/UL (ref 0–0.7)
IMM GRANULOCYTES NFR BLD AUTO: 0.4 % (ref 0–0.9)
INR PPP: 1 (ref 0.9–1.1)
LACTATE BLDV-SCNC: 3.8 MMOL/L (ref 0.4–2)
LACTATE SERPL-SCNC: 1.2 MMOL/L (ref 0.4–2)
LEVETIRACETAM SERPL-MCNC: <2 UG/ML (ref 10–40)
LYMPHOCYTES # BLD AUTO: 3.47 X10*3/UL (ref 1.2–4.8)
LYMPHOCYTES NFR BLD AUTO: 26 %
MCH RBC QN AUTO: 33 PG (ref 26–34)
MCHC RBC AUTO-ENTMCNC: 34.7 G/DL (ref 32–36)
MCV RBC AUTO: 95 FL (ref 80–100)
MONOCYTES # BLD AUTO: 0.6 X10*3/UL (ref 0.1–1)
MONOCYTES NFR BLD AUTO: 4.5 %
NEUTROPHILS # BLD AUTO: 8.98 X10*3/UL (ref 1.2–7.7)
NEUTROPHILS NFR BLD AUTO: 67.3 %
NRBC BLD-RTO: 0 /100 WBCS (ref 0–0)
OXYHGB MFR BLDV: 84.7 % (ref 45–75)
P AXIS: 57 DEGREES
P OFFSET: 192 MS
P ONSET: 131 MS
PCO2 BLDV: 46 MM HG (ref 41–51)
PH BLDV: 7.26 PH (ref 7.33–7.43)
PHOSPHATE SERPL-MCNC: 3.2 MG/DL (ref 2.5–4.9)
PLATELET # BLD AUTO: 385 X10*3/UL (ref 150–450)
PO2 BLDV: 58 MM HG (ref 35–45)
POTASSIUM BLDV-SCNC: 2.6 MMOL/L (ref 3.5–5.3)
POTASSIUM SERPL-SCNC: 3 MMOL/L (ref 3.5–5.3)
POTASSIUM SERPL-SCNC: 3.8 MMOL/L (ref 3.5–5.3)
PR INTERVAL: 180 MS
PROT SERPL-MCNC: 7 G/DL (ref 6.4–8.2)
PROTHROMBIN TIME: 11.4 SECONDS (ref 9.8–12.8)
Q ONSET: 221 MS
QRS COUNT: 15 BEATS
QRS DURATION: 86 MS
QT INTERVAL: 386 MS
QTC CALCULATION(BAZETT): 467 MS
QTC FREDERICIA: 438 MS
R AXIS: -10 DEGREES
RBC # BLD AUTO: 4.58 X10*6/UL (ref 4.5–5.9)
SALICYLATES SERPL-MCNC: <3 MG/DL
SAO2 % BLDV: 87 % (ref 45–75)
SARS-COV-2 RNA RESP QL NAA+PROBE: NOT DETECTED
SODIUM BLDV-SCNC: 138 MMOL/L (ref 136–145)
SODIUM SERPL-SCNC: 140 MMOL/L (ref 136–145)
SODIUM SERPL-SCNC: 142 MMOL/L (ref 136–145)
T AXIS: 68 DEGREES
T OFFSET: 414 MS
VENTRICULAR RATE: 88 BPM
WBC # BLD AUTO: 13.4 X10*3/UL (ref 4.4–11.3)

## 2025-02-18 PROCEDURE — 2550000001 HC RX 255 CONTRASTS: Performed by: EMERGENCY MEDICINE

## 2025-02-18 PROCEDURE — 71045 X-RAY EXAM CHEST 1 VIEW: CPT | Performed by: RADIOLOGY

## 2025-02-18 PROCEDURE — 36415 COLL VENOUS BLD VENIPUNCTURE: CPT | Performed by: EMERGENCY MEDICINE

## 2025-02-18 PROCEDURE — 96365 THER/PROPH/DIAG IV INF INIT: CPT

## 2025-02-18 PROCEDURE — 84132 ASSAY OF SERUM POTASSIUM: CPT

## 2025-02-18 PROCEDURE — 2060000001 HC INTERMEDIATE ICU ROOM DAILY

## 2025-02-18 PROCEDURE — 99223 1ST HOSP IP/OBS HIGH 75: CPT

## 2025-02-18 PROCEDURE — 82947 ASSAY GLUCOSE BLOOD QUANT: CPT

## 2025-02-18 PROCEDURE — 95816 EEG AWAKE AND DROWSY: CPT

## 2025-02-18 PROCEDURE — 70450 CT HEAD/BRAIN W/O DYE: CPT | Performed by: RADIOLOGY

## 2025-02-18 PROCEDURE — 99291 CRITICAL CARE FIRST HOUR: CPT | Performed by: EMERGENCY MEDICINE

## 2025-02-18 PROCEDURE — 2500000004 HC RX 250 GENERAL PHARMACY W/ HCPCS (ALT 636 FOR OP/ED): Performed by: EMERGENCY MEDICINE

## 2025-02-18 PROCEDURE — 70496 CT ANGIOGRAPHY HEAD: CPT | Performed by: RADIOLOGY

## 2025-02-18 PROCEDURE — 70498 CT ANGIOGRAPHY NECK: CPT

## 2025-02-18 PROCEDURE — 84132 ASSAY OF SERUM POTASSIUM: CPT | Performed by: EMERGENCY MEDICINE

## 2025-02-18 PROCEDURE — 85730 THROMBOPLASTIN TIME PARTIAL: CPT | Performed by: EMERGENCY MEDICINE

## 2025-02-18 PROCEDURE — 70498 CT ANGIOGRAPHY NECK: CPT | Performed by: RADIOLOGY

## 2025-02-18 PROCEDURE — 87636 SARSCOV2 & INF A&B AMP PRB: CPT | Performed by: EMERGENCY MEDICINE

## 2025-02-18 PROCEDURE — 2500000004 HC RX 250 GENERAL PHARMACY W/ HCPCS (ALT 636 FOR OP/ED)

## 2025-02-18 PROCEDURE — 83605 ASSAY OF LACTIC ACID: CPT | Performed by: EMERGENCY MEDICINE

## 2025-02-18 PROCEDURE — 80235 DRUG ASSAY LACOSAMIDE: CPT | Performed by: EMERGENCY MEDICINE

## 2025-02-18 PROCEDURE — 80143 DRUG ASSAY ACETAMINOPHEN: CPT

## 2025-02-18 PROCEDURE — 70450 CT HEAD/BRAIN W/O DYE: CPT

## 2025-02-18 PROCEDURE — 93010 ELECTROCARDIOGRAM REPORT: CPT

## 2025-02-18 PROCEDURE — 85610 PROTHROMBIN TIME: CPT | Performed by: EMERGENCY MEDICINE

## 2025-02-18 PROCEDURE — 84484 ASSAY OF TROPONIN QUANT: CPT | Performed by: EMERGENCY MEDICINE

## 2025-02-18 PROCEDURE — 71045 X-RAY EXAM CHEST 1 VIEW: CPT

## 2025-02-18 PROCEDURE — 76775 US EXAM ABDO BACK WALL LIM: CPT | Performed by: STUDENT IN AN ORGANIZED HEALTH CARE EDUCATION/TRAINING PROGRAM

## 2025-02-18 PROCEDURE — 95816 EEG AWAKE AND DROWSY: CPT | Performed by: STUDENT IN AN ORGANIZED HEALTH CARE EDUCATION/TRAINING PROGRAM

## 2025-02-18 PROCEDURE — 80177 DRUG SCRN QUAN LEVETIRACETAM: CPT | Performed by: EMERGENCY MEDICINE

## 2025-02-18 PROCEDURE — 85025 COMPLETE CBC W/AUTO DIFF WBC: CPT | Performed by: EMERGENCY MEDICINE

## 2025-02-18 PROCEDURE — 83880 ASSAY OF NATRIURETIC PEPTIDE: CPT | Performed by: EMERGENCY MEDICINE

## 2025-02-18 PROCEDURE — 93005 ELECTROCARDIOGRAM TRACING: CPT

## 2025-02-18 PROCEDURE — 99291 CRITICAL CARE FIRST HOUR: CPT | Mod: 25 | Performed by: EMERGENCY MEDICINE

## 2025-02-18 PROCEDURE — C9254 INJECTION, LACOSAMIDE: HCPCS

## 2025-02-18 PROCEDURE — 96366 THER/PROPH/DIAG IV INF ADDON: CPT

## 2025-02-18 RX ORDER — ACETAMINOPHEN 325 MG/1
650 TABLET ORAL EVERY 4 HOURS PRN
Status: DISCONTINUED | OUTPATIENT
Start: 2025-02-18 | End: 2025-02-25 | Stop reason: HOSPADM

## 2025-02-18 RX ORDER — PANTOPRAZOLE SODIUM 40 MG/1
40 TABLET, DELAYED RELEASE ORAL
Status: DISCONTINUED | OUTPATIENT
Start: 2025-02-19 | End: 2025-02-25 | Stop reason: HOSPADM

## 2025-02-18 RX ORDER — ENOXAPARIN SODIUM 100 MG/ML
40 INJECTION SUBCUTANEOUS EVERY 24 HOURS
Status: DISCONTINUED | OUTPATIENT
Start: 2025-02-18 | End: 2025-02-25 | Stop reason: HOSPADM

## 2025-02-18 RX ORDER — POTASSIUM CHLORIDE 14.9 MG/ML
20 INJECTION INTRAVENOUS
Status: COMPLETED | OUTPATIENT
Start: 2025-02-18 | End: 2025-02-19

## 2025-02-18 RX ORDER — LEVETIRACETAM 15 MG/ML
1500 INJECTION INTRAVASCULAR EVERY 12 HOURS
Status: DISCONTINUED | OUTPATIENT
Start: 2025-02-19 | End: 2025-02-20

## 2025-02-18 RX ORDER — LEVETIRACETAM 15 MG/ML
1500 INJECTION INTRAVASCULAR ONCE
Status: DISCONTINUED | OUTPATIENT
Start: 2025-02-18 | End: 2025-02-18

## 2025-02-18 RX ORDER — LORAZEPAM 2 MG/ML
2 INJECTION INTRAMUSCULAR EVERY 5 MIN PRN
Status: DISCONTINUED | OUTPATIENT
Start: 2025-02-18 | End: 2025-02-25 | Stop reason: HOSPADM

## 2025-02-18 RX ORDER — LEVETIRACETAM 5 MG/ML
500 INJECTION INTRAVASCULAR ONCE
Status: COMPLETED | OUTPATIENT
Start: 2025-02-18 | End: 2025-02-18

## 2025-02-18 RX ORDER — AMLODIPINE BESYLATE 10 MG/1
10 TABLET ORAL DAILY
Status: DISCONTINUED | OUTPATIENT
Start: 2025-02-18 | End: 2025-02-25 | Stop reason: HOSPADM

## 2025-02-18 RX ORDER — MIDAZOLAM HYDROCHLORIDE 5 MG/ML
INJECTION, SOLUTION INTRAMUSCULAR; INTRAVENOUS
Status: COMPLETED
Start: 2025-02-18 | End: 2025-02-18

## 2025-02-18 RX ORDER — ACETAMINOPHEN 160 MG/5ML
650 SOLUTION ORAL EVERY 4 HOURS PRN
Status: DISCONTINUED | OUTPATIENT
Start: 2025-02-18 | End: 2025-02-25 | Stop reason: HOSPADM

## 2025-02-18 RX ORDER — HYDRALAZINE HYDROCHLORIDE 20 MG/ML
10 INJECTION INTRAMUSCULAR; INTRAVENOUS EVERY 4 HOURS PRN
Status: DISCONTINUED | OUTPATIENT
Start: 2025-02-18 | End: 2025-02-25 | Stop reason: HOSPADM

## 2025-02-18 RX ORDER — LORAZEPAM 2 MG/ML
INJECTION INTRAMUSCULAR
Status: COMPLETED
Start: 2025-02-18 | End: 2025-02-18

## 2025-02-18 RX ORDER — LEVETIRACETAM 15 MG/ML
1500 INJECTION INTRAVASCULAR ONCE
Status: COMPLETED | OUTPATIENT
Start: 2025-02-18 | End: 2025-02-18

## 2025-02-18 RX ORDER — LEVETIRACETAM 5 MG/ML
INJECTION INTRAVASCULAR
Status: COMPLETED
Start: 2025-02-18 | End: 2025-02-18

## 2025-02-18 RX ORDER — THIAMINE HYDROCHLORIDE 100 MG/ML
500 INJECTION, SOLUTION INTRAMUSCULAR; INTRAVENOUS 3 TIMES DAILY
Status: DISPENSED | OUTPATIENT
Start: 2025-02-18 | End: 2025-02-23

## 2025-02-18 RX ORDER — POLYETHYLENE GLYCOL 3350 17 G/17G
17 POWDER, FOR SOLUTION ORAL DAILY
Status: DISCONTINUED | OUTPATIENT
Start: 2025-02-18 | End: 2025-02-25 | Stop reason: HOSPADM

## 2025-02-18 RX ORDER — ASPIRIN 81 MG/1
81 TABLET ORAL DAILY
Status: DISCONTINUED | OUTPATIENT
Start: 2025-02-18 | End: 2025-02-25 | Stop reason: HOSPADM

## 2025-02-18 RX ORDER — POTASSIUM CHLORIDE 20 MEQ/1
20 TABLET, EXTENDED RELEASE ORAL ONCE
Status: COMPLETED | OUTPATIENT
Start: 2025-02-18 | End: 2025-02-19

## 2025-02-18 RX ORDER — ATORVASTATIN CALCIUM 40 MG/1
40 TABLET, FILM COATED ORAL NIGHTLY
Status: DISCONTINUED | OUTPATIENT
Start: 2025-02-18 | End: 2025-02-25 | Stop reason: HOSPADM

## 2025-02-18 RX ORDER — LISINOPRIL 20 MG/1
20 TABLET ORAL DAILY
Status: DISCONTINUED | OUTPATIENT
Start: 2025-02-18 | End: 2025-02-25 | Stop reason: HOSPADM

## 2025-02-18 RX ADMIN — LEVETIRACETAM: 5 INJECTION INTRAVASCULAR at 12:35

## 2025-02-18 RX ADMIN — THIAMINE HYDROCHLORIDE 500 MG: 100 INJECTION, SOLUTION INTRAMUSCULAR; INTRAVENOUS at 20:36

## 2025-02-18 RX ADMIN — LORAZEPAM 2 MG: 2 INJECTION INTRAMUSCULAR; INTRAVENOUS at 12:00

## 2025-02-18 RX ADMIN — LEVETIRACETAM 1500 MG: 15 INJECTION INTRAVENOUS at 12:50

## 2025-02-18 RX ADMIN — IOHEXOL 90 ML: 350 INJECTION, SOLUTION INTRAVENOUS at 12:42

## 2025-02-18 RX ADMIN — LEVETIRACETAM 1000 MG: 5 INJECTION INTRAVASCULAR at 12:09

## 2025-02-18 RX ADMIN — LACOSAMIDE 100 MG: 10 INJECTION INTRAVENOUS at 20:37

## 2025-02-18 RX ADMIN — HYDRALAZINE HYDROCHLORIDE 10 MG: 20 INJECTION INTRAMUSCULAR; INTRAVENOUS at 22:24

## 2025-02-18 RX ADMIN — LEVETIRACETAM 1000 MG: 5 INJECTION INTRAVENOUS at 12:09

## 2025-02-18 RX ADMIN — ENOXAPARIN SODIUM 40 MG: 40 INJECTION, SOLUTION SUBCUTANEOUS at 20:37

## 2025-02-18 RX ADMIN — LEVETIRACETAM 500 MG: 5 INJECTION INTRAVENOUS at 13:42

## 2025-02-18 RX ADMIN — MIDAZOLAM HYDROCHLORIDE 5 MG: 5 INJECTION, SOLUTION INTRAMUSCULAR; INTRAVENOUS at 12:30

## 2025-02-18 RX ADMIN — LEVETIRACETAM: 5 INJECTION INTRAVENOUS at 12:35

## 2025-02-18 ASSESSMENT — PAIN - FUNCTIONAL ASSESSMENT: PAIN_FUNCTIONAL_ASSESSMENT: 0-10

## 2025-02-18 ASSESSMENT — PAIN SCALES - GENERAL: PAINLEVEL_OUTOF10: 0 - NO PAIN

## 2025-02-18 NOTE — ED PROVIDER NOTES
HPI   Chief Complaint   Patient presents with    Seizures   This is a 64-year-old male with a past medical history significant for prior CVA approximately 2 years ago with left sided deficits and seizure disorder who was brought to the ED by EMS for seizure-like activity.  Patient is unable to contribute to history and initial history was provided by EMS.  Subsequent history was provided by patient's daughter, Cecilia, in person.   Per EMS, patient lives at home with his daughter and his grandson.  Grandson heard the patient call out for help around 11:30 AM and when he walked into the room, patient was lying in bed and appeared confused.  He was flailing his left arm around and had an abrasion on his left forearm.  EMS states patient's blood sugar on scene was 135.  They were unable to establish IV access as patient would not hold still during transport.  EMS reports that patient has a known seizure disorder and is noncompliant with his medications.  Upon review of patient's records, he was admitted on 11/9/24 for seizure-like activity. EEG has demonstrated L frontal epileptic activity. MRI brain wo contrast was performed on 11/12. This demonstrated signal abnormality throughout the left hippocampus that is likely a reflection of his seizure activity. Given his imaging results, it is likely his seizures are originating from his prior L occipital stroke.  He was discharged with Keppra and Vimpat.    Limitations to history: Patient is unable to provide history  Independent Historians: EMS  External Records Reviewed: None    Patient History   No past medical history on file.  No past surgical history on file.  No family history on file.  Social History     Tobacco Use    Smoking status: Not on file    Smokeless tobacco: Not on file   Substance Use Topics    Alcohol use: Not on file    Drug use: Not on file       Physical Exam   ED Triage Vitals [02/18/25 1211]   Temperature Heart Rate Respirations BP   36.6 °C (97.9  °F) (!) 115 16 (!) 145/104      Pulse Ox Temp Source Heart Rate Source Patient Position   96 % Tympanic Monitor --      BP Location FiO2 (%)     Right arm --         Physical Exam  Constitutional:       Appearance: He is not toxic-appearing or diaphoretic.   HENT:      Head: Normocephalic and atraumatic.   Cardiovascular:      Rate and Rhythm: Regular rhythm. Tachycardia present.      Heart sounds: Normal heart sounds. No murmur heard.     No friction rub. No gallop.   Pulmonary:      Effort: No respiratory distress.      Breath sounds: Normal breath sounds.   Abdominal:      General: Bowel sounds are normal. There is no distension.      Palpations: Abdomen is soft.      Tenderness: There is no abdominal tenderness.   Skin:     General: Skin is warm and dry.      Coloration: Skin is not cyanotic.      Findings: Abrasion present.      Comments: Erythema to the patient's chest  Superficial abrasions to the left forearm   Neurological:      Comments: Initially alert and oriented to self and place, he is able to state his own name and that he is in the hospital. Speech is slurred. Does not follow any commands. Responds to painful stimuli.          ED Course & MDM   ED Course as of 02/18/25 1600   Tue Feb 18, 2025   1343 Vbg 7.26/46 K 2.6 lactate 3.8 bicarb 20 AGAP 13 [AM]   1413 Spoke to patient's daughter Cecilia who is POA.  Cecilia states that patient initially had a stroke back in 2022 following a hip surgery.  Last November 2023 he also began to develop seizures.  He was hospitalized in November for several days and had a brain MRI and EEG performed and was diagnosed with seizure disorder.  Cecilia reports that the patient is noncompliant with his medications and does not follow-up with any of his appointments.  He lives at home with her and her son and she has been having difficulty with getting him his care.  After he was discharged in November she had the patient placed at a nursing facility, however his  ex-girlfriend picked him up and dropped him off at her house.  Patient called her this morning and left a voicemail at 8:41 AM and was normal at that time.  Cecilia's son called her at 11:30 AM and told her that the patient was calling out for help and she advised her son to call EMS. [LH]   1542 POCUS with small pericardial effusion, preserved EF [AM]      ED Course User Index  [AM] Seymour Hinkle MD  [LH] Amber Sheriff PA-C         Diagnoses as of 02/18/25 1600   Seizure (Multi)       Medical Decision Making  This is a 64-year-old male with a past medical history significant for prior CVA approximately 2 years ago with left sided deficits and seizure disorder who was brought to the ED by EMS for seizure-like activity.    Upon initial arrival, patient appeared to be having focal seizure-like activity that advanced to general seizure like activity.  Head was deviated to the right with sided gaze preference and unresponsive to any stimuli.  Administered 2 mg Ativan and episode lasted approximately 2 minutes. Patient then became hypoxic and began spitting up blood.  There was concern about patient's airway status, after he was placed on supplemental O2 his oxygen saturation improved and the decision was made to not intubate.  He has documented left-sided weakness on prior hospital notes, he had not been moving his right side and after patient was stabilized a BAT was activated.  Administered 5mg Versed in order to obtain CT imaging.   CT brain attack showed old areas of encephalomalacia are present corresponding to the 2024 brain MR. No new large acute infarct, hemorrhage or mass is noted. Patient not given TNK given suspect alternative diagnosis of breakthrough seizure     CBC showed mild leukocytosis at 13.4, no new onset anemia.  CMP showed normal renal and hepatic functions without critical electrolyte disturbances.  VBG showed acidosis at 7.26, elevated lactate 3.8, bicarb 20.6 and normal anion gap of 13.  Blood toxicology panel was benign.  Troponin was not elevated.  BNP was normal.  Chest x-ray showed mild diffuse interstitial pulmonary edema. POCUS showed a small pericardial effusion with preserved EF.  Keppra level <2.     Administered 3 g IV Keppra. Engaged neurology who advised placing patient on cvEEG.  Patient was admitted to neurology for further management of seizures.               Amber Sheriff PA-C  02/18/25 1956

## 2025-02-18 NOTE — SIGNIFICANT EVENT
Cancelled BAT NOTE:    65 yo man with history of HTN, R parietal and L occipital infarcts in 2022 c/b epilepsy, and arthritis who presented to ED altered. BAT called c/f seizures.     Per ED providers: Patient did not receiving any benzos by EMS. In the ED, witnessed seizure-like activity with following semiology: R head and eye version, left elbow flexed/abducted and right arm extended stiff. Event lasted at most 5 mins, aborted by Ativan.    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   Intervention: Ativan 2 mg, Keppra 1g (total 3g ordered, estimated 60 mg/kg load), Versed 2mg     Collateral Hx per daughter (Cecilia, [phone]):   Patient has not been doing well since discharge from EMU admission in 11/2024.   He was at a facility but reportedly asked his ex-partner to sign him out and drop him off at his daughter's place.  Patient's daughter currently loving with him. Reports LKW 2/18 @ 7AM when she left for work.   Her teenage son called her at 11:30AM reporting that he didn't seem right (he was bleeding from his lip and seemed to be turning over to his left side; did not endorse clear seizure-activity or weakness); however, she heard patient in the background yelling out for help.     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 She reports that he has been refusing care, has not been adherent with medications.     Exam (after given ativan):  Somnolent, snoring, does not follow commands  Eyes half open to nox stim  Eyes midline, VOR intact  No obvious facial droop    LUE  spontaneous AG  LLE withdraws to nox stim  RUE flaccid to nox stim  RLE flicker to nox stim    NIH Stroke Scale  1a  Level of consciousness: 2=not alert, requires repeated stimulation to attend, or is obtunded and requires strong or painful stimulation to make movements (not stereotyped)   1b. LOC questions:  2=Performs neither task correctly   1c. LOC commands: 2=Performs neither task correctly   2.  Best Gaze: 0=normal   3.  Visual: 0=No visual loss   4. Facial Palsy: 0=Normal symmetric movement   5a.  Motor left arm: 1=Drift, limb holds 90 (or 45) degrees but drifts down before full 10 seconds: does not hit bed   5b.  Motor right arm: 3=No effort against gravity, limb falls   6a. motor left le=Some effort against gravity, limb cannot get to or maintain (if cured) 90 (or 45) degrees, drifts down to bed, but has some effort against gravity   6b  Motor right le=Some effort against gravity, limb cannot get to or maintain (if cured) 90 (or 45) degrees, drifts down to bed, but has some effort against gravity   7. Limb Ataxia: 0=Absent   8.  Sensory: 0=Normal; no sensory loss   9. Best Language:  3=Mute, global aphasia; no usable speech or auditory comprehension   10. Dysarthria: 0=Normal   11. Extinction and Inattention: 0=No abnormality   12. Distal motor function: 0=Normal    Total:   17     NIHSS largely confounded by sedative effects from benzos and Keppra.     Motor exam appeared to improve after CT head: RUE withdrawing to nox stim vs. Seemingly flaccid before CTH   Patient not given TNK given suspect alternative diagnosis of breakthrough seizure.

## 2025-02-18 NOTE — SIGNIFICANT EVENT
"Preliminary EEG Report     This vEEG is indicative of a moderate diffuse encephalopathy. No epileptiform activity or lateralizing signs seen.     This EEG was read from 15:53 to 16:15 on 02/18/25 . The final impression will be available tomorrow under Chart Review in the Media tab. To discontinue video EEG, place \"Discontinue Continuous VEEG\" order.      Justin Francisco DO  Adult Epilepsy Fellow   [facility]    "

## 2025-02-18 NOTE — PROCEDURES
Performed by: Lianne Don MD  Authorized by: Seymour Hinkle MD    Procedure: Thoracic Ultrasound    Findings:        L Effusion: The LEFT chest was evaluated and there was NO PLEURAL EFFUSION.            Impression:    Procedure: Renal Ultrasound    Findings:  Right Kidney: The RIGHT kidney was visualized and was NEGATIVE for hydronephrosis.  Left Kidney: The LEFT kidney was visualized and was NEGATIVE for hydronephrosis.  Bladder: The bladder was visualized and was positive for DISTENTION.    Impression:  Renal: normal exam    Comments: Cardiac limited but on parasternal view EF seems normal, small pericardial effusion, IVC collapsible.   Renal: right and left renal cysts identified, possible sediment in bladder    Attending Attestation  I was present during all critical and key portions of the procedure(s), reviewed images and agree with above interpretation.      Lety Wilkins MD
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
## 2025-02-19 ENCOUNTER — APPOINTMENT (OUTPATIENT)
Dept: CARDIOLOGY | Facility: HOSPITAL | Age: 65
DRG: 100 | End: 2025-02-19
Payer: MEDICARE

## 2025-02-19 ENCOUNTER — APPOINTMENT (OUTPATIENT)
Dept: NEUROLOGY | Facility: HOSPITAL | Age: 65
End: 2025-02-19
Payer: MEDICARE

## 2025-02-19 LAB
AMPHETAMINES UR QL SCN: ABNORMAL
ANION GAP SERPL CALC-SCNC: 13 MMOL/L (ref 10–20)
AORTIC VALVE MEAN GRADIENT: 10 MMHG
AORTIC VALVE PEAK VELOCITY: 2.14 M/S
APPEARANCE UR: ABNORMAL
AV PEAK GRADIENT: 18 MMHG
AVA (PEAK VEL): 2.13 CM2
AVA (VTI): 2.29 CM2
BARBITURATES UR QL SCN: ABNORMAL
BASOPHILS # BLD AUTO: 0.06 X10*3/UL (ref 0–0.1)
BASOPHILS NFR BLD AUTO: 0.7 %
BENZODIAZ UR QL SCN: ABNORMAL
BILIRUB UR STRIP.AUTO-MCNC: NEGATIVE MG/DL
BUN SERPL-MCNC: 13 MG/DL (ref 6–23)
BZE UR QL SCN: ABNORMAL
CALCIUM SERPL-MCNC: 8.9 MG/DL (ref 8.6–10.6)
CANNABINOIDS UR QL SCN: ABNORMAL
CHLORIDE SERPL-SCNC: 111 MMOL/L (ref 98–107)
CO2 SERPL-SCNC: 22 MMOL/L (ref 21–32)
COLOR UR: ABNORMAL
CREAT SERPL-MCNC: 0.9 MG/DL (ref 0.5–1.3)
EGFRCR SERPLBLD CKD-EPI 2021: >90 ML/MIN/1.73M*2
EJECTION FRACTION APICAL 4 CHAMBER: 72.4
EJECTION FRACTION: 70 %
EOSINOPHIL # BLD AUTO: 0.04 X10*3/UL (ref 0–0.7)
EOSINOPHIL NFR BLD AUTO: 0.4 %
ERYTHROCYTE [DISTWIDTH] IN BLOOD BY AUTOMATED COUNT: 15.9 % (ref 11.5–14.5)
FENTANYL+NORFENTANYL UR QL SCN: ABNORMAL
GLUCOSE BLD MANUAL STRIP-MCNC: 76 MG/DL (ref 74–99)
GLUCOSE BLD MANUAL STRIP-MCNC: 87 MG/DL (ref 74–99)
GLUCOSE SERPL-MCNC: 75 MG/DL (ref 74–99)
GLUCOSE UR STRIP.AUTO-MCNC: NORMAL MG/DL
HCT VFR BLD AUTO: 38.2 % (ref 41–52)
HGB BLD-MCNC: 13.7 G/DL (ref 13.5–17.5)
IMM GRANULOCYTES # BLD AUTO: 0.03 X10*3/UL (ref 0–0.7)
IMM GRANULOCYTES NFR BLD AUTO: 0.3 % (ref 0–0.9)
KETONES UR STRIP.AUTO-MCNC: ABNORMAL MG/DL
LEFT ATRIUM VOLUME AREA LENGTH INDEX BSA: 26.4 ML/M2
LEFT VENTRICLE INTERNAL DIMENSION DIASTOLE: 4 CM (ref 3.5–6)
LEFT VENTRICULAR OUTFLOW TRACT DIAMETER: 2.2 CM
LEUKOCYTE ESTERASE UR QL STRIP.AUTO: ABNORMAL
LYMPHOCYTES # BLD AUTO: 1.5 X10*3/UL (ref 1.2–4.8)
LYMPHOCYTES NFR BLD AUTO: 16.3 %
MAGNESIUM SERPL-MCNC: 1.94 MG/DL (ref 1.6–2.4)
MCH RBC QN AUTO: 33.5 PG (ref 26–34)
MCHC RBC AUTO-ENTMCNC: 35.9 G/DL (ref 32–36)
MCV RBC AUTO: 93 FL (ref 80–100)
METHADONE UR QL SCN: ABNORMAL
MITRAL VALVE E/A RATIO: 0.65
MONOCYTES # BLD AUTO: 0.52 X10*3/UL (ref 0.1–1)
MONOCYTES NFR BLD AUTO: 5.6 %
MUCOUS THREADS #/AREA URNS AUTO: ABNORMAL /LPF
NEUTROPHILS # BLD AUTO: 7.06 X10*3/UL (ref 1.2–7.7)
NEUTROPHILS NFR BLD AUTO: 76.7 %
NITRITE UR QL STRIP.AUTO: NEGATIVE
NRBC BLD-RTO: 0 /100 WBCS (ref 0–0)
OPIATES UR QL SCN: ABNORMAL
OXYCODONE+OXYMORPHONE UR QL SCN: ABNORMAL
PCP UR QL SCN: ABNORMAL
PH UR STRIP.AUTO: 6 [PH]
PLATELET # BLD AUTO: 269 X10*3/UL (ref 150–450)
POTASSIUM SERPL-SCNC: 3.3 MMOL/L (ref 3.5–5.3)
PROT UR STRIP.AUTO-MCNC: ABNORMAL MG/DL
RBC # BLD AUTO: 4.09 X10*6/UL (ref 4.5–5.9)
RBC # UR STRIP.AUTO: ABNORMAL MG/DL
RBC #/AREA URNS AUTO: >20 /HPF
RIGHT VENTRICLE FREE WALL PEAK S': 19.4 CM/S
SODIUM SERPL-SCNC: 143 MMOL/L (ref 136–145)
SP GR UR STRIP.AUTO: 1.03
TRICUSPID ANNULAR PLANE SYSTOLIC EXCURSION: 2.3 CM
UROBILINOGEN UR STRIP.AUTO-MCNC: ABNORMAL MG/DL
WBC # BLD AUTO: 9.2 X10*3/UL (ref 4.4–11.3)
WBC #/AREA URNS AUTO: ABNORMAL /HPF

## 2025-02-19 PROCEDURE — 36415 COLL VENOUS BLD VENIPUNCTURE: CPT

## 2025-02-19 PROCEDURE — 93306 TTE W/DOPPLER COMPLETE: CPT | Performed by: INTERNAL MEDICINE

## 2025-02-19 PROCEDURE — 0932T N-INVS DET HRT FAIL AUG ECHO: CPT

## 2025-02-19 PROCEDURE — 2500000004 HC RX 250 GENERAL PHARMACY W/ HCPCS (ALT 636 FOR OP/ED)

## 2025-02-19 PROCEDURE — 80307 DRUG TEST PRSMV CHEM ANLYZR: CPT

## 2025-02-19 PROCEDURE — 1200000002 HC GENERAL ROOM WITH TELEMETRY DAILY

## 2025-02-19 PROCEDURE — 85025 COMPLETE CBC W/AUTO DIFF WBC: CPT

## 2025-02-19 PROCEDURE — 82947 ASSAY GLUCOSE BLOOD QUANT: CPT

## 2025-02-19 PROCEDURE — 80048 BASIC METABOLIC PNL TOTAL CA: CPT

## 2025-02-19 PROCEDURE — C9254 INJECTION, LACOSAMIDE: HCPCS

## 2025-02-19 PROCEDURE — 87086 URINE CULTURE/COLONY COUNT: CPT

## 2025-02-19 PROCEDURE — 2500000002 HC RX 250 W HCPCS SELF ADMINISTERED DRUGS (ALT 637 FOR MEDICARE OP, ALT 636 FOR OP/ED)

## 2025-02-19 PROCEDURE — 2500000001 HC RX 250 WO HCPCS SELF ADMINISTERED DRUGS (ALT 637 FOR MEDICARE OP)

## 2025-02-19 PROCEDURE — 83735 ASSAY OF MAGNESIUM: CPT

## 2025-02-19 PROCEDURE — 81001 URINALYSIS AUTO W/SCOPE: CPT

## 2025-02-19 PROCEDURE — 99232 SBSQ HOSP IP/OBS MODERATE 35: CPT

## 2025-02-19 RX ORDER — POTASSIUM CHLORIDE 14.9 MG/ML
20 INJECTION INTRAVENOUS ONCE
Status: COMPLETED | OUTPATIENT
Start: 2025-02-19 | End: 2025-02-19

## 2025-02-19 RX ORDER — POTASSIUM CHLORIDE 20 MEQ/1
40 TABLET, EXTENDED RELEASE ORAL ONCE
Status: COMPLETED | OUTPATIENT
Start: 2025-02-19 | End: 2025-02-19

## 2025-02-19 RX ORDER — CEFTRIAXONE 1 G/50ML
1 INJECTION, SOLUTION INTRAVENOUS EVERY 24 HOURS
Status: DISCONTINUED | OUTPATIENT
Start: 2025-02-19 | End: 2025-02-22

## 2025-02-19 RX ADMIN — THIAMINE HYDROCHLORIDE 500 MG: 100 INJECTION, SOLUTION INTRAMUSCULAR; INTRAVENOUS at 08:36

## 2025-02-19 RX ADMIN — LEVETIRACETAM 1500 MG: 15 INJECTION INTRAVENOUS at 05:24

## 2025-02-19 RX ADMIN — THIAMINE HYDROCHLORIDE 500 MG: 100 INJECTION, SOLUTION INTRAMUSCULAR; INTRAVENOUS at 15:05

## 2025-02-19 RX ADMIN — CEFTRIAXONE SODIUM 1 G: 1 INJECTION, SOLUTION INTRAVENOUS at 15:05

## 2025-02-19 RX ADMIN — PERFLUTREN 1 ML OF DILUTION: 6.52 INJECTION, SUSPENSION INTRAVENOUS at 11:34

## 2025-02-19 RX ADMIN — POTASSIUM CHLORIDE 20 MEQ: 200 INJECTION, SOLUTION INTRAVENOUS at 01:18

## 2025-02-19 RX ADMIN — POTASSIUM CHLORIDE 20 MEQ: 1500 TABLET, EXTENDED RELEASE ORAL at 01:19

## 2025-02-19 RX ADMIN — LISINOPRIL 20 MG: 20 TABLET ORAL at 08:35

## 2025-02-19 RX ADMIN — ASPIRIN 81 MG: 81 TABLET, COATED ORAL at 08:39

## 2025-02-19 RX ADMIN — POTASSIUM CHLORIDE 20 MEQ: 200 INJECTION, SOLUTION INTRAVENOUS at 03:08

## 2025-02-19 RX ADMIN — POTASSIUM CHLORIDE 20 MEQ: 14.9 INJECTION, SOLUTION INTRAVENOUS at 12:33

## 2025-02-19 RX ADMIN — LACOSAMIDE 100 MG: 10 INJECTION INTRAVENOUS at 18:19

## 2025-02-19 RX ADMIN — ATORVASTATIN CALCIUM 40 MG: 40 TABLET, FILM COATED ORAL at 20:22

## 2025-02-19 RX ADMIN — LACOSAMIDE 100 MG: 10 INJECTION INTRAVENOUS at 05:39

## 2025-02-19 RX ADMIN — POTASSIUM CHLORIDE 40 MEQ: 1500 TABLET, EXTENDED RELEASE ORAL at 12:34

## 2025-02-19 RX ADMIN — ENOXAPARIN SODIUM 40 MG: 40 INJECTION, SOLUTION SUBCUTANEOUS at 18:08

## 2025-02-19 RX ADMIN — LEVETIRACETAM 1500 MG: 15 INJECTION INTRAVENOUS at 12:35

## 2025-02-19 RX ADMIN — AMLODIPINE BESYLATE 10 MG: 10 TABLET ORAL at 08:35

## 2025-02-19 ASSESSMENT — PAIN SCALES - GENERAL: PAINLEVEL_OUTOF10: 0 - NO PAIN

## 2025-02-19 NOTE — PROGRESS NOTES
Pharmacy Medication History Review    Josiah James is a 64 y.o. male admitted for Seizure (Multi). Pharmacy reviewed the patient's [id number] medications and allergies for accuracy.    Medications ADDED:  None   Medications CHANGED:  None   Medications REMOVED:   None      The list below reflects the updated PTA list.   Prior to Admission Medications   Prescriptions Last Dose Informant   acetaminophen (Tylenol) 500 mg tablet  Child   Sig: Take 2 tablets (1,000 mg) by mouth every 6 hours if needed for mild pain (1 - 3).   amLODIPine (Norvasc) 10 mg tablet Not Taking Child   Sig: Take 1 tablet (10 mg) by mouth once daily.   Patient not taking: Reported on 2/19/2025   aspirin 81 mg EC tablet Not Taking Child   Sig: Take 1 tablet (81 mg) by mouth once daily.   Patient not taking: Reported on 2/19/2025   atorvastatin (Lipitor) 40 mg tablet Not Taking Child   Sig: Take 1 tablet (40 mg) by mouth once daily at bedtime.   Patient not taking: Reported on 2/19/2025   gabapentin (Neurontin) 100 mg capsule Not Taking Child   Sig: Take 1 capsule (100 mg) by mouth once daily as needed (Take once daily as needed for hiccups; can discontinue this medication if not needed for hiccups).   Patient not taking: Reported on 2/19/2025 Medication made patient violent.    lacosamide (Vimpat) 100 mg tablet Not Taking Child   Sig: Take 1 tablet (100 mg) by mouth every 12 hours.   Patient not taking: Reported on 2/19/2025   levETIRAcetam (Keppra) 750 mg tablet Not Taking Child   Sig: Take 2 tablets (1,500 mg) by mouth 2 times a day.   Patient not taking: Reported on 2/19/2025   lisinopril 20 mg tablet Not Taking Child   Sig: Take 1 tablet (20 mg) by mouth once daily.   Patient not taking: Reported on 2/19/2025   mirtazapine (Remeron) 7.5 mg tablet  Child   Sig: Take 1 tablet (7.5 mg) by mouth once daily at bedtime.   Patient not taking: Reported on 11/9/2024   pantoprazole (ProtoNix) 40 mg EC tablet Not Taking Child   Sig: Take 1 tablet  "(40 mg) by mouth once daily in the morning. Take before meals. Do not crush, chew, or split.   Patient not taking: Reported on 2/19/2025      Facility-Administered Medications: None        The list below reflects the updated allergy list. Please review each documented allergy for additional clarification and justification.  Allergies  Reviewed by Luisa Walker on 2/19/2025   No Known Allergies         Patient accepts M2B at discharge.     Sources:   [facility]  Pharmacy dispense history  Patient interview Moderate historian  Child  Chart Review  Care Everywhere    Discharge med list from 11/14/24    Additional Comments:  Patient daughter stated Patient is noncompliant with his medications. Patient has not being taking any medications.  Patient daughter stated gabapentin made the patient violent.  Only confirmed pharmacy fills were in 2024 for pantoprazole, amlodipine, and mirtazapine      Luisa Walker  Pharmacy Technician  02/19/25     Secure Chat preferred   If no response call [phone] or [facility] \"Med Rec\"   "

## 2025-02-19 NOTE — PROGRESS NOTES
Josiah James is a 64 y.o. male on day 1 of admission presenting with Seizure (Multi).      Subjective   This morning, the patient was sitting up in bed speaking to the sitter. He began crying when she moved to the other side of the room. He does not recall any events that occurred yesterday and initially believed that he had a stroke. He was appropriately conversational although very hard of hearing. He does not recall taking any anti-seizure medications at home. Per conversation with daughter on the phone, it appears that he does not take any medications at home.     Objective   Last Recorded Vitals  Blood pressure (!) 145/96, pulse 71, temperature 36.6 °C (97.9 °F), temperature source Tympanic, resp. rate 16, SpO2 95%.    Physical Exam  Neurological Exam:  MENTAL STATUS:  General appearance: Alert, sitting up in bed, very emotionally labile  Orientation: Oriented to person, knew he was in a hospital that wasn't [facility], said it was 3/2024.   Language: Repetition largely intact although has significant difficulties with hearing    CRANIAL NERVES:  - II:  RHH present   - III, IV, VI: TRACEY, EOMI to pursuit without nystagmus  - V: V1-V3 sensation intact bilaterally  - VII: Face muscles symmetric with smile and eye closure  - VIII: Intact to loud voice  - IX, X: Palate elevated symmetrically bilaterally, no hoarseness  - XI: 5/5 strength on shoulder shrugging bilaterally  - XII: Tongue midline without atrophy or fasciculation    MOTOR: Increased tone in LUE.     STRENGTH: R L  Deltoid  5 4+  Biceps  5 5  Triceps  5 5    5 5  Quadriceps 4 3  Hamstrings 5 5  DorsiFlex 5          4  PlantarFlex 5 5    REFLEXES: R  L  Biceps   2+ 2+  Triceps   2+ 2+  Brachioradialis  2+ 2+  Patellar   3+ 3+  Achilles  2+ 2+  Plantar   * Up  * Unable to assess, would withdraw his foot repeatedly    COORDINATION: Intact on finger to nose bl, unable to follow instructions for heel to shin bl    SENSORY: Intact to light touch    ROMBERG:  Deferred     Neurological Exam  Medications:  Current Outpatient Medications   Medication Instructions    acetaminophen (TYLENOL) 1,000 mg, Every 6 hours PRN    amLODIPine (NORVASC) 10 mg, oral, Daily    aspirin 81 mg, oral, Daily    atorvastatin (LIPITOR) 40 mg, oral, Nightly    gabapentin (NEURONTIN) 100 mg, oral, Daily PRN    lacosamide (VIMPAT) 100 mg, oral, Every 12 hours scheduled    levETIRAcetam (KEPPRA) 1,500 mg, oral, 2 times daily    lisinopril 20 mg, oral, Daily    mirtazapine (REMERON) 7.5 mg, Nightly    pantoprazole (PROTONIX) 40 mg, oral, Daily before breakfast, Do not crush, chew, or split.     Recent Labs:  Results for orders placed or performed during the hospital encounter of 02/18/25 (from the past 24 hours)   POCT GLUCOSE   Result Value Ref Range    POCT Glucose 198 (H) 74 - 99 mg/dL   CBC and Auto Differential   Result Value Ref Range    WBC 13.4 (H) 4.4 - 11.3 x10*3/uL    nRBC 0.0 0.0 - 0.0 /100 WBCs    RBC 4.58 4.50 - 5.90 x10*6/uL    Hemoglobin 15.1 13.5 - 17.5 g/dL    Hematocrit 43.5 41.0 - 52.0 %    MCV 95 80 - 100 fL    MCH 33.0 26.0 - 34.0 pg    MCHC 34.7 32.0 - 36.0 g/dL    RDW 15.6 (H) 11.5 - 14.5 %    Platelets 385 150 - 450 x10*3/uL    Neutrophils % 67.3 40.0 - 80.0 %    Immature Granulocytes %, Automated 0.4 0.0 - 0.9 %    Lymphocytes % 26.0 13.0 - 44.0 %    Monocytes % 4.5 2.0 - 10.0 %    Eosinophils % 0.8 0.0 - 6.0 %    Basophils % 1.0 0.0 - 2.0 %    Neutrophils Absolute 8.98 (H) 1.20 - 7.70 x10*3/uL    Immature Granulocytes Absolute, Automated 0.06 0.00 - 0.70 x10*3/uL    Lymphocytes Absolute 3.47 1.20 - 4.80 x10*3/uL    Monocytes Absolute 0.60 0.10 - 1.00 x10*3/uL    Eosinophils Absolute 0.11 0.00 - 0.70 x10*3/uL    Basophils Absolute 0.14 (H) 0.00 - 0.10 x10*3/uL   Comprehensive metabolic panel   Result Value Ref Range    Glucose 179 (H) 74 - 99 mg/dL    Sodium 140 136 - 145 mmol/L    Potassium 3.8 3.5 - 5.3 mmol/L    Chloride 105 98 - 107 mmol/L    Bicarbonate 11 (L) 21 - 32  mmol/L    Anion Gap 28 (H) 10 - 20 mmol/L    Urea Nitrogen 15 6 - 23 mg/dL    Creatinine 1.07 0.50 - 1.30 mg/dL    eGFR 77 >60 mL/min/1.73m*2    Calcium 9.5 8.6 - 10.6 mg/dL    Albumin 4.4 3.4 - 5.0 g/dL    Alkaline Phosphatase 114 33 - 136 U/L    Total Protein 7.0 6.4 - 8.2 g/dL    AST 21 9 - 39 U/L    Bilirubin, Total 1.3 (H) 0.0 - 1.2 mg/dL    ALT 13 10 - 52 U/L   Troponin I, High Sensitivity   Result Value Ref Range    Troponin I, High Sensitivity (CMC) 8 0 - 53 ng/L   Protime-INR   Result Value Ref Range    Protime 11.4 9.8 - 12.8 seconds    INR 1.0 0.9 - 1.1   APTT   Result Value Ref Range    aPTT 27 27 - 38 seconds   Levetiracetam   Result Value Ref Range    Keppra <2 (L) 10 - 40 ug/mL   B-type natriuretic peptide   Result Value Ref Range    BNP 75 0 - 99 pg/mL   Acute Toxicology Panel, Blood   Result Value Ref Range    Acetaminophen <10.0 10.0 - 30.0 ug/mL    Salicylate  <3 4 - 20 mg/dL    Alcohol <10 <=10 mg/dL   ECG 12 lead   Result Value Ref Range    Ventricular Rate 88 BPM    Atrial Rate 88 BPM    NV Interval 180 ms    QRS Duration 86 ms    QT Interval 386 ms    QTC Calculation(Bazett) 467 ms    P Axis 57 degrees    R Axis -10 degrees    T Axis 68 degrees    QRS Count 15 beats    Q Onset 221 ms    P Onset 131 ms    P Offset 192 ms    T Offset 414 ms    QTC Fredericia 438 ms   Sars-CoV-2 and Influenza A/B PCR   Result Value Ref Range    Flu A Result Not Detected Not Detected    Flu B Result Not Detected Not Detected    Coronavirus 2019, PCR Not Detected Not Detected   Lactate   Result Value Ref Range    Lactate 1.2 0.4 - 2.0 mmol/L   Renal function panel   Result Value Ref Range    Glucose 76 74 - 99 mg/dL    Sodium 142 136 - 145 mmol/L    Potassium 3.0 (L) 3.5 - 5.3 mmol/L    Chloride 110 (H) 98 - 107 mmol/L    Bicarbonate 24 21 - 32 mmol/L    Anion Gap 11 10 - 20 mmol/L    Urea Nitrogen 13 6 - 23 mg/dL    Creatinine 0.88 0.50 - 1.30 mg/dL    eGFR >90 >60 mL/min/1.73m*2    Calcium 8.2 (L) 8.6 - 10.6  mg/dL    Phosphorus 3.2 2.5 - 4.9 mg/dL    Albumin 3.5 3.4 - 5.0 g/dL   CBC and Auto Differential   Result Value Ref Range    WBC 9.2 4.4 - 11.3 x10*3/uL    nRBC 0.0 0.0 - 0.0 /100 WBCs    RBC 4.09 (L) 4.50 - 5.90 x10*6/uL    Hemoglobin 13.7 13.5 - 17.5 g/dL    Hematocrit 38.2 (L) 41.0 - 52.0 %    MCV 93 80 - 100 fL    MCH 33.5 26.0 - 34.0 pg    MCHC 35.9 32.0 - 36.0 g/dL    RDW 15.9 (H) 11.5 - 14.5 %    Platelets 269 150 - 450 x10*3/uL    Neutrophils % 76.7 40.0 - 80.0 %    Immature Granulocytes %, Automated 0.3 0.0 - 0.9 %    Lymphocytes % 16.3 13.0 - 44.0 %    Monocytes % 5.6 2.0 - 10.0 %    Eosinophils % 0.4 0.0 - 6.0 %    Basophils % 0.7 0.0 - 2.0 %    Neutrophils Absolute 7.06 1.20 - 7.70 x10*3/uL    Immature Granulocytes Absolute, Automated 0.03 0.00 - 0.70 x10*3/uL    Lymphocytes Absolute 1.50 1.20 - 4.80 x10*3/uL    Monocytes Absolute 0.52 0.10 - 1.00 x10*3/uL    Eosinophils Absolute 0.04 0.00 - 0.70 x10*3/uL    Basophils Absolute 0.06 0.00 - 0.10 x10*3/uL   Basic metabolic panel   Result Value Ref Range    Glucose 75 74 - 99 mg/dL    Sodium 143 136 - 145 mmol/L    Potassium 3.3 (L) 3.5 - 5.3 mmol/L    Chloride 111 (H) 98 - 107 mmol/L    Bicarbonate 22 21 - 32 mmol/L    Anion Gap 13 10 - 20 mmol/L    Urea Nitrogen 13 6 - 23 mg/dL    Creatinine 0.90 0.50 - 1.30 mg/dL    eGFR >90 >60 mL/min/1.73m*2    Calcium 8.9 8.6 - 10.6 mg/dL   Magnesium   Result Value Ref Range    Magnesium 1.94 1.60 - 2.40 mg/dL     Images:   ECG 12 lead    Result Date: 2/18/2025  Sinus rhythm with Premature atrial complexes Cannot rule out Inferior infarct , age undetermined Abnormal ECG When compared with ECG of 13-NOV-2024 15:29, Premature atrial complexes are now Present See ED provider note for full interpretation and clinical correlation Confirmed by Anamika Larkin ([id number]) on 2/18/2025 10:49:23 PM    Point of Care Ultrasound    Result Date: 2/18/2025  Lianne Don MD     2/18/2025  4:13 PM Performed by: Lianne Don MD  Authorized by: Seymour Olivier MD  Procedure: Thoracic Ultrasound Findings: R Lung Sliding: The RIGHT chest was evaluated and LUNG SLIDING was visualized. L Lung Sliding: The LEFT chest was evaluated and LUNG SLIDING was visualized. R Effusion: The RIGHT chest was evaluated and there was NO PLEURAL EFFUSION. L Effusion: The LEFT chest was evaluated and there was NO PLEURAL EFFUSION. A-lines: The RIGHT chest was evaluated and multiple A-LINES were visualized B-lines: The RIGHT chest was evaluated and there were NO B-LINES visualized Impression: Thorax: The focused thoracic ultrasound exam was NORMAL. Procedure: Renal Ultrasound Findings: Right Kidney: The RIGHT kidney was visualized and was NEGATIVE for hydronephrosis. Left Kidney: The LEFT kidney was visualized and was NEGATIVE for hydronephrosis. Bladder: The bladder was visualized and was positive for DISTENTION. Impression: Renal: normal exam Comments: Cardiac limited but on parasternal view EF seems normal, small pericardial effusion, IVC collapsible. Renal: right and left renal cysts identified, possible sediment in bladder    XR chest 1 view    Result Date: 2/18/2025  Interpreted By:  Paz Ledezma, STUDY: Chest, single AP view.   INDICATION: Signs/Symptoms:AMS, transient hypoxia.   COMPARISON: Chest radiograph dated 11/12/2024.   ACCESSION NUMBER(S): [id number]   ORDERING CLINICIAN: SEYMOUR OLIVIER   FINDINGS: The cardiac silhouette size is enlarged. No focal consolidation or pneumothorax. There is diffuse increase in interstitial lung markings. Atherosclerotic changes of the aortic knob. No sizeable pleural effusion. No acute osseous abnormality. Loop recorder device visualized projecting over the left lower thorax.       1. Findings of mild diffuse interstitial pulmonary edema 2. Enlarged cardiomediastinal silhouette suggesting cardiomegaly and/or pericardial effusion.   MACRO: None.   Signed by: Paz Ledezma 2/18/2025 2:25 PM Dictation workstation:    [id number]    CT brain attack angio head and neck W and WO IV contrast    Result Date: 2/18/2025  Interpreted By:  Octavio Miller, STUDY: CT BRAIN ATTACK ANGIO HEAD AND NECK W AND WO IV CONTRAST;  2/18/2025 12:42 pm   INDICATION: Signs/Symptoms:right sided paralysis.     COMPARISON: 02/18/2025 brain CT, jean 1224 brain MR and 11/08/2024 CT angiogram   ACCESSION NUMBER(S): [id number]   ORDERING CLINICIAN: FAUSTINO OLIVIER   TECHNIQUE: Unenhanced CT images of the head were obtained. Subsequently, 90 ML of Omnipaque 350 was administered intravenously and axial images of the head and neck were acquired.  Coronal, sagittal, and 3-D reconstructions were provided for review.   FINDINGS:     CTA HEAD FINDINGS:   Anterior circulation: The bilateral intracranial internal carotid arteries, bilateral carotid terminals, bilateral proximal anterior and middle cerebral arteries are normal.   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 No stenoses of the intradural vertebral arteries, basilar artery or posterior cerebral arteries are noted.   CTA NECK FINDINGS:   Right carotid vessels: The common carotid artery is normal. The carotid bifurcation is normal. The right internal carotid bulb has non stenotic atherosclerotic calcification. There is 0% stenosis  .   Left carotid vessels: The common carotid artery is normal. The left common carotid bifurcation has atherosclerotic calcifications extending into the carotid bulb with no stenosis. The internal carotid artery in the neck is otherwise normal. There is 0% stenosis .   Vertebral vessels:  The visualized segments of the cervical vertebral arteries are normal in caliber.         1. 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   2. No significant stenoses of the cervical carotid/vertebral or in for cerebral  circulations are noted.   MACRO: None   Signed by: Octavio Miller 2/18/2025 1:00 PM Dictation workstation:   [id number]    CT brain attack head wo IV contrast    Result Date: 2/18/2025  Interpreted By:  Octavio Miller, STUDY: CT BRAIN ATTACK HEAD WO IV CONTRAST;  2/18/2025 12:37 pm   INDICATION: Signs/Symptoms:Stroke Evaluation.     COMPARISON: 11/12/2024 brain MR   ACCESSION NUMBER(S): [id number]   ORDERING CLINICIAN: FAUSTINO OLIVIER   TECHNIQUE: Noncontrast axial CT scan of head was performed. Angled reformats in brain and bone windows were generated. The images were reviewed in bone, brain, blood and soft tissue windows.   FINDINGS: Old areas of cystic encephalomalacia are present along the right parietal region extending inferiorly toward temporal lobe and along the medial aspect of the left occipital lobe corresponding to the abnormalities on the 2024 brain M R.   No new acute infarct is noted. No hemorrhage or mass is noted.   The paranasal sinuses and mastoid air cells are normally aerated.       Old areas of encephalomalacia are present corresponding to the 2024 brain MR.   No new large acute infarct, hemorrhage or mass is noted.   MACRO: Critical Finding:  See findings. Notification was initiated on 2/18/2025 at 12:47 pm by  Octavio Miller.  (**-OCF-**)   Signed by: Octavio Miller 2/18/2025 12:47 PM Dictation workstation:   [id number]     [place] Coma Scale  Best Eye Response: To pain  Best Verbal Response: Confused  Best Motor Response: Localizes pain  Allan Coma Scale Score: 11        Assessment/Plan      Assessment & Plan  Seizure (Multi)    Cardiomegaly    Josiah James is a 64 y.o. male with PMHx of HTN, R parietal, left occipital strokes (2022, unknown etiology) c/b epilepsy (11/2024). Cervical myelopathy s/p C3-T4 laminectomy c/b T2 epidural abscess (2020) who presented as a BAT for seizures with right sided weakness. CTH/CTA was without acute findings. His right flaccidity improved with later examinations  and he has mild left gaze preference now which is likely post ictal. Keppra level <2. BT seizure likely 2/2 to non-compliance but will look up additional causes. He is s/p keppra 3.5g and 2mg Ativan and 5mg Versed. CXR is concerning for cardiomegaly and diffuse interstitial pulmonary edema.      4D Classification of the Paroxysmal Episodes:  Epileptic  Epileptogenic Zone: Right hemisphere  Semiology: R Versive > RUE, Rt face tonic > GTC  Frequency: 2x 11/2024, 2x 2/2025  Localizing Signs: post ictal Rt Todds, Aphasia  Diagnostic Signs: Tongue bite  Etiology: Stroke  Co-morbidities: Cognitive impairment, non compliance  Previous AEDs: NA  Current AEDs: Keppra 1.5g BID, Lacosamide 100mg BID.     Updates 2/19/25:  - Pending UA, utox. Serum tox panel negative  - Pending TTE given c/f small pericardial effusion on POCUS  - No need for cvEEG given he is now awake, alert, and largely at baseline   - PT/OT evaluation, family may not be able to take care of him at home     #BT seizure:   - Admit to tele/floor  - Continue keppra 1.5mg BID  - Continue Vimpat 100mg BID  [ ] Pending routine EEG final read  [ ] UA with reflex to culture, Urine tox screen, serum toxicology panel  - Start Thiamine 500mg TID for 3 days then 100 mg daily  - 2mg Ativan IV PRN for generalzied convulsions lasting >3-5 minutes.     #HTN  #Cardiomegaly/pericardial effusion and mild diffuse pulmonary edema on CXR  :: Clinically he is on RA  :: Pocus ultrasound of the chest showed small pericardial effusion, collapsible IVC  - Home meds: lisinopril 20mg, amlodipine 10mg daily  - Hydralazine/labetalol PRN for SBP > 180  - Will get formal TTE    #Hx of R parietal and L occipital strokes in 2022  :: Etiology unknown, possibly cardioembolic?  - C/w home aspirin 81, atorvastatin 40mg      #GERD  - C/w home protonix     Diet: Regular  DVT ppx: Lovenox  Code status: DNR, ok for intubation(discussed with his daughter, who reported that these are his past wishes  which she agrees with).   NOK: Daughter Cecilia James [phone]     Martha Parker MD  Neurology, PGY-2

## 2025-02-19 NOTE — ED PROCEDURE NOTE
Procedure  Critical Care    Performed by: Seymour Hinkle MD  Authorized by: Seymour Hinkle MD    Critical care provider statement:     Critical care time (minutes):  48    Critical care time was exclusive of:  Separately billable procedures and treating other patients and teaching time    Critical care was necessary to treat or prevent imminent or life-threatening deterioration of the following conditions:  CNS failure or compromise    Critical care was time spent personally by me on the following activities:  Blood draw for specimens, discussions with consultants, evaluation of patient's response to treatment, development of treatment plan with patient or surrogate, examination of patient, obtaining history from patient or surrogate, ordering and performing treatments and interventions, ordering and review of laboratory studies, ordering and review of radiographic studies, pulse oximetry, re-evaluation of patient's condition and review of old charts               Seymour Hinkle MD  02/19/25 1622       Seymour Hinkle MD  02/19/25 162

## 2025-02-20 LAB
ALBUMIN SERPL BCP-MCNC: 4 G/DL (ref 3.4–5)
ANION GAP SERPL CALC-SCNC: 13 MMOL/L (ref 10–20)
BASOPHILS # BLD AUTO: 0.06 X10*3/UL (ref 0–0.1)
BASOPHILS NFR BLD AUTO: 0.8 %
BUN SERPL-MCNC: 10 MG/DL (ref 6–23)
CALCIUM SERPL-MCNC: 9.1 MG/DL (ref 8.6–10.6)
CHLORIDE SERPL-SCNC: 111 MMOL/L (ref 98–107)
CO2 SERPL-SCNC: 22 MMOL/L (ref 21–32)
CREAT SERPL-MCNC: 0.81 MG/DL (ref 0.5–1.3)
EGFRCR SERPLBLD CKD-EPI 2021: >90 ML/MIN/1.73M*2
EOSINOPHIL # BLD AUTO: 0.06 X10*3/UL (ref 0–0.7)
EOSINOPHIL NFR BLD AUTO: 0.8 %
ERYTHROCYTE [DISTWIDTH] IN BLOOD BY AUTOMATED COUNT: 16 % (ref 11.5–14.5)
GLUCOSE BLD MANUAL STRIP-MCNC: 74 MG/DL (ref 74–99)
GLUCOSE SERPL-MCNC: 74 MG/DL (ref 74–99)
HCT VFR BLD AUTO: 40.4 % (ref 41–52)
HGB BLD-MCNC: 13.1 G/DL (ref 13.5–17.5)
HOLD SPECIMEN: NORMAL
IMM GRANULOCYTES # BLD AUTO: 0.04 X10*3/UL (ref 0–0.7)
IMM GRANULOCYTES NFR BLD AUTO: 0.6 % (ref 0–0.9)
LACOSAMIDE SERPL-MCNC: <0.5 UG/ML (ref 1–10)
LYMPHOCYTES # BLD AUTO: 1.47 X10*3/UL (ref 1.2–4.8)
LYMPHOCYTES NFR BLD AUTO: 20.7 %
MAGNESIUM SERPL-MCNC: 1.91 MG/DL (ref 1.6–2.4)
MCH RBC QN AUTO: 32.2 PG (ref 26–34)
MCHC RBC AUTO-ENTMCNC: 32.4 G/DL (ref 32–36)
MCV RBC AUTO: 99 FL (ref 80–100)
MONOCYTES # BLD AUTO: 0.52 X10*3/UL (ref 0.1–1)
MONOCYTES NFR BLD AUTO: 7.3 %
NEUTROPHILS # BLD AUTO: 4.94 X10*3/UL (ref 1.2–7.7)
NEUTROPHILS NFR BLD AUTO: 69.8 %
NRBC BLD-RTO: 0 /100 WBCS (ref 0–0)
PHOSPHATE SERPL-MCNC: 2.2 MG/DL (ref 2.5–4.9)
PLATELET # BLD AUTO: 256 X10*3/UL (ref 150–450)
POTASSIUM SERPL-SCNC: 3.8 MMOL/L (ref 3.5–5.3)
RBC # BLD AUTO: 4.07 X10*6/UL (ref 4.5–5.9)
SODIUM SERPL-SCNC: 142 MMOL/L (ref 136–145)
WBC # BLD AUTO: 7.1 X10*3/UL (ref 4.4–11.3)

## 2025-02-20 PROCEDURE — 2500000004 HC RX 250 GENERAL PHARMACY W/ HCPCS (ALT 636 FOR OP/ED)

## 2025-02-20 PROCEDURE — 2500000001 HC RX 250 WO HCPCS SELF ADMINISTERED DRUGS (ALT 637 FOR MEDICARE OP)

## 2025-02-20 PROCEDURE — 92523 SPEECH SOUND LANG COMPREHEN: CPT | Mod: GN

## 2025-02-20 PROCEDURE — 83735 ASSAY OF MAGNESIUM: CPT

## 2025-02-20 PROCEDURE — 80069 RENAL FUNCTION PANEL: CPT

## 2025-02-20 PROCEDURE — 97166 OT EVAL MOD COMPLEX 45 MIN: CPT | Mod: GO

## 2025-02-20 PROCEDURE — 2500000005 HC RX 250 GENERAL PHARMACY W/O HCPCS

## 2025-02-20 PROCEDURE — 82947 ASSAY GLUCOSE BLOOD QUANT: CPT

## 2025-02-20 PROCEDURE — 36415 COLL VENOUS BLD VENIPUNCTURE: CPT

## 2025-02-20 PROCEDURE — 85025 COMPLETE CBC W/AUTO DIFF WBC: CPT

## 2025-02-20 PROCEDURE — 97162 PT EVAL MOD COMPLEX 30 MIN: CPT | Mod: GP

## 2025-02-20 PROCEDURE — 99232 SBSQ HOSP IP/OBS MODERATE 35: CPT

## 2025-02-20 PROCEDURE — 1200000002 HC GENERAL ROOM WITH TELEMETRY DAILY

## 2025-02-20 PROCEDURE — C9254 INJECTION, LACOSAMIDE: HCPCS

## 2025-02-20 RX ORDER — LEVETIRACETAM 250 MG/1
1500 TABLET ORAL EVERY 12 HOURS
Status: DISCONTINUED | OUTPATIENT
Start: 2025-02-20 | End: 2025-02-25 | Stop reason: HOSPADM

## 2025-02-20 RX ORDER — LACOSAMIDE 100 MG/1
100 TABLET ORAL EVERY 12 HOURS
Status: DISCONTINUED | OUTPATIENT
Start: 2025-02-20 | End: 2025-02-25 | Stop reason: HOSPADM

## 2025-02-20 RX ADMIN — LACOSAMIDE 100 MG: 100 TABLET, FILM COATED ORAL at 20:30

## 2025-02-20 RX ADMIN — POTASSIUM PHOSPHATE, MONOBASIC POTASSIUM PHOSPHATE, DIBASIC 15 MMOL: 224; 236 INJECTION, SOLUTION, CONCENTRATE INTRAVENOUS at 15:36

## 2025-02-20 RX ADMIN — ASPIRIN 81 MG: 81 TABLET, COATED ORAL at 08:54

## 2025-02-20 RX ADMIN — THIAMINE HYDROCHLORIDE 500 MG: 100 INJECTION, SOLUTION INTRAMUSCULAR; INTRAVENOUS at 20:30

## 2025-02-20 RX ADMIN — LISINOPRIL 20 MG: 20 TABLET ORAL at 08:54

## 2025-02-20 RX ADMIN — LEVETIRACETAM 1500 MG: 15 INJECTION INTRAVENOUS at 01:48

## 2025-02-20 RX ADMIN — LEVETIRACETAM 1500 MG: 250 TABLET, FILM COATED ORAL at 22:58

## 2025-02-20 RX ADMIN — AMLODIPINE BESYLATE 10 MG: 10 TABLET ORAL at 08:54

## 2025-02-20 RX ADMIN — LEVETIRACETAM 1500 MG: 15 INJECTION INTRAVENOUS at 12:36

## 2025-02-20 RX ADMIN — ATORVASTATIN CALCIUM 40 MG: 40 TABLET, FILM COATED ORAL at 20:30

## 2025-02-20 RX ADMIN — LACOSAMIDE 100 MG: 10 INJECTION INTRAVENOUS at 08:53

## 2025-02-20 RX ADMIN — PANTOPRAZOLE SODIUM 40 MG: 40 TABLET, DELAYED RELEASE ORAL at 06:22

## 2025-02-20 RX ADMIN — THIAMINE HYDROCHLORIDE 500 MG: 100 INJECTION, SOLUTION INTRAMUSCULAR; INTRAVENOUS at 14:54

## 2025-02-20 RX ADMIN — THIAMINE HYDROCHLORIDE 500 MG: 100 INJECTION, SOLUTION INTRAMUSCULAR; INTRAVENOUS at 08:53

## 2025-02-20 RX ADMIN — CEFTRIAXONE SODIUM 1 G: 1 INJECTION, SOLUTION INTRAVENOUS at 14:54

## 2025-02-20 RX ADMIN — ENOXAPARIN SODIUM 40 MG: 40 INJECTION, SOLUTION SUBCUTANEOUS at 16:09

## 2025-02-20 ASSESSMENT — COGNITIVE AND FUNCTIONAL STATUS - GENERAL
WALKING IN HOSPITAL ROOM: TOTAL
DRESSING REGULAR LOWER BODY CLOTHING: TOTAL
TURNING FROM BACK TO SIDE WHILE IN FLAT BAD: A LOT
DAILY ACTIVITIY SCORE: 12
TOILETING: A LOT
MOVING TO AND FROM BED TO CHAIR: A LOT
MOBILITY SCORE: 9
STANDING UP FROM CHAIR USING ARMS: TOTAL
PERSONAL GROOMING: A LOT
CLIMB 3 TO 5 STEPS WITH RAILING: TOTAL
DRESSING REGULAR UPPER BODY CLOTHING: A LOT
EATING MEALS: A LITTLE
MOVING FROM LYING ON BACK TO SITTING ON SIDE OF FLAT BED WITH BEDRAILS: A LOT
HELP NEEDED FOR BATHING: A LOT

## 2025-02-20 ASSESSMENT — PAIN - FUNCTIONAL ASSESSMENT
PAIN_FUNCTIONAL_ASSESSMENT: 0-10

## 2025-02-20 ASSESSMENT — PAIN SCALES - GENERAL
PAINLEVEL_OUTOF10: 0 - NO PAIN

## 2025-02-20 ASSESSMENT — ACTIVITIES OF DAILY LIVING (ADL)
ADL_ASSISTANCE: NEEDS ASSISTANCE
BATHING_ASSISTANCE: MAXIMAL
LACK_OF_TRANSPORTATION: NO

## 2025-02-20 NOTE — PROGRESS NOTES
02/20/25 0900   Discharge Planning   Living Arrangements Children   Support Systems Children   Type of Residence Private residence   Number of Stairs to Enter Residence 3   Number of Stairs Within Residence 12   Do you have animals or pets at home? Yes   Type of Animals or Pets 2   Who is requesting discharge planning? Provider   Home or Post Acute Services Post acute facilities (Rehab/SNF/etc)   Type of Post Acute Facility Services Skilled nursing   Expected Discharge Disposition SNF   Does the patient need discharge transport arranged? Yes   RoundTrip coordination needed? Yes   Financial Resource Strain   How hard is it for you to pay for the very basics like food, housing, medical care, and heating? Not hard   Housing Stability   In the last 12 months, was there a time when you were not able to pay the mortgage or rent on time? N   In the past 12 months, how many times have you moved where you were living? 0   At any time in the past 12 months, were you homeless or living in a shelter (including now)? N   Transportation Needs   In the past 12 months, has lack of transportation kept you from medical appointments or from getting medications? no   In the past 12 months, has lack of transportation kept you from meetings, work, or from getting things needed for daily living? No     Met with pt and introduced myself as care coordinator with Care Transitions Team for discharge planning Daughter (Cecilia FLOWERS) completed assessment. Poa reports being dependent with ADL's for the most part. Pt uses Wheelchair for assistance with ambulation. Pt Resides with Daughter. Pojoe reported no safety concerns at home, she stated two falls in last year. Pt's address, phone number, and contact information was verified.      PCP: n/a Open to  PCP  DATE OF LAST VISIT: 9 Months Ago  PHARMACY: [facility]  MEDICATIONS AFFORDABLE:Yes  FEELS SAFE AT HOME: Yes  RECENT FALLS: 2  EQUIPMENT USED IN HOME: Hospital Bed, WC  HOME O2/CPAP/NEBS:  n/a  DME SUPPLIER: Unknown  TRANSPORT HOME: Yes  DIABETIC/SUPPLIES NEEDED: n/a  HD SCHEDULE: n/a      Transitional Care Coordinator Note: Patient discussed with medical team, per medical team patient is not medically ready. Discharge dispo: Pending OT/PT Evals.  ADOD 3-4 Days.  Mike Xie RN  Transitional Care Coordinator

## 2025-02-20 NOTE — PROGRESS NOTES
Physical Therapy    Physical Therapy Evaluation    Patient Name: Josiah James  MRN: [id number]  Department: [place] [id number]  Room: [address] [place] [address]  Today's Date: 2/20/2025   Time Calculation  Start Time: 1216  Stop Time: 1232  Time Calculation (min): 16 min    Assessment/Plan   PT Assessment  PT Assessment Results: Decreased strength, Decreased range of motion, Decreased endurance, Impaired balance, Decreased mobility  Rehab Prognosis: Excellent  Barriers to Discharge Home: Caregiver assistance, Physical needs  Caregiver Assistance: Patient lives alone and/or does not have reliable caregiver assistance  Physical Needs: 24hr mobility assistance needed  Evaluation/Treatment Tolerance: Patient tolerated treatment well  End of Session Communication: Bedside nurse  Assessment Comment: 64 y.o. M h/o CVA p/w seizures and R sided weakness. Demonstrating impaired function to that of baseline and will benefit from continued PT in house and after discharge at MOD intensity. If pt declines MOD intensity, patient will require 24 hr assist and LOW intensity. (Pt with questionable assist through daughter?)  End of Session Patient Position: Bed, 3 rail up, Alarm on  IP OR SWING BED PT PLAN  Inpatient or Swing Bed: Inpatient  PT Plan  Treatment/Interventions: Bed mobility, Transfer training, Balance training, Strengthening, Therapeutic exercise, Therapeutic activity  PT Plan: Ongoing PT  PT Frequency: 3 times per week  PT Discharge Recommendations: Moderate intensity level of continued care  PT Recommended Transfer Status: Assist x1  PT - OK to Discharge: Yes    Subjective   General Visit Information:  General  Reason for Referral: seizure  Past Medical History Relevant to Rehab: 64 y.o. male with PMHx of HTN, R parietal, left occipital strokes (2022, unknown etiology) c/b epilepsy (11/2024). Cervical myelopathy s/p C3-T4 laminectomy c/b T2 epidural abscess (2020) who presented as a BAT for seizures with right sided weakness  Family/Caregiver  "Present: No  Prior to Session Communication: Physician  Patient Position Received: Bed, 3 rail up, Alarm on  Preferred Learning Style: verbal, auditory  General Comment: Pt resting in bed upon entry. Pt agitated with current hospitalization and wanting to go home. Pt receptive to attempt mobility initially though declining attempts to stand.  Home Living:  Home Living  Type of Home: House  Lives With: Alone (Patient reports daughter lives nearby/few houses down the street and assists patient.)  Home Adaptive Equipment: Walker rolling or standard, Wheelchair-power  Home Layout: One level  Home Living Comments: Pt reports his daughter had been assisting him though now with questionable available assist as pt commented, \"She's the one who brought me here becaues she's trying to take my house!\"  Prior Level of Function:  Prior Function Per Pt/Caregiver Report  Level of [place]: Needs assistance with ADLs, Needs assistance with homemaking  Receives Help From: Family (daughter)  ADL Assistance: Needs assistance  Homemaking Assistance: Needs assistance  Ambulatory Assistance:  (Pt reports he hasn't been able to ambulate since his CVA, though uses a walker to stand and transfer to a wheelchair)  Precautions:  Precautions  Medical Precautions: Fall precautions, Seizure precautions     Objective   Pain:  Pain Assessment  Pain Assessment: 0-10  0-10 (Numeric) Pain Score: 0 - No pain  Cognition:  Cognition  Overall Cognitive Status: Within Functional Limits  Orientation Level: Oriented X4 (Pt tended to talk off topic and would often require redirection to keep on topic.)  Safety Judgment: Decreased awareness of need for safety precautions  Insight: Mild  Impulsive: Mildly    General Assessments:     Activity Tolerance  Endurance: Decreased tolerance for upright activites    Sensation  Light Touch: No apparent deficits    Strength  Strength Comments: Pt with BLE weakness. Pt with L sided residual weakness at baseline and " now also with new onset R sided weakness. Grossly >3,3+/5 throughout.  Strength  Strength Comments: Pt with BLE weakness. Pt with L sided residual weakness at baseline and now also with new onset R sided weakness. Grossly >3,3+/5 throughout.    Coordination  Movements are Fluid and Coordinated: No    Postural Control  Postural Control: Impaired  Posture Comment: significant flexed posture.    Static Sitting Balance  Static Sitting-Balance Support: Bilateral upper extremity supported  Static Sitting-Level of Assistance: Contact guard  Static Sitting-Comment/Number of Minutes: EOB approximately 7-8.    Static Standing Balance  Static Standing-Comment/Number of Minutes: Patient declining attempts to stand though anticipate would require min/mod assist.  Functional Assessments:       Bed Mobility  Bed Mobility: Yes  Bed Mobility 1  Bed Mobility 1: Supine to sitting, Sitting to supine  Level of Assistance 1: Minimum assistance    Transfers  Transfer: No (anticipate would require min/mod assist for sit-stand transfer)    Ambulation/Gait Training  Ambulation/Gait Training Performed:  (Pt reports he recently has been non ambulatory. Uses a wheelchair. Daughter assists with transfers.)    Outcome Measures:  [facility] Basic Mobility  Turning from your back to your side while in a flat bed without using bedrails: A lot  Moving from lying on your back to sitting on the side of a flat bed without using bedrails: A lot  Moving to and from bed to chair (including a wheelchair): A lot  Standing up from a chair using your arms (e.g. wheelchair or bedside chair): Total  To walk in hospital room: Total  Climbing 3-5 steps with railing: Total  Basic Mobility - Total Score: 9    Encounter Problems       Encounter Problems (Active)       PT Transfers       STG - Transfer from bed to chair min assist       Start:  02/20/25    Expected End:  03/06/25            STG - Patient to transfer to and from sit to supine CGA       Start:  02/20/25     Expected End:  03/06/25            STG - Patient will transfer sit to and from stand min assist       Start:  02/20/25    Expected End:  03/06/25               Pain - Adult              Education Documentation  Precautions, taught by Curtis Richey, PT at 2/20/2025  1:48 PM.  Learner: Patient  Readiness: Acceptance  Method: Explanation  Response: Verbalizes Understanding    Mobility Training, taught by Curtis Richey, PT at 2/20/2025  1:48 PM.  Learner: Patient  Readiness: Acceptance  Method: Explanation  Response: Verbalizes Understanding    Education Comments  No comments found.

## 2025-02-20 NOTE — PROGRESS NOTES
Speech-Language Pathology  Adult Inpatient Cognitive Evaluation    Patient Name: Josiah James  MRN: [id number]  Today's Date: 2/20/2025   Start Time: 1120  Stop Time: 1145  Time Calculation (min): 25 mins    History of Present Illness:   Josiah James is a 64 y.o. male with PMHx of HTN, R parietal, left occipital strokes (2022, unknown etiology) c/b epilepsy (11/2024) Cervical myelopathy s/p C3-T4 laminectomy c/b T2 epidural abscess(2020) who presented as a BAT for seizures with right sided weakness.      History taken from discussion with ED and pt daughter as pt is encephalopathic.      Per daughter, he has been living with her for the past few years, at baseline, he is able to converse, use the phone, but has significant short term memory impairment. He has not been taking any of his medications.     He has been at his baseline this AM, then later around 11AM, his grandson heard him screaming. He went and saw him in his bed was confused and agitated and screaming, and his left leg was jerking. They also noticed blood coming out of his mouth. EMS were called and he presented to ED.      In the ED, He was noted to have blood around the mouth and later noted to have a seizure described as right head jerking with tonic LUE posturing, during this he was able to look at examiner and attempted to answer questions but could not, this later progressed to right version and right gaze deviation(after at least 3-5 minutes) with pulling of the right face and later his RUE was flexed at the elbow. The whole event lasted 10 minutes. During the Event he got 2mg Ativan then 5mg Versed which later stopped the seizure. He was noted to have blood at his mouth and was initially desatting to ~70% but later improved and decision was to not intubate. He was noted to have right sided weakness after the event then a BAT was called. He was also loaded with keppra 3.5g per his weight.     He got a CTH and CTA H/N which showed no acute arterial  occlusion, known prior strokes and known basilar tip aneurysm. See BAT note for Other details.     Vitals: /104, , RR 16, SpO2 96% on NC   Blood work:   WBC 13.4  RFP Cr 1.07, bicarb 11, LFTs wnl  Troponin 8    VBG 7.26/46. Lactate 3.8    Assessment:   Cognitive evaluation completed. Pt presenting with a moderate cognitive impairment. Pt reports he has never had a great memory however his cognitive decline began with this hospitalization. He denies having word-finding difficulties, and has fluent, intelligible speech.     Pt completed the SLUMS ([facility] Mental Status Exam). Pt has difficulty with his vision and was unable to complete the visual and drawing tasks. Pt has difficulty with orientation, memory, simple calculations, information manipulation, and attention to details. Pt was able to complete personal conversational tasks relative to his work and family although could not recall dog's name. Pt became confused trying to discern SLPs question on when and why he moved to [place] from [place]. Pt answered situational safety questions and simple problem solving with 100% accuracy.      Recommend Speech Therapy Services:   Yes; Targeting orientation and memory strategies.     Goal(s):     Pt will correctly orient using environmental cues to 80% with min reminders.    Pt will correctly recall 3 units of information with 2 minute delay to 100% accuracy with min cues.     Start Date: 2/20/25  End Date: 3/20/25  Status: Goal Initiated this date       Plan:  SLP Services Indicated: Yes  Frequency: 2x week  Discussed POC with patient  SLP - OK to Discharge    Pain:   0-10  0 = No pain.     Inpatient Education:  Extensive education provided to patient regarding current speech/language/cognitive function, recommendations/results, and POC.      Consultations/Referrals/Coordination of Services:   N/A

## 2025-02-20 NOTE — PROGRESS NOTES
Josiah James is a 64 y.o. male on day 2 of admission presenting with Seizure (Multi).    Subjective   This morning, was laying in bed comfortably. Denies any acute concerns at this time including CP, SOB, abdominal pain, pain with urination.     Objective   Last Recorded Vitals  Blood pressure (!) 156/97, pulse 70, temperature 36.5 °C (97.7 °F), temperature source Temporal, resp. rate 17, SpO2 96%.    Physical Exam  Neurological Exam:  MENTAL STATUS:  General appearance: Easily awakens to voice  Orientation: Oriented to person, hospital, did not know month or year.     CRANIAL NERVES:  - II:  R HH present   - III, IV, VI: TRACEY, EOMI to pursuit without nystagmus  - V: V1-V3 sensation intact bilaterally  - VII: Face muscles symmetric with smile and eye closure  - VIII: Intact to loud voice  - IX, X: Palate elevated symmetrically bilaterally, no hoarseness  - XI: 5/5 strength on shoulder shrugging bilaterally  - XII: Tongue midline without atrophy or fasciculation    MOTOR: Increased tone in LUE.     STRENGTH: R L  Deltoid  5 5-  Biceps  5 4+  Triceps  5 5    5 5  Quadriceps 4 3  Hamstrings 5 5  DorsiFlex 5          4  PlantarFlex 5 5    REFLEXES: R  L  Biceps   2+ 2+  Triceps   2+ 2+  Brachioradialis  2+ 2+  Patellar   3+ 3+  Achilles  2+ 2+  Plantar   * Up  * Unable to assess, would withdraw his foot repeatedly    COORDINATION: Intact on finger to nose bl, unable to follow instructions for heel to shin bl    SENSORY: Intact to light touch    ROMBERG: Deferred     Neurological Exam  Medications:  Current Outpatient Medications   Medication Instructions    acetaminophen (TYLENOL) 1,000 mg, oral, Every 6 hours PRN    amLODIPine (NORVASC) 10 mg, Daily    aspirin 81 mg, Daily    atorvastatin (LIPITOR) 40 mg, oral, Nightly    gabapentin (NEURONTIN) 100 mg, oral, Daily PRN    lacosamide (VIMPAT) 100 mg, oral, Every 12 hours scheduled    levETIRAcetam (KEPPRA) 1,500 mg, oral, 2 times daily    lisinopril 20 mg, oral,  Daily    mirtazapine (REMERON) 7.5 mg, Nightly    pantoprazole (PROTONIX) 40 mg, oral, Daily before breakfast, Do not crush, chew, or split.     Recent Labs:  Results for orders placed or performed during the hospital encounter of 02/18/25 (from the past 24 hours)   Transthoracic Echo (TTE) Complete   Result Value Ref Range    AV pk donnie 2.14 m/s    AV mn grad 10 mmHg    LVOT diam 2.20 cm    MV E/A ratio 0.65     LA vol index A/L 26.4 ml/m2    Tricuspid annular plane systolic excursion 2.3 cm    LV EF 70 %    RV free wall pk S' 19.40 cm/s    LVIDd 4.00 cm    Aortic Valve Area by Continuity of VTI 2.29 cm2    Aortic Valve Area by Continuity of Peak Velocity 2.13 cm2    AV pk grad 18 mmHg    LV A4C EF 72.4    Drug Screen, Urine   Result Value Ref Range    Amphetamine Screen, Urine Presumptive Negative Presumptive Negative    Barbiturate Screen, Urine Presumptive Negative Presumptive Negative    Benzodiazepines Screen, Urine Presumptive Positive (A) Presumptive Negative    Cannabinoid Screen, Urine Presumptive Positive (A) Presumptive Negative    Cocaine Metabolite Screen, Urine Presumptive Negative Presumptive Negative    Fentanyl Screen, Urine Presumptive Negative Presumptive Negative    Opiate Screen, Urine Presumptive Negative Presumptive Negative    Oxycodone Screen, Urine Presumptive Negative Presumptive Negative    PCP Screen, Urine Presumptive Negative Presumptive Negative    Methadone Screen, Urine Presumptive Negative Presumptive Negative   Urinalysis with Reflex Culture and Microscopic   Result Value Ref Range    Color, Urine Orange (N) Light-Yellow, Yellow, Dark-Yellow    Appearance, Urine Ex.Turbid (N) Clear    Specific Gravity, Urine 1.032 1.005 - 1.035    pH, Urine 6.0 5.0, 5.5, 6.0, 6.5, 7.0, 7.5, 8.0    Protein, Urine 30 (1+) (A) NEGATIVE, 10 (TRACE), 20 (TRACE) mg/dL    Glucose, Urine Normal Normal mg/dL    Blood, Urine 0.1 (1+) (A) NEGATIVE mg/dL    Ketones, Urine 10 (1+) (A) NEGATIVE mg/dL     Bilirubin, Urine NEGATIVE NEGATIVE mg/dL    Urobilinogen, Urine 6 (2+) (A) Normal mg/dL    Nitrite, Urine NEGATIVE NEGATIVE    Leukocyte Esterase, Urine 250 Juanita/uL (A) NEGATIVE   Extra Urine Gray Tube   Result Value Ref Range    Extra Tube Hold for add-ons.    Microscopic Only, Urine   Result Value Ref Range    WBC, Urine 21-50 (A) 1-5, NONE /HPF    RBC, Urine >20 (A) NONE, 1-2, 3-5 /HPF    Mucus, Urine 4+ Reference range not established. /LPF   POCT GLUCOSE   Result Value Ref Range    POCT Glucose 87 74 - 99 mg/dL   POCT GLUCOSE   Result Value Ref Range    POCT Glucose 76 74 - 99 mg/dL     Images:   Transthoracic Echo (TTE) Complete    Result Date: 2/19/2025   [facility], [address] [place] [address]                Tel [phone] and Fax [phone] TRANSTHORACIC ECHOCARDIOGRAM REPORT  Patient Name:       JESSICA Mccall Physician:    [id number] Misael Oswald MD Study Date:         2/19/2025           Ordering Provider:    [id number] OGLESBY MRN/PID:            [id number]            Fellow: Accession#:         [id number]        Nurse:                Hui Patiño RN Date of Birth/Age:  1960 / 64     Sonographer:          GERMAN Landry RDCS Gender assigned at  M                   Additional Staff:     Jenniffer Jaramillo Birth:                                                        Intern Height:             172.72 cm           Admit Date: Weight:             77.11 kg            Admission Status:     Inpatient -                                                               Routine BSA / BMI:          1.91 m2 / 25.85     Encounter#:           [id number]                     kg/m2 Blood Pressure:     145/96 mmHg         Department Location:  [facility]                                                                Non Invasive Study Type:    TRANSTHORACIC ECHO (TTE) COMPLETE Diagnosis/ICD: Cardiomegaly-I51.7 Indication:    cardiomegaly CPT Code:      Echo Complete w Full [id number] Patient History: Pertinent History: Cardiomegaly, HTN, stroke, seizures. Study Detail: The following Echo studies were performed: 2D, M-Mode, Doppler and               color flow. Definity used as a contrast agent for endocardial               border definition. Total contrast used for this procedure was 2 mL               via IV push.  PHYSICIAN INTERPRETATION: Left Ventricle: Left ventricular ejection fraction is normal, calculated by Charles's biplane at 70%. There are no regional left ventricular wall motion abnormalities. The left ventricular cavity size is normal. There is normal septal and normal posterior left ventricular wall thickness. Left ventricular diastolic filling is indeterminate. Left Atrium: The left atrial size is normal. Right Ventricle: The right ventricle is normal in size. There is normal right ventricular global systolic function. Right Atrium: The right atrium is normal in size. Aortic Valve: The aortic valve is probably trileaflet. There is moderate aortic valve cusp calcification. There is reduced aortic valve right coronary cusp excursion. There is evidence of mild aortic valve stenosis. The aortic valve dimensionless index is 0.60. There is trace aortic valve regurgitation. The peak instantaneous gradient of the aortic valve is 18 mmHg. The mean gradient of the aortic valve is 10 mmHg. Mitral Valve: The mitral valve is normal in structure. There is trace mitral valve regurgitation. Tricuspid Valve: The tricuspid valve is structurally normal. There is trace tricuspid regurgitation. Pulmonic Valve: The pulmonic valve is not well visualized. There is trace pulmonic valve regurgitation. Pericardium: Trivial pericardial effusion. Aorta: The aortic root was not well visualized. The aorta  was not well visualized. There is upper limits of normal dilatation of the ascending aorta. The aortic root is at the upper limits of normal size. Systemic Veins: The inferior vena cava appears normal in size. In comparison to the previous echocardiogram(s): There are no prior studies on this patient for comparison purposes.  CONCLUSIONS:  1. Left ventricular ejection fraction is normal, calculated by Charles's biplane at 70%.  2. There is normal right ventricular global systolic function.  3. There is moderate aortic valve cusp calcification (right coronary cusp).  4. Mild aortic valve stenosis. RECOMMENDATIONS: Utilizing an FDA cleared automated machine learning algorithm (EchoGo Heart Failure by [employer]), the analysis of the apical 4-chamber echocardiogram suggests the presence of heart failure with preserved ejection fraction (HFpEF)*. Clinical correlation looking for additional heart failure signs and symptoms is recommended, as a definite diagnosis of heart failure cannot be made by imaging alone. *Per ACC/AHA/HFSA universal diagnosis of heart failure, HFpEF is defined as 1) signs and symptoms leading to clinical diagnosis of heart failure, 2) an ejection fraction of at least 50%, and 3) evidence of elevated intra-cardiac filling pressures by echocardiography, BNP elevation, or catheterization.  QUANTITATIVE DATA SUMMARY:  2D MEASUREMENTS:          Normal Ranges: Ao Root d:       3.60 cm  (2.0-3.7cm) LAs:             3.20 cm  (2.7-4.0cm) IVSd:            0.70 cm  (0.6-1.1cm) LVPWd:           0.80 cm  (0.6-1.1cm) LVIDd:           4.00 cm  (3.9-5.9cm) LVIDs:           2.40 cm LV Mass Index:   45 g/m2 LVEDV Index:     65 ml/m2 LV % FS          40.0 %  LEFT ATRIUM:                  Normal Ranges: LA Vol A4C:        46.3 ml    (22+/-6mL/m2) LA Vol A2C:        53.1 ml LA Vol BP:         50.4 ml LA Vol Index A4C:  24.3ml/m2 LA Vol Index A2C:  27.8 ml/m2 LA Vol Index BP:   26.4 ml/m2 LA Area A4C:       15.6 cm2 LA  Area A2C:       17.0 cm2 LA Major Axis A4C: 4.5 cm LA Major Axis A2C: 4.6 cm LA Volume Index:   26.4 ml/m2  RIGHT ATRIUM:          Normal Ranges: RA Area A4C:  16.0 cm2  AORTA MEASUREMENTS:         Normal Ranges: Ao Sinus, d:        3.60 cm (2.1-3.5cm) Asc Ao, d:          3.30 cm (2.1-3.4cm)  LV SYSTOLIC FUNCTION:                      Normal Ranges: EF-A4C View:    72 % (>=55%) EF-A2C View:    65 % EF-Biplane:     70 % LV EF Reported: 70 %  LV DIASTOLIC FUNCTION:             Normal Ranges: MV Peak E:             0.59 m/s    (0.7-1.2 m/s) MV Peak A:             0.90 m/s    (0.42-0.7 m/s) E/A Ratio:             0.65        (1.0-2.2) MV e'                  0.053 m/s   (>8.0) MV lateral e'          0.07 m/s MV medial e'           0.03 m/s MV A Dur:              127.00 msec E/e' Ratio:            11.10       (<8.0) MV DT:                 344 msec    (150-240 msec)  MITRAL VALVE:          Normal Ranges: MV DT:        344 msec (150-240msec)  AORTIC VALVE:                      Normal Ranges: AoV Vmax:                2.14 m/s  (<=1.7m/s) AoV Peak P.3 mmHg (<20mmHg) AoV Mean PG:             10.0 mmHg (1.7-11.5mmHg) LVOT Max Han:            1.20 m/s  (<=1.1m/s) AoV VTI:                 39.90 cm  (18-25cm) LVOT VTI:                24.00 cm LVOT Diameter:           2.20 cm   (1.8-2.4cm) AoV Area, VTI:           2.29 cm2  (2.5-5.5cm2) AoV Area,Vmax:           2.13 cm2  (2.5-4.5cm2) AoV Dimensionless Index: 0.60  RIGHT VENTRICLE: RV Basal 4.70 cm RV Mid   3.30 cm TAPSE:   22.7 mm RV s'    0.19 m/s  TRICUSPID VALVE/RVSP:         Normal Ranges: IVC Diam:             1.70 cm  PULMONIC VALVE:          Normal Ranges: PV Accel Time:  66 msec  (>120ms) PV Max Han:     0.9 m/s  (0.6-0.9m/s) PV Max PG:      3.4 mmHg  [id number] Misael Oswald MD Electronically signed on 2025 at 2:13:42 PM  ** Final **     EEG    IMPRESSION Impression This routine EEG is indicative of a moderate diffuse encephalopathy. No epileptiform  activity or lateralizing signs seen. A full report will be scanned into the patient's chart at a later time. This report has been interpreted and electronically signed by    ECG 12 lead    Result Date: 2/18/2025  Sinus rhythm with Premature atrial complexes Cannot rule out Inferior infarct , age undetermined Abnormal ECG When compared with ECG of 13-NOV-2024 15:29, Premature atrial complexes are now Present See ED provider note for full interpretation and clinical correlation Confirmed by Anamika Larkin ([id number]) on 2/18/2025 10:49:23 PM    Point of Care Ultrasound    Result Date: 2/18/2025  Lianne Don MD     2/18/2025  4:13 PM Performed by: Lianne Don MD Authorized by: Seymour Hinkle MD  Procedure: Thoracic Ultrasound Findings: R Lung Sliding: The RIGHT chest was evaluated and LUNG SLIDING was visualized. L Lung Sliding: The LEFT chest was evaluated and LUNG SLIDING was visualized. R Effusion: The RIGHT chest was evaluated and there was NO PLEURAL EFFUSION. L Effusion: The LEFT chest was evaluated and there was NO PLEURAL EFFUSION. A-lines: The RIGHT chest was evaluated and multiple A-LINES were visualized B-lines: The RIGHT chest was evaluated and there were NO B-LINES visualized Impression: Thorax: The focused thoracic ultrasound exam was NORMAL. Procedure: Renal Ultrasound Findings: Right Kidney: The RIGHT kidney was visualized and was NEGATIVE for hydronephrosis. Left Kidney: The LEFT kidney was visualized and was NEGATIVE for hydronephrosis. Bladder: The bladder was visualized and was positive for DISTENTION. Impression: Renal: normal exam Comments: Cardiac limited but on parasternal view EF seems normal, small pericardial effusion, IVC collapsible. Renal: right and left renal cysts identified, possible sediment in bladder    XR chest 1 view    Result Date: 2/18/2025  Interpreted By:  Paz Ledezma, STUDY: Chest, single AP view.   INDICATION: Signs/Symptoms:AMS, transient hypoxia.   COMPARISON: Chest  radiograph dated 11/12/2024.   ACCESSION NUMBER(S): [id number]   ORDERING CLINICIAN: FAUSTINO OLIVIER   FINDINGS: The cardiac silhouette size is enlarged. No focal consolidation or pneumothorax. There is diffuse increase in interstitial lung markings. Atherosclerotic changes of the aortic knob. No sizeable pleural effusion. No acute osseous abnormality. Loop recorder device visualized projecting over the left lower thorax.       1. Findings of mild diffuse interstitial pulmonary edema 2. Enlarged cardiomediastinal silhouette suggesting cardiomegaly and/or pericardial effusion.   MACRO: None.   Signed by: Paz Ledezma 2/18/2025 2:25 PM Dictation workstation:   [id number]    CT brain attack angio head and neck W and WO IV contrast    Result Date: 2/18/2025  Interpreted By:  Octavio Miller, STUDY: CT BRAIN ATTACK ANGIO HEAD AND NECK W AND WO IV CONTRAST;  2/18/2025 12:42 pm   INDICATION: Signs/Symptoms:right sided paralysis.     COMPARISON: 02/18/2025 brain CT, pena 1224 brain MR and 11/08/2024 CT angiogram   ACCESSION NUMBER(S): [id number]   ORDERING CLINICIAN: FAUSTINO OLIVIER   TECHNIQUE: Unenhanced CT images of the head were obtained. Subsequently, 90 ML of Omnipaque 350 was administered intravenously and axial images of the head and neck were acquired.  Coronal, sagittal, and 3-D reconstructions were provided for review.   FINDINGS:     CTA HEAD FINDINGS:   Anterior circulation: The bilateral intracranial internal carotid arteries, bilateral carotid terminals, bilateral proximal anterior and middle cerebral arteries are normal.   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 No stenoses of the intradural vertebral arteries, basilar artery or posterior cerebral arteries are noted.   CTA NECK FINDINGS:   Right carotid vessels: The common carotid artery is normal. The carotid bifurcation is normal. The right internal  carotid bulb has non stenotic atherosclerotic calcification. There is 0% stenosis  .   Left carotid vessels: The common carotid artery is normal. The left common carotid bifurcation has atherosclerotic calcifications extending into the carotid bulb with no stenosis. The internal carotid artery in the neck is otherwise normal. There is 0% stenosis .   Vertebral vessels:  The visualized segments of the cervical vertebral arteries are normal in caliber.         1. 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   2. No significant stenoses of the cervical carotid/vertebral or in for cerebral circulations are noted.   MACRO: None   Signed by: Octavio Miller 2/18/2025 1:00 PM Dictation workstation:   [id number]    CT brain attack head wo IV contrast    Result Date: 2/18/2025  Interpreted By:  Octavio Miller, STUDY: CT BRAIN ATTACK HEAD WO IV CONTRAST;  2/18/2025 12:37 pm   INDICATION: Signs/Symptoms:Stroke Evaluation.     COMPARISON: 11/12/2024 brain MR   ACCESSION NUMBER(S): [id number]   ORDERING CLINICIAN: FAUSTINO OLIVIER   TECHNIQUE: Noncontrast axial CT scan of head was performed. Angled reformats in brain and bone windows were generated. The images were reviewed in bone, brain, blood and soft tissue windows.   FINDINGS: Old areas of cystic encephalomalacia are present along the right parietal region extending inferiorly toward temporal lobe and along the medial aspect of the left occipital lobe corresponding to the abnormalities on the 2024 brain M R.   No new acute infarct is noted. No hemorrhage or mass is noted.   The paranasal sinuses and mastoid air cells are normally aerated.       Old areas of encephalomalacia are present corresponding to the 2024 brain MR.   No new large acute infarct, hemorrhage or mass is noted.   MACRO: Critical Finding:  See findings. Notification was initiated on 2/18/2025 at 12:47 pm by  Octavio Miller.  (**-OCF-**)    Signed by: Octavio Miller 2/18/2025 12:47 PM Dictation workstation:   [id number]     [place] Coma Scale  Best Eye Response: Spontaneous  Best Verbal Response: Oriented  Best Motor Response: Follows commands  [place] Coma Scale Score: 15        Assessment/Plan      Assessment & Plan  Seizure (Multi)    Cardiomegaly    Josiah James is a 64 y.o. male with PMHx of HTN, R parietal, left occipital strokes (2022, unknown etiology) c/b epilepsy (11/2024). Cervical myelopathy s/p C3-T4 laminectomy c/b T2 epidural abscess (2020) who presented as a BAT for seizures with right sided weakness. CTH/CTA was without acute findings. His right flaccidity improved with later examinations and he has mild left gaze preference now which is likely post ictal. Keppra level <2. He is s/p keppra 3.5g and 2mg Ativan and 5mg Versed. Currently now back on his home LEV 1.5g BID, LAC 100mg BID. Seizures likely caused by medication non-compliance and UTI.      4D Classification of the Paroxysmal Episodes:  Epileptic  Epileptogenic Zone: Right hemisphere  Semiology: R Versive > RUE, Rt face tonic > GTC  Frequency: 2x 11/2024, 2x 2/2025  Localizing Signs: post ictal Rt Todds, Aphasia  Diagnostic Signs: Tongue bite  Etiology: Stroke  Co-morbidities: Cognitive impairment, non compliance  Previous AEDs: NA  Current AEDs: Keppra 1.5g BID, Lacosamide 100mg BID.     Updates 2/20:  - Pending PT/OT evaluation    #Breakthrough seizures  :: Routine EEG 2/18: Moderate diffuse encephalopathy   :: Seizures likely secondary to medication non-compliance and UTI  - Continue keppra 1.5mg BID  - Continue Vimpat 100mg BID  - Start Thiamine 500mg TID for 3 days then 100 mg daily  - 2mg Ativan IV PRN for generalzied convulsions lasting >3-5 minutes.    #UTI   :: UA: , WBC 21-50   - Started on CTX 2/19 for 5 days  - Ucx pending    #HTN  :: Clinically he is on RA  :: Pocus ultrasound of the chest showed small pericardial effusion, collapsible IVC  - Home meds:  lisinopril 20mg, amlodipine 10mg daily  - Hydralazine/labetalol PRN for SBP > 180  - TTE: LVEF 70%, mod aortic valve cusp calcification, mild AV stenosis     #Hx of R parietal and L occipital strokes in 2022  - C/w home aspirin 81, atorvastatin 40mg      #GERD  - C/w home protonix     Diet: Regular  DVT ppx: Lovenox  Code status: DNR, ok for intubation(discussed with his daughter, who reported that these are his past wishes which she agrees with).   NOK: Daughter Cecilia James [phone]     Martha Parker MD  Neurology, PGY-2

## 2025-02-20 NOTE — CARE PLAN
Transitional Care Coordinator Note: Patient discussed with medical team, per medical team patient is not medically ready. Discharge dispo: SNF  ADOD 2-3 Days  Mike Xie RN  Transitional Care Coordinator      TCC obtained SNF choices from POA.    [facility] Accepted Patient Pending Precert. DCR Team asked to Submit Precert.    Update: 2/22  Precert Still Pending

## 2025-02-20 NOTE — PROGRESS NOTES
Occupational Therapy    Evaluation    Patient Name: Josiah James  MRN: [id number]  Department: [facility] [id number]  Room: [address] [place] [address]  Today's Date: 2/20/2025  Time Calculation  Start Time: 0954  Stop Time: 1009  Time Calculation (min): 15 min    Assessment  IP OT Assessment  OT Assessment: difficulty I/ADLs, safety, fxnl mob  Prognosis: Good  Barriers to Discharge Home: Caregiver assistance, Cognition needs, Physical needs  Caregiver Assistance: Caregiver assistance needed per identified barriers - however, level of patient's required assistance exceeds assistance available at home  Cognition Needs: 24hr supervision for safety awareness needed  Physical Needs: 24hr mobility assistance needed, 24hr ADL assistance needed, High falls risk due to function or environment, Stair navigation to access bed limited by function/safety  Evaluation/Treatment Tolerance: Patient tolerated treatment well  Medical Staff Made Aware: Yes  End of Session Communication: Bedside nurse  End of Session Patient Position: Bed, 3 rail up, Alarm on  Plan:  Treatment Interventions: ADL retraining, Functional transfer training, UE strengthening/ROM, Endurance training, Cognitive reorientation, Patient/family training, Equipment evaluation/education, Compensatory technique education  OT Frequency: 3 times per week  OT Discharge Recommendations: Moderate intensity level of continued care  Equipment Recommended upon Discharge: Wheeled walker, Bedside commode  OT Recommended Transfer Status: Assist of 1  OT - OK to Discharge: Yes    Subjective   Current Problem:  1. Seizure (Multi)  EEG    Point of Care Ultrasound    Point of Care Ultrasound    Referral to Neurology    Referral to Neurology    CANCELED: EEG      2. Cardiomegaly  Transthoracic Echo (TTE) Complete    Transthoracic Echo (TTE) Complete        General:  General  Reason for Referral: Seizure  Past Medical History Relevant to Rehab: HTN, R parietal, left occipital strokes (2022, unknown etiology) c/b  "epilepsy (11/2024). Cervical myelopathy s/p C3-T4 laminectomy c/b T2 epidural abscess (2020) who presented as a BAT for seizures with right sided weakness. CTH/CTA was without acute findings. His right flaccidity improved with later examinations and he has mild left gaze preference now which is likely post ictal. Keppra level <2. He is s/p keppra 3.5g and 2mg Ativan and 5mg Versed. Currently now back on his home LEV 1.5g BID, LAC 100mg BID. Seizures likely caused by medication non-compliance and UTI.  Family/Caregiver Present: No  Prior to Session Communication: Bedside nurse  Patient Position Received: Bed, 3 rail up, Alarm on  General Comment: delayed processing  Precautions:  Medical Precautions: Fall precautions, Seizure precautions     Date/Time Vitals Session Patient Position Pulse Resp SpO2 BP MAP (mmHg)    02/20/25 0954 During OT  --  73  --  --  --  --     02/20/25 1138 --  --  --  --  96 %  155/88  110                Pain:  Pain Assessment  Pain Assessment: 0-10  0-10 (Numeric) Pain Score: 0 - No pain    Objective   Cognition:  Overall Cognitive Status: Impaired  Orientation Level:  (\"Fri Nov 10th 1999\" date, repeated 1999 for birthday year Ox self, birth mo and birthday, reported hospital for location)  Following Commands: Follows one step commands with increased time  Safety Judgment: Decreased awareness of need for assistance  Insight: Moderate  Impulsive: Mildly           Home Living:  Type of Home: House  Lives With:  (dtr who works)  Home Adaptive Equipment:  (standard walker, shower chair, ? grab bars in shower)  Home Layout:  (3 SAM, flight to bed/bath)  Bathroom Shower/Tub: Walk-in shower  Bathroom Toilet: Standard   Prior Function:  Level of [place]: Needs assistance with ADLs, Needs assistance with homemaking (A sponge bathing dtr then pt reports stands in shower, ? historian, A dressing dtr)  Ambulatory Assistance:  (WhW)  Vocational: Retired  Leisure: enjoys baseball/ [employer]  Hand " Dominance: Right  IADL History:  IADL Comments: A IADLS family A driving  ADL:  Eating Assistance: Stand by (anticipated)  Grooming Assistance: Moderate (anticipated standing)  Bathing Assistance: Maximal (anticipated)  UE Dressing Assistance: Moderate  LE Dressing Assistance:  (total A tonja socks supine)  Toileting Assistance with Device:  (max A anticipated WHW)  Functional Deficit: Setup, Steadying, Verbal cueing, Supervision/safety, Increased time to complete  Activity Tolerance:  Endurance:  (fair)  Bed Mobility/Transfers: Bed Mobility  Bed Mobility:  (sup to sit min A sit to sup mod A)    Transfers  Transfer:  (2x sit to stand with extended rest break mod A WhW)         Sitting Balance:  Dynamic Sitting Balance  Dynamic Sitting-Level of Assistance: Contact guard  Standing Balance:  Dynamic Standing Balance  Dynamic Standing-Level of Assistance: Moderate assistance      IADL's:   IADL Comments: A IADLS family A driving  Vision: Vision - Basic Assessment  Current Vision:  (double vision)  Sensation:  Light Touch: No apparent deficits  Strength:  Strength Comments: RUE shoulder 4-/5 distal 3+/5, LUE 4-/5       Coordination:  Movements are Fluid and Coordinated: No   Hand Function:  Hand Function  Gross Grasp: Functional  Extremities: RUE   RUE : Within Functional Limits and LUE   LUE: Within Functional Limits      Outcome Measures: [facility] Daily Activity  Putting on and taking off regular lower body clothing: Total  Bathing (including washing, rinsing, drying): A lot  Putting on and taking off regular upper body clothing: A lot  Toileting, which includes using toilet, bedpan or urinal: A lot  Taking care of personal grooming such as brushing teeth: A lot  Eating Meals: A little  Daily Activity - Total Score: 12         and OT Adult Other Outcome Measures  4AT: +  Education Documentation  Body Mechanics, taught by Allison Zhang OT at 2/20/2025 11:49 AM.  Learner: Patient  Readiness: Acceptance  Method:  Explanation, Demonstration  Response: Verbalizes Understanding, Needs Reinforcement  Comment: benji marino    Precautions, taught by Allison Zhang OT at 2/20/2025 11:49 AM.  Learner: Patient  Readiness: Acceptance  Method: Explanation, Demonstration  Response: Verbalizes Understanding, Needs Reinforcement  Comment: benji marino    ADL Training, taught by Allison Zhang OT at 2/20/2025 11:49 AM.  Learner: Patient  Readiness: Acceptance  Method: Explanation, Demonstration  Response: Verbalizes Understanding, Needs Reinforcement  Comment: benji marino    Education Comments  No comments found.      Goals:   Encounter Problems       Encounter Problems (Active)       ADLs       Patient will perform UB and LB bathing  with minimal assist  level of assistance and ae. (Progressing)       Start:  02/20/25    Expected End:  03/13/25            Patient with complete upper body dressing with stand by assist level of assistance donning and doffing all UE clothes with PRN adaptive equipment  (Progressing)       Start:  02/20/25    Expected End:  03/13/25            Patient with complete lower body dressing with minimal assist  level of assistance donning and doffing all LE clothes  with PRN adaptive equipment (Progressing)       Start:  02/20/25    Expected End:  03/13/25            Patient will feed self with independent level of assistance  (Progressing)       Start:  02/20/25    Expected End:  03/13/25            Patient will complete daily grooming tasks  with independent level of assistance  (Progressing)       Start:  02/20/25    Expected End:  03/13/25            Patient will complete toileting including hygiene clothing management/hygiene with minimal assist  level of assistance and lrd. (Progressing)       Start:  02/20/25    Expected End:  03/13/25               COGNITION/SAFETY       Patient will score WFL on standardized cognitive assessment with min cues and within reasonable time frame (Progressing)       Start:  02/20/25     Expected End:  03/13/25               EXERCISE/STRENGTHENING       Patient with increase BUE to wfl strength. (Progressing)       Start:  02/20/25    Expected End:  03/13/25               MOBILITY       Patient will perform Functional mobility mod  Household distances/Community Distances with minimal assist  level of assistance and least restrictive device in order to improve safety and functional mobility. (Progressing)       Start:  02/20/25    Expected End:  03/13/25               TRANSFERS       Patient will perform bed mobility stand by assist level of assistance and bed rails in order to improve safety and independence with mobility (Progressing)       Start:  02/20/25    Expected End:  03/13/25            Patient will complete functional transfer least restrictive device with minimal assist  level of assistance. (Progressing)       Start:  02/20/25    Expected End:  03/13/25

## 2025-02-21 LAB
ALBUMIN SERPL BCP-MCNC: 3.7 G/DL (ref 3.4–5)
ANION GAP SERPL CALC-SCNC: 15 MMOL/L (ref 10–20)
BASOPHILS # BLD AUTO: 0.05 X10*3/UL (ref 0–0.1)
BASOPHILS NFR BLD AUTO: 0.9 %
BUN SERPL-MCNC: 9 MG/DL (ref 6–23)
CALCIUM SERPL-MCNC: 8.6 MG/DL (ref 8.6–10.6)
CHLORIDE SERPL-SCNC: 106 MMOL/L (ref 98–107)
CO2 SERPL-SCNC: 22 MMOL/L (ref 21–32)
CREAT SERPL-MCNC: 0.86 MG/DL (ref 0.5–1.3)
EGFRCR SERPLBLD CKD-EPI 2021: >90 ML/MIN/1.73M*2
EOSINOPHIL # BLD AUTO: 0.07 X10*3/UL (ref 0–0.7)
EOSINOPHIL NFR BLD AUTO: 1.3 %
ERYTHROCYTE [DISTWIDTH] IN BLOOD BY AUTOMATED COUNT: 15.3 % (ref 11.5–14.5)
GLUCOSE BLD MANUAL STRIP-MCNC: 105 MG/DL (ref 74–99)
GLUCOSE BLD MANUAL STRIP-MCNC: 119 MG/DL (ref 74–99)
GLUCOSE BLD MANUAL STRIP-MCNC: 64 MG/DL (ref 74–99)
GLUCOSE BLD MANUAL STRIP-MCNC: 68 MG/DL (ref 74–99)
GLUCOSE BLD MANUAL STRIP-MCNC: 70 MG/DL (ref 74–99)
GLUCOSE BLD MANUAL STRIP-MCNC: 98 MG/DL (ref 74–99)
GLUCOSE SERPL-MCNC: 55 MG/DL (ref 74–99)
HCT VFR BLD AUTO: 38.9 % (ref 41–52)
HGB BLD-MCNC: 13.1 G/DL (ref 13.5–17.5)
IMM GRANULOCYTES # BLD AUTO: 0.02 X10*3/UL (ref 0–0.7)
IMM GRANULOCYTES NFR BLD AUTO: 0.4 % (ref 0–0.9)
LYMPHOCYTES # BLD AUTO: 1.3 X10*3/UL (ref 1.2–4.8)
LYMPHOCYTES NFR BLD AUTO: 23.5 %
MAGNESIUM SERPL-MCNC: 1.84 MG/DL (ref 1.6–2.4)
MCH RBC QN AUTO: 33.2 PG (ref 26–34)
MCHC RBC AUTO-ENTMCNC: 33.7 G/DL (ref 32–36)
MCV RBC AUTO: 99 FL (ref 80–100)
MONOCYTES # BLD AUTO: 0.43 X10*3/UL (ref 0.1–1)
MONOCYTES NFR BLD AUTO: 7.8 %
NEUTROPHILS # BLD AUTO: 3.66 X10*3/UL (ref 1.2–7.7)
NEUTROPHILS NFR BLD AUTO: 66.1 %
NRBC BLD-RTO: 0 /100 WBCS (ref 0–0)
PHOSPHATE SERPL-MCNC: 3.2 MG/DL (ref 2.5–4.9)
PLATELET # BLD AUTO: 228 X10*3/UL (ref 150–450)
POTASSIUM SERPL-SCNC: 4.1 MMOL/L (ref 3.5–5.3)
RBC # BLD AUTO: 3.95 X10*6/UL (ref 4.5–5.9)
SODIUM SERPL-SCNC: 139 MMOL/L (ref 136–145)
WBC # BLD AUTO: 5.5 X10*3/UL (ref 4.4–11.3)

## 2025-02-21 PROCEDURE — 83735 ASSAY OF MAGNESIUM: CPT

## 2025-02-21 PROCEDURE — 97530 THERAPEUTIC ACTIVITIES: CPT | Mod: GP,CQ

## 2025-02-21 PROCEDURE — 2500000005 HC RX 250 GENERAL PHARMACY W/O HCPCS

## 2025-02-21 PROCEDURE — 36415 COLL VENOUS BLD VENIPUNCTURE: CPT

## 2025-02-21 PROCEDURE — 99232 SBSQ HOSP IP/OBS MODERATE 35: CPT

## 2025-02-21 PROCEDURE — 85025 COMPLETE CBC W/AUTO DIFF WBC: CPT

## 2025-02-21 PROCEDURE — 2500000001 HC RX 250 WO HCPCS SELF ADMINISTERED DRUGS (ALT 637 FOR MEDICARE OP)

## 2025-02-21 PROCEDURE — 80069 RENAL FUNCTION PANEL: CPT

## 2025-02-21 PROCEDURE — 2500000004 HC RX 250 GENERAL PHARMACY W/ HCPCS (ALT 636 FOR OP/ED)

## 2025-02-21 PROCEDURE — 82947 ASSAY GLUCOSE BLOOD QUANT: CPT

## 2025-02-21 PROCEDURE — 1200000002 HC GENERAL ROOM WITH TELEMETRY DAILY

## 2025-02-21 RX ORDER — DEXTROSE 50 % IN WATER (D50W) INTRAVENOUS SYRINGE
12.5
Status: DISCONTINUED | OUTPATIENT
Start: 2025-02-21 | End: 2025-02-25 | Stop reason: HOSPADM

## 2025-02-21 RX ORDER — DEXTROSE 50 % IN WATER (D50W) INTRAVENOUS SYRINGE
25
Status: DISCONTINUED | OUTPATIENT
Start: 2025-02-21 | End: 2025-02-25 | Stop reason: HOSPADM

## 2025-02-21 RX ORDER — DEXTROSE, SODIUM CHLORIDE, SODIUM LACTATE, POTASSIUM CHLORIDE, AND CALCIUM CHLORIDE 5; .6; .31; .03; .02 G/100ML; G/100ML; G/100ML; G/100ML; G/100ML
75 INJECTION, SOLUTION INTRAVENOUS CONTINUOUS
Status: DISCONTINUED | OUTPATIENT
Start: 2025-02-21 | End: 2025-02-23

## 2025-02-21 RX ADMIN — DEXTROSE MONOHYDRATE 12.5 G: 25 INJECTION, SOLUTION INTRAVENOUS at 10:29

## 2025-02-21 RX ADMIN — THIAMINE HYDROCHLORIDE 500 MG: 100 INJECTION, SOLUTION INTRAMUSCULAR; INTRAVENOUS at 14:27

## 2025-02-21 RX ADMIN — POLYETHYLENE GLYCOL 3350 17 G: 17 POWDER, FOR SOLUTION ORAL at 09:18

## 2025-02-21 RX ADMIN — ATORVASTATIN CALCIUM 40 MG: 40 TABLET, FILM COATED ORAL at 20:46

## 2025-02-21 RX ADMIN — ASPIRIN 81 MG: 81 TABLET, COATED ORAL at 09:19

## 2025-02-21 RX ADMIN — LACOSAMIDE 100 MG: 100 TABLET, FILM COATED ORAL at 20:46

## 2025-02-21 RX ADMIN — LISINOPRIL 20 MG: 20 TABLET ORAL at 09:19

## 2025-02-21 RX ADMIN — LEVETIRACETAM 1500 MG: 250 TABLET, FILM COATED ORAL at 22:29

## 2025-02-21 RX ADMIN — DEXTROSE MONOHYDRATE 12.5 G: 25 INJECTION, SOLUTION INTRAVENOUS at 16:52

## 2025-02-21 RX ADMIN — THIAMINE HYDROCHLORIDE 500 MG: 100 INJECTION, SOLUTION INTRAMUSCULAR; INTRAVENOUS at 09:19

## 2025-02-21 RX ADMIN — ENOXAPARIN SODIUM 40 MG: 40 INJECTION, SOLUTION SUBCUTANEOUS at 16:33

## 2025-02-21 RX ADMIN — LEVETIRACETAM 1500 MG: 250 TABLET, FILM COATED ORAL at 11:50

## 2025-02-21 RX ADMIN — CEFTRIAXONE SODIUM 1 G: 1 INJECTION, SOLUTION INTRAVENOUS at 14:30

## 2025-02-21 RX ADMIN — AMLODIPINE BESYLATE 10 MG: 10 TABLET ORAL at 09:19

## 2025-02-21 RX ADMIN — LACOSAMIDE 100 MG: 100 TABLET, FILM COATED ORAL at 09:18

## 2025-02-21 RX ADMIN — THIAMINE HYDROCHLORIDE 500 MG: 100 INJECTION, SOLUTION INTRAMUSCULAR; INTRAVENOUS at 20:46

## 2025-02-21 ASSESSMENT — PAIN SCALES - GENERAL
PAINLEVEL_OUTOF10: 0 - NO PAIN
PAINLEVEL_OUTOF10: 0 - NO PAIN

## 2025-02-21 ASSESSMENT — COGNITIVE AND FUNCTIONAL STATUS - GENERAL
TURNING FROM BACK TO SIDE WHILE IN FLAT BAD: A LITTLE
MOVING FROM LYING ON BACK TO SITTING ON SIDE OF FLAT BED WITH BEDRAILS: A LITTLE
TOILETING: A LITTLE
WALKING IN HOSPITAL ROOM: A LOT
DRESSING REGULAR UPPER BODY CLOTHING: A LITTLE
TURNING FROM BACK TO SIDE WHILE IN FLAT BAD: A LITTLE
HELP NEEDED FOR BATHING: A LITTLE
PERSONAL GROOMING: A LITTLE
MOBILITY SCORE: 12
DRESSING REGULAR LOWER BODY CLOTHING: A LITTLE
MOVING TO AND FROM BED TO CHAIR: A LOT
CLIMB 3 TO 5 STEPS WITH RAILING: TOTAL
STANDING UP FROM CHAIR USING ARMS: A LITTLE
WALKING IN HOSPITAL ROOM: TOTAL
DAILY ACTIVITIY SCORE: 19
STANDING UP FROM CHAIR USING ARMS: A LOT
MOBILITY SCORE: 17
MOVING TO AND FROM BED TO CHAIR: A LITTLE
CLIMB 3 TO 5 STEPS WITH RAILING: A LOT

## 2025-02-21 ASSESSMENT — PAIN - FUNCTIONAL ASSESSMENT
PAIN_FUNCTIONAL_ASSESSMENT: 0-10
PAIN_FUNCTIONAL_ASSESSMENT: 0-10

## 2025-02-21 NOTE — DOCUMENTATION CLARIFICATION NOTE
"    PATIENT:               JESSICA KELLEY  ACCT #:                  [id number]  MRN:                       [id number]  :                       1960  ADMIT DATE:       2025 11:51 AM  DISCH DATE:  RESPONDING PROVIDER #:        [id number]          PROVIDER RESPONSE TEXT:    Acute Metabolic Encephalopathy 2/2 UTI    CDI QUERY TEXT:    Clarification    Instruction:    Based on your assessment of the patient and the clinical information, please provide the requested documentation by clicking on the appropriate radio button and enter any additional information if prompted.    Question: Please further clarify the type of Encephalopathy as    When answering this query, please exercise your independent professional judgment. The fact that a question is being asked, does not imply that any particular answer is desired or expected.    The patient's clinical indicators include:  Clinical Information:  Patient is a 64 year old male who presented as a BAT for seizures with right sided weakness.    Clinical Indicators:  -From the Significant Event Note on , \" CT head: left occipital and right parietal/occipital lobe encephalomalacia \"    -Blood gases on  show, pH 7.26, pCO2 46, pO2 58, lactate 3.8, base excess -6.5 and bicarb 20.6    -From the ED Provider note, \" VBG showed acidosis at 7.26, elevated lactate 3.8, bicarb 20.6 and normal anion gap of 13 \"    -From the Significant Event note on , \" This vEEG is indicative of a moderate diffuse encephalopathy \"    -On , UA shows positive leukocytesterase    -From the PN on , \" UTI, UA: , WBC 21-50, Started on CTX  for 5 days \"    Treatment:  -Rocephin 1gm IVPB every 24 hours ordered on  for urinary tract infection  -LR bolus 1 liter IV over 1 hour x 1 dose  -Safety precautions    Risk Factors:  Acidosis, abnormal UA, encephalopathy, encephalomalacia, UTI, abnormal VBG  Options provided:  -- Acute Metabolic Encephalopathy 2/2 Encephalomalacia  -- " Acute Metabolic Encephalopathy 2/2 Metabolic Acidosis  -- Acute Metabolic Encephalopathy 2/2 UTI  -- Other - I will add my own diagnosis  -- Refer to Clinical Documentation Reviewer    Query created by: Kaitlin Zhou on 2/21/2025 12:25 PM      Electronically signed by:  ADINA CADENA MD 2/21/2025 3:10 PM

## 2025-02-21 NOTE — CARE PLAN
The patient's goals for the shift include      The clinical goals for the shift include Pt to remain safe and stable through shift    Problem: Pain - Adult  Goal: Verbalizes/displays adequate comfort level or baseline comfort level  Outcome: Progressing     Problem: Safety - Adult  Goal: Free from fall injury  Outcome: Progressing

## 2025-02-21 NOTE — PROGRESS NOTES
"Josiah James is a 64 y.o. male on day 3 of admission presenting with Seizure (Multi).    Subjective   Patient was upset this morning regarding possibly going to SNF after hospitalization. He stated that he did not have a seizure and was dragged to the hospital by a nurse. He does not recall ever being diagnosed with seizures in the past despite prior admission in 11/2024 where he was in status.    PT/OT recommended SNF vs low intensity care with 24 hour assist. When speaking with the daughter, she works long shifts and cannot watch over him during the day. When asked why he does not want to go to rehab, he stated that his main concern is his daughter trying to take over his house. Later in the morning BG was 55, patient had refused to eat due to not feeling hungry. Was given D50.    Objective   Last Recorded Vitals  Blood pressure (!) 161/91, pulse 78, temperature 36.3 °C (97.3 °F), temperature source Temporal, resp. rate 19, SpO2 96%.    Physical Exam  Neurological Exam:  MENTAL STATUS:  General appearance: Awake, alert, upset  Orientation: Oriented to person, hospital (\"not [facility]\"), month Nov, year 2022  Unable to spell \"world\" backwards  Unable to perform serial 7s  Able to point to objects in extrapersonal space    CRANIAL NERVES:  - II:  R HH present   - III, IV, VI: TRACEY, EOMI to pursuit without nystagmus  - V: V1-V3 sensation intact bilaterally  - VII: Face muscles symmetric with smile and eye closure  - VIII: Intact to loud voice  - IX, X: Palate elevated symmetrically bilaterally, no hoarseness  - XI: 5/5 strength on shoulder shrugging bilaterally  - XII: Tongue midline without atrophy or fasciculation    MOTOR: Increased tone in LUE.     STRENGTH: R L  Deltoid  5 5-  Biceps  5 4+  Triceps  5 5    5 5  Quadriceps 4 3  Hamstrings 5 5  DorsiFlex 5          4  PlantarFlex 5 5    COORDINATION: Intact on finger to nose bl,     SENSORY: Intact to light touch    ROMBERG: Deferred     Neurological " Exam  Medications:  Current Outpatient Medications   Medication Instructions    acetaminophen (TYLENOL) 1,000 mg, oral, Every 6 hours PRN    amLODIPine (NORVASC) 10 mg, Daily    aspirin 81 mg, Daily    atorvastatin (LIPITOR) 40 mg, oral, Nightly    gabapentin (NEURONTIN) 100 mg, oral, Daily PRN    lacosamide (VIMPAT) 100 mg, oral, Every 12 hours scheduled    levETIRAcetam (KEPPRA) 1,500 mg, oral, 2 times daily    lisinopril 20 mg, oral, Daily    mirtazapine (REMERON) 7.5 mg, Nightly    pantoprazole (PROTONIX) 40 mg, oral, Daily before breakfast, Do not crush, chew, or split.     Recent Labs:  Results for orders placed or performed during the hospital encounter of 02/18/25 (from the past 24 hours)   POCT GLUCOSE   Result Value Ref Range    POCT Glucose 74 74 - 99 mg/dL   CBC and Auto Differential   Result Value Ref Range    WBC 5.5 4.4 - 11.3 x10*3/uL    nRBC 0.0 0.0 - 0.0 /100 WBCs    RBC 3.95 (L) 4.50 - 5.90 x10*6/uL    Hemoglobin 13.1 (L) 13.5 - 17.5 g/dL    Hematocrit 38.9 (L) 41.0 - 52.0 %    MCV 99 80 - 100 fL    MCH 33.2 26.0 - 34.0 pg    MCHC 33.7 32.0 - 36.0 g/dL    RDW 15.3 (H) 11.5 - 14.5 %    Platelets 228 150 - 450 x10*3/uL    Neutrophils % 66.1 40.0 - 80.0 %    Immature Granulocytes %, Automated 0.4 0.0 - 0.9 %    Lymphocytes % 23.5 13.0 - 44.0 %    Monocytes % 7.8 2.0 - 10.0 %    Eosinophils % 1.3 0.0 - 6.0 %    Basophils % 0.9 0.0 - 2.0 %    Neutrophils Absolute 3.66 1.20 - 7.70 x10*3/uL    Immature Granulocytes Absolute, Automated 0.02 0.00 - 0.70 x10*3/uL    Lymphocytes Absolute 1.30 1.20 - 4.80 x10*3/uL    Monocytes Absolute 0.43 0.10 - 1.00 x10*3/uL    Eosinophils Absolute 0.07 0.00 - 0.70 x10*3/uL    Basophils Absolute 0.05 0.00 - 0.10 x10*3/uL     Images:   Transthoracic Echo (TTE) Complete    Result Date: 2/19/2025   [facility], [address] [place] [address]                Tel [phone] and Fax [phone] TRANSTHORACIC ECHOCARDIOGRAM REPORT  Patient Name:        JESSICA Mccall Physician:    [id number] Misael Oswald MD Study Date:         2/19/2025           Ordering Provider:    [id number] REILLY OGLESBY MRN/PID:            [id number]            Fellow: Accession#:         [id number]        Nurse:                Hui Patiño RN Date of Birth/Age:  1960 / 64     Sonographer:          GERMAN Landry RDCS Gender assigned at                     Additional Staff:     Jenniffer Jaramillo Birth:                                                        Intern Height:             172.72 cm           Admit Date: Weight:             77.11 kg            Admission Status:     Inpatient -                                                               Routine BSA / BMI:          1.91 m2 / 25.85     Encounter#:           [id number]                     kg/m2 Blood Pressure:     145/96 mmHg         Department Location:  [facility]                                                               Non Invasive Study Type:    TRANSTHORACIC ECHO (TTE) COMPLETE Diagnosis/ICD: Cardiomegaly-I51.7 Indication:    cardiomegaly CPT Code:      Echo Complete w Full Doppler-[id number] Patient History: Pertinent History: Cardiomegaly, HTN, stroke, seizures. Study Detail: The following Echo studies were performed: 2D, M-Mode, Doppler and               color flow. Definity used as a contrast agent for endocardial               border definition. Total contrast used for this procedure was 2 mL               via IV push.  PHYSICIAN INTERPRETATION: Left Ventricle: Left ventricular ejection fraction is normal, calculated by Charles's biplane at 70%. There are no regional left ventricular wall motion abnormalities. The left ventricular cavity size is normal. There is normal septal and normal posterior left ventricular  wall thickness. Left ventricular diastolic filling is indeterminate. Left Atrium: The left atrial size is normal. Right Ventricle: The right ventricle is normal in size. There is normal right ventricular global systolic function. Right Atrium: The right atrium is normal in size. Aortic Valve: The aortic valve is probably trileaflet. There is moderate aortic valve cusp calcification. There is reduced aortic valve right coronary cusp excursion. There is evidence of mild aortic valve stenosis. The aortic valve dimensionless index is 0.60. There is trace aortic valve regurgitation. The peak instantaneous gradient of the aortic valve is 18 mmHg. The mean gradient of the aortic valve is 10 mmHg. Mitral Valve: The mitral valve is normal in structure. There is trace mitral valve regurgitation. Tricuspid Valve: The tricuspid valve is structurally normal. There is trace tricuspid regurgitation. Pulmonic Valve: The pulmonic valve is not well visualized. There is trace pulmonic valve regurgitation. Pericardium: Trivial pericardial effusion. Aorta: The aortic root was not well visualized. The aorta was not well visualized. There is upper limits of normal dilatation of the ascending aorta. The aortic root is at the upper limits of normal size. Systemic Veins: The inferior vena cava appears normal in size. In comparison to the previous echocardiogram(s): There are no prior studies on this patient for comparison purposes.  CONCLUSIONS:  1. Left ventricular ejection fraction is normal, calculated by Charles's biplane at 70%.  2. There is normal right ventricular global systolic function.  3. There is moderate aortic valve cusp calcification (right coronary cusp).  4. Mild aortic valve stenosis. RECOMMENDATIONS: Utilizing an FDA cleared automated machine learning algorithm (EchoGo Heart Failure by [employer]), the analysis of the apical 4-chamber echocardiogram suggests the presence of heart failure with preserved ejection fraction  (HFpEF)*. Clinical correlation looking for additional heart failure signs and symptoms is recommended, as a definite diagnosis of heart failure cannot be made by imaging alone. *Per ACC/AHA/HFSA universal diagnosis of heart failure, HFpEF is defined as 1) signs and symptoms leading to clinical diagnosis of heart failure, 2) an ejection fraction of at least 50%, and 3) evidence of elevated intra-cardiac filling pressures by echocardiography, BNP elevation, or catheterization.  QUANTITATIVE DATA SUMMARY:  2D MEASUREMENTS:          Normal Ranges: Ao Root d:       3.60 cm  (2.0-3.7cm) LAs:             3.20 cm  (2.7-4.0cm) IVSd:            0.70 cm  (0.6-1.1cm) LVPWd:           0.80 cm  (0.6-1.1cm) LVIDd:           4.00 cm  (3.9-5.9cm) LVIDs:           2.40 cm LV Mass Index:   45 g/m2 LVEDV Index:     65 ml/m2 LV % FS          40.0 %  LEFT ATRIUM:                  Normal Ranges: LA Vol A4C:        46.3 ml    (22+/-6mL/m2) LA Vol A2C:        53.1 ml LA Vol BP:         50.4 ml LA Vol Index A4C:  24.3ml/m2 LA Vol Index A2C:  27.8 ml/m2 LA Vol Index BP:   26.4 ml/m2 LA Area A4C:       15.6 cm2 LA Area A2C:       17.0 cm2 LA Major Axis A4C: 4.5 cm LA Major Axis A2C: 4.6 cm LA Volume Index:   26.4 ml/m2  RIGHT ATRIUM:          Normal Ranges: RA Area A4C:  16.0 cm2  AORTA MEASUREMENTS:         Normal Ranges: Ao Sinus, d:        3.60 cm (2.1-3.5cm) Asc Ao, d:          3.30 cm (2.1-3.4cm)  LV SYSTOLIC FUNCTION:                      Normal Ranges: EF-A4C View:    72 % (>=55%) EF-A2C View:    65 % EF-Biplane:     70 % LV EF Reported: 70 %  LV DIASTOLIC FUNCTION:             Normal Ranges: MV Peak E:             0.59 m/s    (0.7-1.2 m/s) MV Peak A:             0.90 m/s    (0.42-0.7 m/s) E/A Ratio:             0.65        (1.0-2.2) MV e'                  0.053 m/s   (>8.0) MV lateral e'          0.07 m/s MV medial e'           0.03 m/s MV A Dur:              127.00 msec E/e' Ratio:            11.10       (<8.0) MV DT:                  344 msec    (150-240 msec)  MITRAL VALVE:          Normal Ranges: MV DT:        344 msec (150-240msec)  AORTIC VALVE:                      Normal Ranges: AoV Vmax:                2.14 m/s  (<=1.7m/s) AoV Peak P.3 mmHg (<20mmHg) AoV Mean PG:             10.0 mmHg (1.7-11.5mmHg) LVOT Max Han:            1.20 m/s  (<=1.1m/s) AoV VTI:                 39.90 cm  (18-25cm) LVOT VTI:                24.00 cm LVOT Diameter:           2.20 cm   (1.8-2.4cm) AoV Area, VTI:           2.29 cm2  (2.5-5.5cm2) AoV Area,Vmax:           2.13 cm2  (2.5-4.5cm2) AoV Dimensionless Index: 0.60  RIGHT VENTRICLE: RV Basal 4.70 cm RV Mid   3.30 cm TAPSE:   22.7 mm RV s'    0.19 m/s  TRICUSPID VALVE/RVSP:         Normal Ranges: IVC Diam:             1.70 cm  PULMONIC VALVE:          Normal Ranges: PV Accel Time:  66 msec  (>120ms) PV Max Han:     0.9 m/s  (0.6-0.9m/s) PV Max PG:      3.4 mmHg  [id number] Misael Oswald MD Electronically signed on 2025 at 2:13:42 PM  ** Final **      Troutman Coma Scale  Best Eye Response: Spontaneous  Best Verbal Response: Oriented  Best Motor Response: Follows commands  Allan Coma Scale Score: 15        Assessment/Plan      Assessment & Plan  Seizure (Multi)    Cardiomegaly    Jsoiah James is a 64 y.o. male with PMHx of HTN, R parietal, left occipital strokes (, unknown etiology) c/b epilepsy (2024). Cervical myelopathy s/p C3-T4 laminectomy c/b T2 epidural abscess () who presented as a BAT for seizures with right sided weakness. CTH/CTA was without acute findings. His right flaccidity improved with later examinations and he has mild left gaze preference now which is likely post ictal. Keppra level <2. He is s/p keppra 3.5g and 2mg Ativan and 5mg Versed. Currently now back on his home LEV 1.5g BID, LAC 100mg BID. Seizures likely caused by medication non-compliance and UTI.      4D Classification of the Paroxysmal Episodes:  Epileptic  Epileptogenic Zone: Right hemisphere  Semiology: R  Versive > RUE, Rt face tonic > GTC  Frequency: 2x 11/2024, 2x 2/2025  Localizing Signs: post ictal Rt Todds, Aphasia  Diagnostic Signs: Tongue bite  Etiology: Stroke  Co-morbidities: Cognitive impairment, non compliance  Previous AEDs: NA  Current AEDs: Keppra 1.5g BID, Lacosamide 100mg BID.     Updates 2/21:  - PT/OT recommended SNF, would require 24 hour assist if low intensity. Daughter works 14 hours a day and would not be able to provide 24h assist.   - SLP moderate cognitive impairment  - Dispo: Accepted to [facility] pending precert     #Breakthrough seizures likely 2/2 UTI and med non-compliance  :: Routine EEG 2/18: Moderate diffuse encephalopathy   - Continue keppra 1.5mg BID  - Continue Vimpat 100mg BID  - Start Thiamine 500mg TID for 3 days then 100 mg daily  - 2mg Ativan IV PRN for generalzied convulsions lasting >3-5 minutes.    #UTI   :: UA: , WBC 21-50   - Started on CTX 2/19  - Ucx pending    #HTN  :: Clinically he is on RA  :: Pocus ultrasound of the chest showed small pericardial effusion, collapsible IVC  - Home meds: lisinopril 20mg, amlodipine 10mg daily  - Hydralazine/labetalol PRN for SBP > 180  - TTE: LVEF 70%, mod aortic valve cusp calcification, mild AV stenosis     #Hx of R parietal and L occipital strokes in 2022  - C/w home aspirin 81, atorvastatin 40mg      #GERD  - C/w home protonix     Diet: Regular  DVT ppx: Lovenox  Code status: DNR, ok for intubation(discussed with his daughter, who reported that these are his past wishes which she agrees with).   NOK: Daughter Cecilia James [phone]     Martha Parker MD  Neurology, PGY-2

## 2025-02-21 NOTE — PROGRESS NOTES
Physical Therapy    Physical Therapy Treatment    Patient Name: Josiah James  MRN: [id number]  Department: [place] [address]  Room: [address] [place] [address]  Today's Date: 2/21/2025  Time Calculation  Start Time: 0915  Stop Time: 0939  Time Calculation (min): 24 min         Assessment/Plan   PT Assessment  PT Assessment Results: Decreased strength, Decreased range of motion, Decreased endurance, Impaired balance, Decreased mobility, Decreased coordination, Impaired judgement, Decreased safety awareness  Rehab Prognosis: Good  Barriers to Discharge Home: Caregiver assistance, Physical needs  Caregiver Assistance: Patient lives alone and/or does not have reliable caregiver assistance  Physical Needs: 24hr mobility assistance needed  Evaluation/Treatment Tolerance: Patient tolerated treatment well  Medical Staff Made Aware: Yes  End of Session Communication: Bedside nurse  Assessment Comment: Pt offers good effort with therapy. Agreeable to transfer training this date, able to complete sit-stand with modA, requires maxA for side stepping vs marching in place due to R LE ataxia and impaired balance. Remains appropriate for mod intensity therapy  End of Session Patient Position: Bed, 3 rail up, Alarm on     PT Plan  Treatment/Interventions: Bed mobility, Transfer training, Balance training, Strengthening, Therapeutic exercise, Therapeutic activity  PT Plan: Ongoing PT  PT Frequency: 3 times per week  PT Discharge Recommendations: Moderate intensity level of continued care  PT Recommended Transfer Status: Assist x1  PT - OK to Discharge: Yes      General Visit Information:   PT  Visit  PT Received On: 02/21/25  Response to Previous Treatment: Patient with no complaints from previous session.  General  Prior to Session Communication: Bedside nurse  Patient Position Received: Bed, 3 rail up, Alarm on  General Comment: Pt supine in bed, agreeable to therapy.    Subjective   Precautions:  Precautions  Hearing/Visual Limitations: impaired vision L  side  Medical Precautions: Fall precautions, Seizure precautions    Objective   Pain:  Pain Assessment  Pain Assessment: 0-10  0-10 (Numeric) Pain Score: 0 - No pain  Cognition:  Cognition  Overall Cognitive Status: Within Functional Limits    Treatments:  Therapeutic Exercise  Therapeutic Exercise Performed: Yes  Therapeutic Exercise Activity 1: Seated LAQs with limited knee ext noted, Brenda to increased ROM, increased tone noted, L LE heel slides 1x10    Bed Mobility  Bed Mobility: Yes  Bed Mobility 1  Bed Mobility 1: Supine to sitting, Sitting to supine  Level of Assistance 1: Contact guard  Bed Mobility Comments 1: mod pt labor noted    Ambulation/Gait Training  Ambulation/Gait Training Performed: Yes  Ambulation/Gait Training 1  Surface 1: Level tile  Device 1: Rolling walker  Assistance 1: Maximum assistance  Quality of Gait 1: Ataxic  Comments/Distance (ft) 1: Alt LE marching 3x/LE, side stepping 2 steps x2  Transfers  Transfer: Yes  Transfer 1  Transfer From 1: Sit to, Stand to  Transfer to 1: Sit  Technique 1: Sit to stand, Stand to sit  Transfer Device 1: Walker  Transfer Level of Assistance 1: Moderate assistance, Maximum verbal cues  Trials/Comments 1: 2x from EOB, max cues for UE placement and ant weight shift    Outcome Measures:     [facility] Basic Mobility  Turning from your back to your side while in a flat bed without using bedrails: A little  Moving from lying on your back to sitting on the side of a flat bed without using bedrails: A little  Moving to and from bed to chair (including a wheelchair): A lot  Standing up from a chair using your arms (e.g. wheelchair or bedside chair): A lot  To walk in hospital room: Total  Climbing 3-5 steps with railing: Total  Basic Mobility - Total Score: 12    Education Documentation  Precautions, taught by Stephanie Blancas PTA at 2/21/2025 10:02 AM.  Learner: Patient  Readiness: Acceptance  Method: Explanation, Demonstration  Response: Verbalizes Understanding,  Needs Reinforcement  Comment: precautions, safe mobility    Mobility Training, taught by Stephanie Blancas PTA at 2/21/2025 10:02 AM.  Learner: Patient  Readiness: Acceptance  Method: Explanation, Demonstration  Response: Verbalizes Understanding, Needs Reinforcement  Comment: precautions, safe mobility    Education Comments  No comments found.        OP EDUCATION:       Encounter Problems       Encounter Problems (Active)       PT Transfers       STG - Transfer from bed to chair min assist (Progressing)       Start:  02/20/25    Expected End:  03/06/25            STG - Patient to transfer to and from sit to supine CGA (Progressing)       Start:  02/20/25    Expected End:  03/06/25            STG - Patient will transfer sit to and from stand min assist (Progressing)       Start:  02/20/25    Expected End:  03/06/25               Pain - Adult            LAMBERTO Lara

## 2025-02-22 LAB
ALBUMIN SERPL BCP-MCNC: 3.8 G/DL (ref 3.4–5)
ANION GAP SERPL CALC-SCNC: 13 MMOL/L (ref 10–20)
BACTERIA UR CULT: ABNORMAL
BACTERIA UR CULT: ABNORMAL
BASOPHILS # BLD AUTO: 0.07 X10*3/UL (ref 0–0.1)
BASOPHILS NFR BLD AUTO: 1.3 %
BUN SERPL-MCNC: 8 MG/DL (ref 6–23)
CALCIUM SERPL-MCNC: 9 MG/DL (ref 8.6–10.6)
CHLORIDE SERPL-SCNC: 106 MMOL/L (ref 98–107)
CO2 SERPL-SCNC: 27 MMOL/L (ref 21–32)
CREAT SERPL-MCNC: 0.85 MG/DL (ref 0.5–1.3)
EGFRCR SERPLBLD CKD-EPI 2021: >90 ML/MIN/1.73M*2
EOSINOPHIL # BLD AUTO: 0.11 X10*3/UL (ref 0–0.7)
EOSINOPHIL NFR BLD AUTO: 2.1 %
ERYTHROCYTE [DISTWIDTH] IN BLOOD BY AUTOMATED COUNT: 15.1 % (ref 11.5–14.5)
GLUCOSE BLD MANUAL STRIP-MCNC: 103 MG/DL (ref 74–99)
GLUCOSE BLD MANUAL STRIP-MCNC: 114 MG/DL (ref 74–99)
GLUCOSE BLD MANUAL STRIP-MCNC: 127 MG/DL (ref 74–99)
GLUCOSE BLD MANUAL STRIP-MCNC: 135 MG/DL (ref 74–99)
GLUCOSE BLD MANUAL STRIP-MCNC: 80 MG/DL (ref 74–99)
GLUCOSE BLD MANUAL STRIP-MCNC: 90 MG/DL (ref 74–99)
GLUCOSE SERPL-MCNC: 82 MG/DL (ref 74–99)
HCT VFR BLD AUTO: 40.1 % (ref 41–52)
HGB BLD-MCNC: 13.8 G/DL (ref 13.5–17.5)
IMM GRANULOCYTES # BLD AUTO: 0.01 X10*3/UL (ref 0–0.7)
IMM GRANULOCYTES NFR BLD AUTO: 0.2 % (ref 0–0.9)
LYMPHOCYTES # BLD AUTO: 1.24 X10*3/UL (ref 1.2–4.8)
LYMPHOCYTES NFR BLD AUTO: 23.9 %
MAGNESIUM SERPL-MCNC: 1.87 MG/DL (ref 1.6–2.4)
MCH RBC QN AUTO: 33.3 PG (ref 26–34)
MCHC RBC AUTO-ENTMCNC: 34.4 G/DL (ref 32–36)
MCV RBC AUTO: 97 FL (ref 80–100)
MONOCYTES # BLD AUTO: 0.41 X10*3/UL (ref 0.1–1)
MONOCYTES NFR BLD AUTO: 7.9 %
NEUTROPHILS # BLD AUTO: 3.35 X10*3/UL (ref 1.2–7.7)
NEUTROPHILS NFR BLD AUTO: 64.6 %
NRBC BLD-RTO: 0 /100 WBCS (ref 0–0)
PHOSPHATE SERPL-MCNC: 2.4 MG/DL (ref 2.5–4.9)
PLATELET # BLD AUTO: 235 X10*3/UL (ref 150–450)
POTASSIUM SERPL-SCNC: 3.7 MMOL/L (ref 3.5–5.3)
RBC # BLD AUTO: 4.14 X10*6/UL (ref 4.5–5.9)
SODIUM SERPL-SCNC: 142 MMOL/L (ref 136–145)
WBC # BLD AUTO: 5.2 X10*3/UL (ref 4.4–11.3)

## 2025-02-22 PROCEDURE — 2500000004 HC RX 250 GENERAL PHARMACY W/ HCPCS (ALT 636 FOR OP/ED)

## 2025-02-22 PROCEDURE — 2500000001 HC RX 250 WO HCPCS SELF ADMINISTERED DRUGS (ALT 637 FOR MEDICARE OP)

## 2025-02-22 PROCEDURE — 2500000005 HC RX 250 GENERAL PHARMACY W/O HCPCS

## 2025-02-22 PROCEDURE — 80069 RENAL FUNCTION PANEL: CPT

## 2025-02-22 PROCEDURE — 36415 COLL VENOUS BLD VENIPUNCTURE: CPT

## 2025-02-22 PROCEDURE — 85025 COMPLETE CBC W/AUTO DIFF WBC: CPT

## 2025-02-22 PROCEDURE — 82947 ASSAY GLUCOSE BLOOD QUANT: CPT

## 2025-02-22 PROCEDURE — 2500000002 HC RX 250 W HCPCS SELF ADMINISTERED DRUGS (ALT 637 FOR MEDICARE OP, ALT 636 FOR OP/ED): Performed by: STUDENT IN AN ORGANIZED HEALTH CARE EDUCATION/TRAINING PROGRAM

## 2025-02-22 PROCEDURE — 99231 SBSQ HOSP IP/OBS SF/LOW 25: CPT

## 2025-02-22 PROCEDURE — 83735 ASSAY OF MAGNESIUM: CPT

## 2025-02-22 PROCEDURE — 1100000001 HC PRIVATE ROOM DAILY

## 2025-02-22 PROCEDURE — 2500000002 HC RX 250 W HCPCS SELF ADMINISTERED DRUGS (ALT 637 FOR MEDICARE OP, ALT 636 FOR OP/ED)

## 2025-02-22 RX ORDER — NITROFURANTOIN 25; 75 MG/1; MG/1
100 CAPSULE ORAL EVERY 12 HOURS SCHEDULED
Status: DISCONTINUED | OUTPATIENT
Start: 2025-02-22 | End: 2025-02-22

## 2025-02-22 RX ORDER — MAGNESIUM SULFATE HEPTAHYDRATE 40 MG/ML
2 INJECTION, SOLUTION INTRAVENOUS ONCE
Status: COMPLETED | OUTPATIENT
Start: 2025-02-22 | End: 2025-02-22

## 2025-02-22 RX ORDER — NITROFURANTOIN 25; 75 MG/1; MG/1
100 CAPSULE ORAL EVERY 12 HOURS SCHEDULED
Status: DISCONTINUED | OUTPATIENT
Start: 2025-02-23 | End: 2025-02-25 | Stop reason: HOSPADM

## 2025-02-22 RX ORDER — SULFAMETHOXAZOLE AND TRIMETHOPRIM 800; 160 MG/1; MG/1
1 TABLET ORAL EVERY 12 HOURS SCHEDULED
Status: DISCONTINUED | OUTPATIENT
Start: 2025-02-22 | End: 2025-02-22

## 2025-02-22 RX ADMIN — AMLODIPINE BESYLATE 10 MG: 10 TABLET ORAL at 08:48

## 2025-02-22 RX ADMIN — LEVETIRACETAM 1500 MG: 250 TABLET, FILM COATED ORAL at 12:36

## 2025-02-22 RX ADMIN — PANTOPRAZOLE SODIUM 40 MG: 40 TABLET, DELAYED RELEASE ORAL at 06:15

## 2025-02-22 RX ADMIN — THIAMINE HYDROCHLORIDE 500 MG: 100 INJECTION, SOLUTION INTRAMUSCULAR; INTRAVENOUS at 21:21

## 2025-02-22 RX ADMIN — ASPIRIN 81 MG: 81 TABLET, COATED ORAL at 08:48

## 2025-02-22 RX ADMIN — LACOSAMIDE 100 MG: 100 TABLET, FILM COATED ORAL at 08:43

## 2025-02-22 RX ADMIN — THIAMINE HYDROCHLORIDE 500 MG: 100 INJECTION, SOLUTION INTRAMUSCULAR; INTRAVENOUS at 16:24

## 2025-02-22 RX ADMIN — MAGNESIUM SULFATE HEPTAHYDRATE 2 G: 40 INJECTION, SOLUTION INTRAVENOUS at 12:33

## 2025-02-22 RX ADMIN — LACOSAMIDE 100 MG: 100 TABLET, FILM COATED ORAL at 21:21

## 2025-02-22 RX ADMIN — ATORVASTATIN CALCIUM 40 MG: 40 TABLET, FILM COATED ORAL at 21:21

## 2025-02-22 RX ADMIN — LISINOPRIL 20 MG: 20 TABLET ORAL at 08:48

## 2025-02-22 RX ADMIN — SODIUM CHLORIDE, SODIUM LACTATE, POTASSIUM CHLORIDE, CALCIUM CHLORIDE AND DEXTROSE MONOHYDRATE 75 ML/HR: 5; 600; 310; 30; 20 INJECTION, SOLUTION INTRAVENOUS at 02:59

## 2025-02-22 RX ADMIN — ENOXAPARIN SODIUM 40 MG: 40 INJECTION, SOLUTION SUBCUTANEOUS at 16:25

## 2025-02-22 RX ADMIN — SULFAMETHOXAZOLE AND TRIMETHOPRIM 1 TABLET: 800; 160 TABLET ORAL at 12:32

## 2025-02-22 RX ADMIN — POLYETHYLENE GLYCOL 3350 17 G: 17 POWDER, FOR SOLUTION ORAL at 08:48

## 2025-02-22 RX ADMIN — THIAMINE HYDROCHLORIDE 500 MG: 100 INJECTION, SOLUTION INTRAMUSCULAR; INTRAVENOUS at 08:48

## 2025-02-22 RX ADMIN — POTASSIUM PHOSPHATE, MONOBASIC AND POTASSIUM PHOSPHATE, DIBASIC 15 MMOL: 224; 236 INJECTION, SOLUTION, CONCENTRATE INTRAVENOUS at 15:01

## 2025-02-22 RX ADMIN — NITROFURANTOIN MONOHYDRATE/MACROCRYSTALS 100 MG: 25; 75 CAPSULE ORAL at 21:21

## 2025-02-22 ASSESSMENT — COGNITIVE AND FUNCTIONAL STATUS - GENERAL
MOVING TO AND FROM BED TO CHAIR: A LITTLE
PERSONAL GROOMING: A LITTLE
TURNING FROM BACK TO SIDE WHILE IN FLAT BAD: A LITTLE
STANDING UP FROM CHAIR USING ARMS: A LITTLE
WALKING IN HOSPITAL ROOM: A LOT
STANDING UP FROM CHAIR USING ARMS: A LITTLE
DRESSING REGULAR UPPER BODY CLOTHING: A LITTLE
HELP NEEDED FOR BATHING: A LITTLE
PERSONAL GROOMING: A LITTLE
DRESSING REGULAR UPPER BODY CLOTHING: A LITTLE
HELP NEEDED FOR BATHING: A LITTLE
DRESSING REGULAR LOWER BODY CLOTHING: A LITTLE
CLIMB 3 TO 5 STEPS WITH RAILING: A LOT
DRESSING REGULAR LOWER BODY CLOTHING: A LITTLE
CLIMB 3 TO 5 STEPS WITH RAILING: A LOT
MOBILITY SCORE: 17
DAILY ACTIVITIY SCORE: 19
DAILY ACTIVITIY SCORE: 19
TURNING FROM BACK TO SIDE WHILE IN FLAT BAD: A LITTLE
TOILETING: A LITTLE
TOILETING: A LITTLE
WALKING IN HOSPITAL ROOM: A LOT
MOVING TO AND FROM BED TO CHAIR: A LITTLE
MOBILITY SCORE: 17

## 2025-02-22 ASSESSMENT — PAIN SCALES - GENERAL: PAINLEVEL_OUTOF10: 0 - NO PAIN

## 2025-02-22 ASSESSMENT — PAIN - FUNCTIONAL ASSESSMENT: PAIN_FUNCTIONAL_ASSESSMENT: 0-10

## 2025-02-22 ASSESSMENT — PAIN SCALES - WONG BAKER: WONGBAKER_NUMERICALRESPONSE: NO HURT

## 2025-02-22 NOTE — CARE PLAN
The patient's goals for the shift include      The clinical goals for the shift include pt will remain safe    Problem: Pain - Adult  Goal: Verbalizes/displays adequate comfort level or baseline comfort level  Outcome: Progressing     Problem: Safety - Adult  Goal: Free from fall injury  Outcome: Progressing

## 2025-02-22 NOTE — PROGRESS NOTES
"Josiah James is a 64 y.o. male on day 4 of admission presenting with Seizure (Multi).    Subjective   Yesterday, patient refused to eat, stating that he was not hungry. Due to this he was hypoglycemic and received D50 injections x2 with improvement to his BS. He was started on D5LR 75cc/hr yesterday evening as he continued to refuse food.     This morning, patient was much more agreeable with eating today and would be willing to order food. Otherwise denies any pain, discomfort at this time. He had expressed to the nurse that he did not want to be DNR and would like his code status changed. However, when asked again this morning regarding his, he stated that he wished to be DNR/DNI. Due to difficulties in stating his code status consistently, will continue to have him as DNR ok for intubation for now, as discussed with his daughter on admission.     Objective   Last Recorded Vitals  Blood pressure (!) 164/105, pulse 73, temperature 36 °C (96.8 °F), resp. rate 23, SpO2 95%.    Physical Exam  Neurological Exam:  MENTAL STATUS:  General appearance: Awake, alert, upset  Orientation: Oriented to person, hospital (\"[facility]\" with prompting), month Feb, year 2022  Does not know why he came to the hospital  Able to point to objects in extrapersonal space    CRANIAL NERVES:  - II:  R HH present, blurry vision overall  - III, IV, VI: TRACEY, EOMI to pursuit without nystagmus  - V: V1-V3 sensation intact bilaterally  - VII: Face muscles symmetric with smile and eye closure  - VIII: Intact to loud voice  - IX, X: Palate elevated symmetrically bilaterally, no hoarseness  - XI: 5/5 strength on shoulder shrugging bilaterally  - XII: Tongue midline without atrophy or fasciculation    MOTOR: Increased tone in LUE.     STRENGTH: R L  Deltoid  5 5-  Biceps  5 4+  Triceps  5 5    5 5  Quadriceps 4 3  Hamstrings 5 5  DorsiFlex 5          4  PlantarFlex 5 5    COORDINATION: Intact on finger to nose bl,     SENSORY: Intact " to light touch    ROMBERG: Deferred     Neurological Exam  Medications:  Current Outpatient Medications   Medication Instructions    acetaminophen (TYLENOL) 1,000 mg, oral, Every 6 hours PRN    amLODIPine (NORVASC) 10 mg, oral, Daily    aspirin 81 mg, oral, Daily    atorvastatin (LIPITOR) 40 mg, oral, Nightly    gabapentin (NEURONTIN) 100 mg, oral, Daily PRN    lacosamide (VIMPAT) 100 mg, oral, Every 12 hours scheduled    levETIRAcetam (KEPPRA) 1,500 mg, oral, 2 times daily    lisinopril 20 mg, oral, Daily    mirtazapine (REMERON) 7.5 mg, Nightly    pantoprazole (PROTONIX) 40 mg, oral, Daily before breakfast, Do not crush, chew, or split.     Recent Labs:  Results for orders placed or performed during the hospital encounter of 02/18/25 (from the past 24 hours)   POCT GLUCOSE   Result Value Ref Range    POCT Glucose 68 (L) 74 - 99 mg/dL   POCT GLUCOSE   Result Value Ref Range    POCT Glucose 105 (H) 74 - 99 mg/dL   POCT GLUCOSE   Result Value Ref Range    POCT Glucose 64 (L) 74 - 99 mg/dL   POCT GLUCOSE   Result Value Ref Range    POCT Glucose 119 (H) 74 - 99 mg/dL   POCT GLUCOSE   Result Value Ref Range    POCT Glucose 70 (L) 74 - 99 mg/dL   POCT GLUCOSE   Result Value Ref Range    POCT Glucose 98 74 - 99 mg/dL   POCT GLUCOSE   Result Value Ref Range    POCT Glucose 80 74 - 99 mg/dL     Images:   No results found.    Allan Coma Scale  Best Eye Response: Spontaneous  Best Verbal Response: Oriented  Best Motor Response: Follows commands  [place]an Coma Scale Score: 15        Assessment/Plan      Assessment & Plan  Seizure (Multi)    Cardiomegaly    Josiah James is a 64 y.o. male with PMHx of HTN, R parietal, left occipital strokes (2022, unknown etiology) c/b epilepsy (11/2024). Cervical myelopathy s/p C3-T4 laminectomy c/b T2 epidural abscess (2020) who presented as a BAT for seizures with right sided weakness. CTH/CTA was without acute findings. His right flaccidity improved with later examinations and he has mild  left gaze preference now which is likely post ictal. Keppra level <2. He is s/p keppra 3.5g and 2mg Ativan and 5mg Versed. Currently now back on his home LEV 1.5g BID, LAC 100mg BID. Seizures likely caused by medication non-compliance and UTI.      4D Classification of the Paroxysmal Episodes:  Epileptic  Epileptogenic Zone: Right hemisphere  Semiology: R Versive > RUE, Rt face tonic > GTC  Frequency: 2x 11/2024, 2x 2/2025  Localizing Signs: post ictal Rt Todds, Aphasia  Diagnostic Signs: Tongue bite  Etiology: Stroke  Co-morbidities: Cognitive impairment, non compliance  Previous AEDs: NA  Current AEDs: Keppra 1.5g BID, Lacosamide 100mg BID.     Updates 2/22:  - UTI: will switch CTX to Bactrim   - Dispo: Accepted to [facility] pending precert     #Breakthrough seizures likely 2/2 UTI and med non-compliance  :: Routine EEG 2/18: Moderate diffuse encephalopathy   - Continue keppra 1.5mg BID  - Continue Vimpat 100mg BID  - Start Thiamine 500mg TID for 3 days then 100 mg daily  - 2mg Ativan IV PRN for generalzied convulsions lasting >3-5 minutes.    #UTI   :: UA: , WBC 21-50   - CTX from 2/19-2/21  - Ucx: Ecoli   - Start on Bactrim BID (2/22-2/25) for total of 7 day course of antibiotics    #HTN  :: Clinically he is on RA  :: Pocus ultrasound of the chest showed small pericardial effusion, collapsible IVC  - Home meds: lisinopril 20mg, amlodipine 10mg daily  - Hydralazine/labetalol PRN for SBP > 180  - TTE: LVEF 70%, mod aortic valve cusp calcification, mild AV stenosis     #Hx of R parietal and L occipital strokes in 2022  - C/w home aspirin 81, atorvastatin 40mg      #GERD  - C/w home protonix     Diet: Regular  DVT ppx: Lovenox  Code status: DNR, ok for intubation (discussed with his daughter, who reported that these are his past wishes which she agrees with).   NOK: Daughter Cecilia James [phone]     Martha Parker MD  Neurology, PGY-2

## 2025-02-23 VITALS
TEMPERATURE: 98.8 F | SYSTOLIC BLOOD PRESSURE: 126 MMHG | HEIGHT: 68 IN | OXYGEN SATURATION: 95 % | DIASTOLIC BLOOD PRESSURE: 76 MMHG | RESPIRATION RATE: 16 BRPM | WEIGHT: 158.6 LBS | HEART RATE: 72 BPM | BODY MASS INDEX: 24.04 KG/M2

## 2025-02-23 LAB
ALBUMIN SERPL BCP-MCNC: 3.6 G/DL (ref 3.4–5)
ANION GAP SERPL CALC-SCNC: 12 MMOL/L (ref 10–20)
BUN SERPL-MCNC: 6 MG/DL (ref 6–23)
CALCIUM SERPL-MCNC: 8.7 MG/DL (ref 8.6–10.6)
CHLORIDE SERPL-SCNC: 107 MMOL/L (ref 98–107)
CO2 SERPL-SCNC: 26 MMOL/L (ref 21–32)
CREAT SERPL-MCNC: 0.99 MG/DL (ref 0.5–1.3)
EGFRCR SERPLBLD CKD-EPI 2021: 85 ML/MIN/1.73M*2
GLUCOSE BLD MANUAL STRIP-MCNC: 106 MG/DL (ref 74–99)
GLUCOSE BLD MANUAL STRIP-MCNC: 107 MG/DL (ref 74–99)
GLUCOSE BLD MANUAL STRIP-MCNC: 111 MG/DL (ref 74–99)
GLUCOSE BLD MANUAL STRIP-MCNC: 83 MG/DL (ref 74–99)
GLUCOSE BLD MANUAL STRIP-MCNC: 83 MG/DL (ref 74–99)
GLUCOSE SERPL-MCNC: 72 MG/DL (ref 74–99)
MAGNESIUM SERPL-MCNC: 2.24 MG/DL (ref 1.6–2.4)
PHOSPHATE SERPL-MCNC: 2.9 MG/DL (ref 2.5–4.9)
POTASSIUM SERPL-SCNC: 3.6 MMOL/L (ref 3.5–5.3)
SODIUM SERPL-SCNC: 141 MMOL/L (ref 136–145)

## 2025-02-23 PROCEDURE — 99231 SBSQ HOSP IP/OBS SF/LOW 25: CPT

## 2025-02-23 PROCEDURE — 2500000001 HC RX 250 WO HCPCS SELF ADMINISTERED DRUGS (ALT 637 FOR MEDICARE OP)

## 2025-02-23 PROCEDURE — 80069 RENAL FUNCTION PANEL: CPT

## 2025-02-23 PROCEDURE — 2500000004 HC RX 250 GENERAL PHARMACY W/ HCPCS (ALT 636 FOR OP/ED)

## 2025-02-23 PROCEDURE — 82947 ASSAY GLUCOSE BLOOD QUANT: CPT

## 2025-02-23 PROCEDURE — 2500000002 HC RX 250 W HCPCS SELF ADMINISTERED DRUGS (ALT 637 FOR MEDICARE OP, ALT 636 FOR OP/ED): Performed by: STUDENT IN AN ORGANIZED HEALTH CARE EDUCATION/TRAINING PROGRAM

## 2025-02-23 PROCEDURE — 1100000001 HC PRIVATE ROOM DAILY

## 2025-02-23 PROCEDURE — 83735 ASSAY OF MAGNESIUM: CPT

## 2025-02-23 PROCEDURE — 36415 COLL VENOUS BLD VENIPUNCTURE: CPT

## 2025-02-23 RX ORDER — POTASSIUM CHLORIDE 1.5 G/1.58G
40 POWDER, FOR SOLUTION ORAL ONCE
Status: COMPLETED | OUTPATIENT
Start: 2025-02-23 | End: 2025-02-23

## 2025-02-23 RX ORDER — NITROFURANTOIN 25; 75 MG/1; MG/1
100 CAPSULE ORAL EVERY 12 HOURS SCHEDULED
Start: 2025-02-23 | End: 2025-02-26

## 2025-02-23 RX ADMIN — LEVETIRACETAM 1500 MG: 250 TABLET, FILM COATED ORAL at 11:21

## 2025-02-23 RX ADMIN — NITROFURANTOIN MONOHYDRATE/MACROCRYSTALS 100 MG: 25; 75 CAPSULE ORAL at 09:22

## 2025-02-23 RX ADMIN — PANTOPRAZOLE SODIUM 40 MG: 40 TABLET, DELAYED RELEASE ORAL at 06:24

## 2025-02-23 RX ADMIN — POTASSIUM CHLORIDE 40 MEQ: 1.5 POWDER, FOR SOLUTION ORAL at 12:55

## 2025-02-23 RX ADMIN — ASPIRIN 81 MG: 81 TABLET, COATED ORAL at 09:23

## 2025-02-23 RX ADMIN — AMLODIPINE BESYLATE 10 MG: 10 TABLET ORAL at 09:23

## 2025-02-23 RX ADMIN — POLYETHYLENE GLYCOL 3350 17 G: 17 POWDER, FOR SOLUTION ORAL at 21:05

## 2025-02-23 RX ADMIN — LACOSAMIDE 100 MG: 100 TABLET, FILM COATED ORAL at 09:22

## 2025-02-23 RX ADMIN — LISINOPRIL 20 MG: 20 TABLET ORAL at 09:23

## 2025-02-23 RX ADMIN — LACOSAMIDE 100 MG: 100 TABLET, FILM COATED ORAL at 21:05

## 2025-02-23 RX ADMIN — ENOXAPARIN SODIUM 40 MG: 40 INJECTION, SOLUTION SUBCUTANEOUS at 21:05

## 2025-02-23 RX ADMIN — LEVETIRACETAM 1500 MG: 250 TABLET, FILM COATED ORAL at 00:19

## 2025-02-23 RX ADMIN — ATORVASTATIN CALCIUM 40 MG: 40 TABLET, FILM COATED ORAL at 21:05

## 2025-02-23 RX ADMIN — NITROFURANTOIN MONOHYDRATE/MACROCRYSTALS 100 MG: 25; 75 CAPSULE ORAL at 21:05

## 2025-02-23 RX ADMIN — THIAMINE HYDROCHLORIDE 500 MG: 100 INJECTION, SOLUTION INTRAMUSCULAR; INTRAVENOUS at 09:22

## 2025-02-23 ASSESSMENT — COGNITIVE AND FUNCTIONAL STATUS - GENERAL
DAILY ACTIVITIY SCORE: 19
DRESSING REGULAR LOWER BODY CLOTHING: A LITTLE
WALKING IN HOSPITAL ROOM: A LOT
STANDING UP FROM CHAIR USING ARMS: A LITTLE
CLIMB 3 TO 5 STEPS WITH RAILING: A LOT
DRESSING REGULAR UPPER BODY CLOTHING: A LITTLE
TOILETING: A LITTLE
PERSONAL GROOMING: A LITTLE
MOVING TO AND FROM BED TO CHAIR: A LITTLE
MOBILITY SCORE: 17
TURNING FROM BACK TO SIDE WHILE IN FLAT BAD: A LITTLE
HELP NEEDED FOR BATHING: A LITTLE

## 2025-02-23 ASSESSMENT — PAIN - FUNCTIONAL ASSESSMENT
PAIN_FUNCTIONAL_ASSESSMENT: 0-10

## 2025-02-23 ASSESSMENT — PAIN SCALES - GENERAL
PAINLEVEL_OUTOF10: 0 - NO PAIN

## 2025-02-23 NOTE — CARE PLAN
The patient's goals for the shift include      The clinical goals for the shift include pt will remain safe andf free from injury this shift      Problem: Safety - Adult  Goal: Free from fall injury  Outcome: Progressing     Problem: Pain - Adult  Goal: Verbalizes/displays adequate comfort level or baseline comfort level  Outcome: Progressing

## 2025-02-23 NOTE — PROGRESS NOTES
"Josiah James is a 64 y.o. male on day 5 of admission presenting with Seizure (Multi).    Subjective   No acute events overnight. This morning, patient states that he is during well and denies any abdominal pain. Ate some of his meals yesterday.     This morning, appeared more oriented than previous day. Was able to recall conversation yesterday regarding code status and he re-iterated today consistently that he did not wish to be resuscitated nor intubated. Code status was changed to DNR/DNI and daughter was made aware of the conversation.     Objective   Last Recorded Vitals  Blood pressure (!) 150/91, pulse 60, temperature 36.1 °C (96.9 °F), temperature source Temporal, resp. rate 15, height 1.727 m (5' 8\"), weight 71.9 kg (158 lb 9.6 oz), SpO2 95%.    Physical Exam  Neurological Exam:  MENTAL STATUS:  General appearance: Awake, alert, sitting comfortably in bed  Orientation: Oriented to person, location (\"[facility]\"), Month and year  Was able to recall that he came to the hospital due to seizures  Able to point to objects in extrapersonal space    CRANIAL NERVES:  - II:  R HH present, blurry vision overall  - III, IV, VI: TRACEY, EOMI to pursuit without nystagmus  - V: V1-V3 sensation intact bilaterally  - VII: Face muscles symmetric with smile and eye closure  - VIII: Intact to loud voice  - IX, X: Palate elevated symmetrically bilaterally, no hoarseness  - XI: 5/5 strength on shoulder shrugging bilaterally  - XII: Tongue midline without atrophy or fasciculation    MOTOR: Increased tone in LUE.     STRENGTH: R L  Deltoid  5 5-  Biceps  5 4+  Triceps  5 5    5 5  Quadriceps 4 3  Hamstrings 5 5  DorsiFlex 5          4  PlantarFlex 5 5    REFLEXES:        R           L  Biceps               2+       2+  Triceps              2+       2+  Brachioradialis  2+       2+  Patellar              3+       3+  Achilles             2+       2+    COORDINATION: Intact on finger to nose bl,     SENSORY: Intact to " light touch    ROMBERG: Deferred     Neurological Exam  Medications:  Current Outpatient Medications   Medication Instructions    acetaminophen (TYLENOL) 1,000 mg, oral, Every 6 hours PRN    amLODIPine (NORVASC) 10 mg, oral, Daily    aspirin 81 mg, oral, Daily    atorvastatin (LIPITOR) 40 mg, oral, Nightly    gabapentin (NEURONTIN) 100 mg, oral, Daily PRN    lacosamide (VIMPAT) 100 mg, oral, Every 12 hours scheduled    levETIRAcetam (KEPPRA) 1,500 mg, oral, 2 times daily    lisinopril 20 mg, oral, Daily    mirtazapine (REMERON) 7.5 mg, Nightly    pantoprazole (PROTONIX) 40 mg, oral, Daily before breakfast, Do not crush, chew, or split.     Recent Labs:  Results for orders placed or performed during the hospital encounter of 02/18/25 (from the past 24 hours)   CBC and Auto Differential   Result Value Ref Range    WBC 5.2 4.4 - 11.3 x10*3/uL    nRBC 0.0 0.0 - 0.0 /100 WBCs    RBC 4.14 (L) 4.50 - 5.90 x10*6/uL    Hemoglobin 13.8 13.5 - 17.5 g/dL    Hematocrit 40.1 (L) 41.0 - 52.0 %    MCV 97 80 - 100 fL    MCH 33.3 26.0 - 34.0 pg    MCHC 34.4 32.0 - 36.0 g/dL    RDW 15.1 (H) 11.5 - 14.5 %    Platelets 235 150 - 450 x10*3/uL    Neutrophils % 64.6 40.0 - 80.0 %    Immature Granulocytes %, Automated 0.2 0.0 - 0.9 %    Lymphocytes % 23.9 13.0 - 44.0 %    Monocytes % 7.9 2.0 - 10.0 %    Eosinophils % 2.1 0.0 - 6.0 %    Basophils % 1.3 0.0 - 2.0 %    Neutrophils Absolute 3.35 1.20 - 7.70 x10*3/uL    Immature Granulocytes Absolute, Automated 0.01 0.00 - 0.70 x10*3/uL    Lymphocytes Absolute 1.24 1.20 - 4.80 x10*3/uL    Monocytes Absolute 0.41 0.10 - 1.00 x10*3/uL    Eosinophils Absolute 0.11 0.00 - 0.70 x10*3/uL    Basophils Absolute 0.07 0.00 - 0.10 x10*3/uL   Renal Function Panel   Result Value Ref Range    Glucose 82 74 - 99 mg/dL    Sodium 142 136 - 145 mmol/L    Potassium 3.7 3.5 - 5.3 mmol/L    Chloride 106 98 - 107 mmol/L    Bicarbonate 27 21 - 32 mmol/L    Anion Gap 13 10 - 20 mmol/L    Urea Nitrogen 8 6 - 23 mg/dL     Creatinine 0.85 0.50 - 1.30 mg/dL    eGFR >90 >60 mL/min/1.73m*2    Calcium 9.0 8.6 - 10.6 mg/dL    Phosphorus 2.4 (L) 2.5 - 4.9 mg/dL    Albumin 3.8 3.4 - 5.0 g/dL   Magnesium   Result Value Ref Range    Magnesium 1.87 1.60 - 2.40 mg/dL   POCT GLUCOSE   Result Value Ref Range    POCT Glucose 90 74 - 99 mg/dL   POCT GLUCOSE   Result Value Ref Range    POCT Glucose 103 (H) 74 - 99 mg/dL   POCT GLUCOSE   Result Value Ref Range    POCT Glucose 114 (H) 74 - 99 mg/dL   POCT GLUCOSE   Result Value Ref Range    POCT Glucose 135 (H) 74 - 99 mg/dL   POCT GLUCOSE   Result Value Ref Range    POCT Glucose 127 (H) 74 - 99 mg/dL   POCT GLUCOSE   Result Value Ref Range    POCT Glucose 111 (H) 74 - 99 mg/dL   POCT GLUCOSE   Result Value Ref Range    POCT Glucose 106 (H) 74 - 99 mg/dL   POCT GLUCOSE   Result Value Ref Range    POCT Glucose 83 74 - 99 mg/dL     Images:   No results found.    [place] Coma Scale  Best Eye Response: Spontaneous  Best Verbal Response: Oriented  Best Motor Response: Follows commands  [place] Coma Scale Score: 15        Assessment/Plan      Assessment & Plan  Seizure (Multi)    Cardiomegaly    Josiah James is a 64 y.o. male with PMHx of HTN, R parietal, left occipital strokes (2022, unknown etiology) c/b epilepsy (11/2024). Cervical myelopathy s/p C3-T4 laminectomy c/b T2 epidural abscess (2020) who presented as a BAT for seizures with right sided weakness. CTH/CTA was without acute findings. His right flaccidity improved with later examinations and he has mild left gaze preference now which is likely post ictal. Keppra level <2. He is s/p keppra 3.5g and 2mg Ativan and 5mg Versed. Currently now back on his home LEV 1.5g BID, LAC 100mg BID. Seizures likely caused by medication non-compliance and UTI.      4D Classification of the Paroxysmal Episodes:  Epileptic  Epileptogenic Zone: Right hemisphere  Semiology: R Versive > RUE, Rt face tonic > GTC  Frequency: 2x 11/2024, 2x 2/2025  Localizing Signs: post  ictal Rt Todds, Aphasia  Diagnostic Signs: Tongue bite  Etiology: Stroke  Co-morbidities: Cognitive impairment, non compliance  Previous AEDs: NA  Current AEDs: Keppra 1.5g BID, Lacosamide 100mg BID.     Updates 2/23:  - Ucx also grew E. Faecalis as well, resistant to bactrim. Was switched to macrobid from bactrim.   - Dispo: Accepted to [facility] pending precert     #Breakthrough seizures likely 2/2 UTI and med non-compliance  :: Routine EEG 2/18: Moderate diffuse encephalopathy   - Continue keppra 1.5mg BID  - Continue Vimpat 100mg BID  - Start Thiamine 500mg TID for 3 days then 100 mg daily  - 2mg Ativan IV PRN for generalzied convulsions lasting >3-5 minutes.    #UTI   :: UA: , WBC 21-50   - CTX from 2/19-2/22, bactrim (2/22, stopped due to resistant E. Faecalis)   - Ucx: Ecoli   [ ] Started on macrobid (2/22-2/25) for total of 7 day course of antibiotics    #HTN  :: Clinically he is on RA  :: Pocus ultrasound of the chest showed small pericardial effusion, collapsible IVC  - Home meds: lisinopril 20mg, amlodipine 10mg daily  - Hydralazine/labetalol PRN for SBP > 180  - TTE: LVEF 70%, mod aortic valve cusp calcification, mild AV stenosis, trivial pericardial effusion    #Hx of R parietal and L occipital strokes in 2022  - C/w home aspirin 81, atorvastatin 40mg      #GERD  - C/w home protonix     Diet: Regular  DVT ppx: Lovenox  Code status: DNR/DNI   NOK: Daughter Cecilia James [phone]     Martha Parker MD  Neurology, PGY-2

## 2025-02-23 NOTE — HOSPITAL COURSE
Josiah James is a 64 y.o. male with PMHx of HTN, R parietal, left occipital strokes (2022, unknown etiology) c/b epilepsy (11/2024). Cervical myelopathy s/p C3-T4 laminectomy c/b T2 epidural abscess (2020) who presented as a BAT for seizures w/right sided weakness.    He was reportedly found at home his bed agitated, screaming with jerking of his L leg and blood coming out of his mouth. When in the ED, he had another seizure described as right head jerking with tonic LUE posturing, during this he was able to look at examiner and attempted to answer questions but could not, this later progressed to right version and right gaze deviation(after at least 3-5 minutes) with pulling of the right face and later his RUE was flexed at the elbow. He received 2mg and 5mg versed. Afterwards, he had R sided weakness so a BAT was called. Was then loaded with LEV 3.5g.    CTH/CTA was without acute findings. His right flaccidity improved with later examinations. Keppra level <2.  He was restarted on his home LEV 1.5g BID, LAC 100mg BID. Was additionally found to have an UTI growing E. Faecalis and E.coli. His daughter stated that he does not take any medications at home. As such, his breakthrough seizures is likely in the setting of medication non-compliance and UTI.     To do:  [ ] Pending precert for SNF, difficult social situation. Daughter does not want to take care of him anymore and says the home environment is toxic for his kids. He wants her to move out because he thinks she's trying to take her house. She says that she is now in the process of moving out. Says that she wants to take a step back in terms of taking care of him. He has a partner Latesha but it doesn't seem like she's   [ ] Finish up total 7 day course of abx for uti

## 2025-02-24 LAB
ALBUMIN SERPL BCP-MCNC: 3.8 G/DL (ref 3.4–5)
ANION GAP SERPL CALC-SCNC: 12 MMOL/L (ref 10–20)
AORTIC VALVE MEAN GRADIENT: 10 MMHG
AORTIC VALVE PEAK VELOCITY: 2.14 M/S
AV PEAK GRADIENT: 18 MMHG
AVA (PEAK VEL): 2.13 CM2
AVA (VTI): 2.29 CM2
BODY SURFACE AREA: 1.86 M2
BUN SERPL-MCNC: 7 MG/DL (ref 6–23)
CALCIUM SERPL-MCNC: 9.1 MG/DL (ref 8.6–10.6)
CHLORIDE SERPL-SCNC: 107 MMOL/L (ref 98–107)
CO2 SERPL-SCNC: 25 MMOL/L (ref 21–32)
CREAT SERPL-MCNC: 0.93 MG/DL (ref 0.5–1.3)
EGFRCR SERPLBLD CKD-EPI 2021: >90 ML/MIN/1.73M*2
EJECTION FRACTION APICAL 4 CHAMBER: 72.4
EJECTION FRACTION: 70 %
GLUCOSE BLD MANUAL STRIP-MCNC: 112 MG/DL (ref 74–99)
GLUCOSE BLD MANUAL STRIP-MCNC: 79 MG/DL (ref 74–99)
GLUCOSE BLD MANUAL STRIP-MCNC: 87 MG/DL (ref 74–99)
GLUCOSE BLD MANUAL STRIP-MCNC: 88 MG/DL (ref 74–99)
GLUCOSE SERPL-MCNC: 72 MG/DL (ref 74–99)
LEFT ATRIUM VOLUME AREA LENGTH INDEX BSA: 26.4 ML/M2
LEFT VENTRICLE INTERNAL DIMENSION DIASTOLE: 4 CM (ref 3.5–6)
LEFT VENTRICULAR OUTFLOW TRACT DIAMETER: 2.2 CM
MAGNESIUM SERPL-MCNC: 2.21 MG/DL (ref 1.6–2.4)
MITRAL VALVE E/A RATIO: 0.65
PHOSPHATE SERPL-MCNC: 2.7 MG/DL (ref 2.5–4.9)
POTASSIUM SERPL-SCNC: 4.6 MMOL/L (ref 3.5–5.3)
RIGHT VENTRICLE FREE WALL PEAK S': 19.4 CM/S
SODIUM SERPL-SCNC: 139 MMOL/L (ref 136–145)
TRICUSPID ANNULAR PLANE SYSTOLIC EXCURSION: 2.3 CM

## 2025-02-24 PROCEDURE — 2500000001 HC RX 250 WO HCPCS SELF ADMINISTERED DRUGS (ALT 637 FOR MEDICARE OP)

## 2025-02-24 PROCEDURE — 99231 SBSQ HOSP IP/OBS SF/LOW 25: CPT

## 2025-02-24 PROCEDURE — 97530 THERAPEUTIC ACTIVITIES: CPT | Mod: GP,CQ

## 2025-02-24 PROCEDURE — 82947 ASSAY GLUCOSE BLOOD QUANT: CPT

## 2025-02-24 PROCEDURE — 2500000004 HC RX 250 GENERAL PHARMACY W/ HCPCS (ALT 636 FOR OP/ED)

## 2025-02-24 PROCEDURE — 1100000001 HC PRIVATE ROOM DAILY

## 2025-02-24 PROCEDURE — 36415 COLL VENOUS BLD VENIPUNCTURE: CPT

## 2025-02-24 PROCEDURE — 80069 RENAL FUNCTION PANEL: CPT

## 2025-02-24 PROCEDURE — 83735 ASSAY OF MAGNESIUM: CPT

## 2025-02-24 PROCEDURE — 2500000002 HC RX 250 W HCPCS SELF ADMINISTERED DRUGS (ALT 637 FOR MEDICARE OP, ALT 636 FOR OP/ED): Performed by: STUDENT IN AN ORGANIZED HEALTH CARE EDUCATION/TRAINING PROGRAM

## 2025-02-24 RX ADMIN — ENOXAPARIN SODIUM 40 MG: 40 INJECTION, SOLUTION SUBCUTANEOUS at 15:34

## 2025-02-24 RX ADMIN — ATORVASTATIN CALCIUM 40 MG: 40 TABLET, FILM COATED ORAL at 20:29

## 2025-02-24 RX ADMIN — ASPIRIN 81 MG: 81 TABLET, COATED ORAL at 08:47

## 2025-02-24 RX ADMIN — NITROFURANTOIN MONOHYDRATE/MACROCRYSTALS 100 MG: 25; 75 CAPSULE ORAL at 08:47

## 2025-02-24 RX ADMIN — AMLODIPINE BESYLATE 10 MG: 10 TABLET ORAL at 08:47

## 2025-02-24 RX ADMIN — LISINOPRIL 20 MG: 20 TABLET ORAL at 08:47

## 2025-02-24 RX ADMIN — POLYETHYLENE GLYCOL 3350 17 G: 17 POWDER, FOR SOLUTION ORAL at 08:47

## 2025-02-24 RX ADMIN — NITROFURANTOIN MONOHYDRATE/MACROCRYSTALS 100 MG: 25; 75 CAPSULE ORAL at 21:54

## 2025-02-24 RX ADMIN — LEVETIRACETAM 1500 MG: 250 TABLET, FILM COATED ORAL at 00:14

## 2025-02-24 RX ADMIN — LEVETIRACETAM 1500 MG: 250 TABLET, FILM COATED ORAL at 12:21

## 2025-02-24 RX ADMIN — LEVETIRACETAM 1500 MG: 250 TABLET, FILM COATED ORAL at 22:00

## 2025-02-24 RX ADMIN — PANTOPRAZOLE SODIUM 40 MG: 40 TABLET, DELAYED RELEASE ORAL at 06:12

## 2025-02-24 RX ADMIN — LACOSAMIDE 100 MG: 100 TABLET, FILM COATED ORAL at 08:47

## 2025-02-24 RX ADMIN — LACOSAMIDE 100 MG: 100 TABLET, FILM COATED ORAL at 20:29

## 2025-02-24 ASSESSMENT — COGNITIVE AND FUNCTIONAL STATUS - GENERAL
TURNING FROM BACK TO SIDE WHILE IN FLAT BAD: A LITTLE
TOILETING: A LITTLE
DAILY ACTIVITIY SCORE: 19
DRESSING REGULAR LOWER BODY CLOTHING: A LITTLE
CLIMB 3 TO 5 STEPS WITH RAILING: A LOT
WALKING IN HOSPITAL ROOM: TOTAL
STANDING UP FROM CHAIR USING ARMS: A LOT
CLIMB 3 TO 5 STEPS WITH RAILING: TOTAL
MOBILITY SCORE: 17
MOVING FROM LYING ON BACK TO SITTING ON SIDE OF FLAT BED WITH BEDRAILS: A LITTLE
MOBILITY SCORE: 12
WALKING IN HOSPITAL ROOM: A LOT
MOVING TO AND FROM BED TO CHAIR: A LOT
MOVING TO AND FROM BED TO CHAIR: A LITTLE
DRESSING REGULAR UPPER BODY CLOTHING: A LITTLE
PERSONAL GROOMING: A LITTLE
HELP NEEDED FOR BATHING: A LITTLE
TURNING FROM BACK TO SIDE WHILE IN FLAT BAD: A LITTLE
STANDING UP FROM CHAIR USING ARMS: A LITTLE

## 2025-02-24 ASSESSMENT — PAIN SCALES - GENERAL
PAINLEVEL_OUTOF10: 0 - NO PAIN

## 2025-02-24 ASSESSMENT — PAIN - FUNCTIONAL ASSESSMENT
PAIN_FUNCTIONAL_ASSESSMENT: 0-10

## 2025-02-24 NOTE — PROGRESS NOTES
"Josiah James is a 64 y.o. male on day 6 of admission presenting with Seizure (Multi).    Subjective   NAONE. Patient denies abd pain, does not remember the last bowel movement. Denies headache, reports blurry vision which is baseline.     Objective   Last Recorded Vitals  Blood pressure (!) 151/93, pulse 78, temperature 36.4 °C (97.5 °F), temperature source Temporal, resp. rate 16, height 1.727 m (5' 8\"), weight 71.9 kg (158 lb 9.6 oz), SpO2 95%.    Physical Exam  Neurological Exam:  MENTAL STATUS:  General appearance: Awake, alert, sitting comfortably in bed  Orientation: Oriented to person, location (\"[facility]\"), Month and year  Was able to recall that he came to the hospital due to seizures, but does not remember seizures     CRANIAL NERVES:  - II:  R HH present, blurry vision overall  - III, IV, VI: TRACEY, EOMI to pursuit without nystagmus  - V: V1-V3 sensation intact bilaterally  - VII: Face muscles symmetric with smile and eye closure  - VIII: Intact to loud voice  - IX, X: Palate elevated symmetrically bilaterally, no hoarseness  - XI: 5/5 strength on shoulder shrugging bilaterally  - XII: Tongue midline without atrophy or fasciculation    MOTOR: Increased tone in LUE.     STRENGTH: R L  Deltoid  5 5-  Biceps  5 4+  Triceps  5 5    5 5  Quadriceps 4 3  Hamstrings 5 5  DorsiFlex 5          4  PlantarFlex 5 5    REFLEXES:        R           L  Biceps               2+       2+  Triceps              2+       2+  Brachioradialis  2+       2+  Patellar              3+       3+  Achilles             2+       2+      SENSORY: Intact to light touch UE and LE b/l    ROMBERG: Deferred     Neurological Exam  Medications:  Current Outpatient Medications   Medication Instructions    acetaminophen (TYLENOL) 1,000 mg, oral, Every 6 hours PRN    amLODIPine (NORVASC) 10 mg, oral, Daily    aspirin 81 mg, oral, Daily    atorvastatin (LIPITOR) 40 mg, oral, Nightly    gabapentin (NEURONTIN) 100 mg, oral, Daily PRN "    lacosamide (VIMPAT) 100 mg, oral, Every 12 hours scheduled    levETIRAcetam (KEPPRA) 1,500 mg, oral, 2 times daily    lisinopril 20 mg, oral, Daily    mirtazapine (REMERON) 7.5 mg, Nightly    nitrofurantoin, macrocrystal-monohydrate, (Macrobid) 100 mg capsule 100 mg, oral, Every 12 hours scheduled    pantoprazole (PROTONIX) 40 mg, oral, Daily before breakfast, Do not crush, chew, or split.     Recent Labs:  Results for orders placed or performed during the hospital encounter of 02/18/25 (from the past 24 hours)   POCT GLUCOSE   Result Value Ref Range    POCT Glucose 83 74 - 99 mg/dL   POCT GLUCOSE   Result Value Ref Range    POCT Glucose 107 (H) 74 - 99 mg/dL   POCT GLUCOSE   Result Value Ref Range    POCT Glucose 88 74 - 99 mg/dL   POCT GLUCOSE   Result Value Ref Range    POCT Glucose 87 74 - 99 mg/dL     Images:   No results found.    [place]an Coma Scale  Best Eye Response: Spontaneous  Best Verbal Response: Oriented  Best Motor Response: Follows commands  [place]an Coma Scale Score: 15        EEG    Result Date: 2/19/2025  IMPRESSION Impression This routine EEG is indicative of a moderate diffuse encephalopathy. No epileptiform activity or lateralizing signs seen. A full report will be scanned into the patient's chart at a later time. This report has been interpreted and electronically signed by    Assessment/Plan      Urine Culture 20,000 - 80,000 CFU/mL Escherichia coli Abnormal    >=100,000 CFU/mL Enterococcus faecalis Abnormal         Resulting Agency: [facility]     Susceptibility       Escherichia coli Enterococcus faecalis     MICROSCAN MICROSCAN    Ampicillin Susceptible Susceptible    Cefazolin Susceptible     Cefazolin (uncomplicated UTIs only) Susceptible     Ciprofloxacin Susceptible Resistant    Gentamicin Susceptible     Levofloxacin  Resistant    Nitrofurantoin Susceptible Susceptible    Penicillin  Susceptible    Piperacillin/Tazobactam Susceptible     Trimethoprim/Sulfamethoxazole Susceptible  Resistant    Vancomycin  Susceptible         Assessment & Plan  Seizure (Multi)    Cardiomegaly    Josiah James is a 64 y.o. male with PMHx of HTN, R parietal, left occipital strokes (2022, unknown etiology) c/b epilepsy (11/2024). Cervical myelopathy s/p C3-T4 laminectomy c/b T2 epidural abscess (2020) who presented as a BAT for seizures with right sided weakness. CTH/CTA was without acute findings. His right flaccidity improved with later examinations and he has mild left gaze preference now which is likely post ictal. Keppra level <2. He is s/p keppra 3.5g and 2mg Ativan and 5mg Versed. Currently now back on his home LEV 1.5g BID, LAC 100mg BID. Seizures likely caused by medication non-compliance and UTI.      4D Classification of the Paroxysmal Episodes:  Epileptic  Epileptogenic Zone: Right hemisphere  Semiology: R Versive > RUE, Rt face tonic > GTC  Frequency: 2x 11/2024, 2x 2/2025  Localizing Signs: post ictal Rt Todds, Aphasia  Diagnostic Signs: Tongue bite  Etiology: Stroke  Co-morbidities: Cognitive impairment, non compliance  Previous AEDs: NA  Current AEDs: Keppra 1.5g BID, Lacosamide 100mg BID.     Updates 2/24:  - Ucx also grew E. Faecalis as well, resistant to bactrim. Was switched to macrobid from bactrim.   - Dispo: Accepted to [facility] pending precert     #Breakthrough seizures likely 2/2 UTI and med non-compliance  :: Routine EEG 2/18: Moderate diffuse encephalopathy   - Continue keppra 1.5mg BID  - Continue Vimpat 100mg BID  - Start Thiamine 500mg TID for 3 days then 100 mg daily  - 2mg Ativan IV PRN for generalzied convulsions lasting >3-5 minutes.    #UTI   :: UA: , WBC 21-50   - CTX from 2/19-2/22, bactrim (2/22, stopped due to resistant E. Faecalis), Macrobid (02/23-25) for 7 total days  - Ucx: Ecoli     #HTN  :: Clinically he is on RA  :: Pocus ultrasound of the chest showed small pericardial effusion, collapsible IVC  - Home meds: lisinopril 20mg, amlodipine 10mg daily  -  Hydralazine/labetalol PRN for SBP > 180  - TTE: LVEF 70%, mod aortic valve cusp calcification, mild AV stenosis, trivial pericardial effusion    #Hx of R parietal and L occipital strokes in 2022  - C/w home aspirin 81, atorvastatin 40mg      #GERD  - C/w home protonix     Diet: Regular  DVT ppx: Lovenox  Code status: DNR/DNI   NOK: Daughter Cecilia James [phone]     Marquez Sims,   Neurology, PGY-1

## 2025-02-24 NOTE — CARE PLAN
Problem: Pain - Adult  Goal: Verbalizes/displays adequate comfort level or baseline comfort level  Outcome: Met     Problem: Safety - Adult  Goal: Free from fall injury  Outcome: Met     Problem: Chronic Conditions and Co-morbidities  Goal: Patient's chronic conditions and co-morbidity symptoms are monitored and maintained or improved  Outcome: Met     Problem: Nutrition  Goal: Nutrient intake appropriate for maintaining nutritional needs  Outcome: Met   The patient's goals for the shift include  working with therapy.    The clinical goals for the shift include pt will remain safe and free from falls this shift    Over the shift, the patient was able to rest, work with therapy, remain pain free, and had an improvement in appetite.

## 2025-02-24 NOTE — CARE PLAN
Transitional Care Coordinator Note: Patient discussed with medical team, per medical team patient is medically ready. Discharge dispo: Precert still pending at [facility].  ADOD 2-3 Days.  Mike Xie RN  Transitional Care Coordinator      Update: insurance offering a P2p by calling [phone] by 12pm today. [facility] Notified Medical Team.

## 2025-02-24 NOTE — CARE PLAN
The patient's goals for the shift include      The clinical goals for the shift include pt will remain safe and free from falls this shift      Problem: Safety - Adult  Goal: Free from fall injury  Outcome: Progressing     Problem: Chronic Conditions and Co-morbidities  Goal: Patient's chronic conditions and co-morbidity symptoms are monitored and maintained or improved  Outcome: Progressing     Problem: Pain - Adult  Goal: Verbalizes/displays adequate comfort level or baseline comfort level  Outcome: Progressing

## 2025-02-24 NOTE — PROGRESS NOTES
Physical Therapy    Physical Therapy Treatment    Patient Name: Josiah James  MRN: [id number]  Department: [place] [address]  Room: [address] [place] [address]  Today's Date: 2/24/2025  Time Calculation  Start Time: 1356  Stop Time: 1413  Time Calculation (min): 17 min         Assessment/Plan   PT Assessment  PT Assessment Results: Decreased strength, Decreased range of motion, Decreased endurance, Impaired balance, Decreased mobility, Decreased coordination, Impaired judgement, Decreased safety awareness  Rehab Prognosis: Good  Barriers to Discharge Home: Caregiver assistance, Physical needs  Caregiver Assistance: Patient lives alone and/or does not have reliable caregiver assistance  Physical Needs: 24hr mobility assistance needed  Evaluation/Treatment Tolerance: Patient tolerated treatment well  Medical Staff Made Aware: Yes  End of Session Communication: Bedside nurse  Assessment Comment: Pt offers good effort with PT, continues to demo impaired sitting and standing balance and ataxic LE movements. Remains appropriate for mod intensity therapy  End of Session Patient Position: Bed, 3 rail up, Alarm on     PT Plan  Treatment/Interventions: Bed mobility, Transfer training, Balance training, Strengthening, Therapeutic exercise, Therapeutic activity  PT Plan: Ongoing PT  PT Frequency: 3 times per week  PT Discharge Recommendations: Moderate intensity level of continued care  PT Recommended Transfer Status: Assist x1  PT - OK to Discharge: Yes      General Visit Information:   PT  Visit  PT Received On: 02/24/25  Response to Previous Treatment: Patient with no complaints from previous session.  General  Prior to Session Communication: Bedside nurse  Patient Position Received: Bed, 3 rail up, Alarm on  General Comment: Pt supine in bed, pleasant and agreeable to therapy  Per handoff with RN, pt is appropriate for therapy, vitals are stable and pain is controlled. Other concerns prior to tx are: none    Subjective    Precautions:  Precautions  Medical Precautions: Fall precautions, Seizure precautions    Objective   Pain:  Pain Assessment  Pain Assessment: 0-10  0-10 (Numeric) Pain Score: 0 - No pain  Cognition:  Cognition  Overall Cognitive Status: Within Functional Limits  Orientation Level: Oriented X4    Treatments:     Balance/Neuromuscular Re-Education  Balance/Neuromuscular Re-Education Activity Performed: Yes  Balance/Neuromuscular Re-Education Activity 1: Sitting EOB, B feet to floor and B UE support at sides, close SBA-Brenda and cues to correct post lean  Balance/Neuromuscular Re-Education Activity 2: Static standing 2x~1min with Reyna and B HHA, pt demos significant L sided lean, but reports feeling like he is leaning to R. Requires max verbal, visual and tactile cues to correct. Would benefit from use of mirror for visual feedback    Bed Mobility  Bed Mobility: Yes  Bed Mobility 1  Bed Mobility 1: Supine to sitting, Sitting to supine  Level of Assistance 1: Minimum assistance, Moderate verbal cues, Moderate tactile cues    Ambulation/Gait Training  Ambulation/Gait Training Performed: Yes  Ambulation/Gait Training 1  Surface 1: Level tile  Device 1:  (B HHA)  Gait Support Devices: Gait belt  Assistance 1: Maximum assistance, Maximum verbal cues, Maximum tactile cues  Comments/Distance (ft) 1: 3 side steps at EOB, significant R LE ataxia noted  Transfers  Transfer: Yes  Transfer 1  Transfer From 1: Sit to, Stand to  Transfer to 1: Sit  Technique 1: Sit to stand, Stand to sit  Transfer Device 1: Gait belt, Walker  Transfer Level of Assistance 1: Moderate assistance, Maximum verbal cues    Outcome Measures:     [facility] Basic Mobility  Turning from your back to your side while in a flat bed without using bedrails: A little  Moving from lying on your back to sitting on the side of a flat bed without using bedrails: A little  Moving to and from bed to chair (including a wheelchair): A lot  Standing up from a chair using your  arms (e.g. wheelchair or bedside chair): A lot  To walk in hospital room: Total  Climbing 3-5 steps with railing: Total  Basic Mobility - Total Score: 12    Education Documentation  Precautions, taught by Stephanie Blancas PTA at 2/24/2025  2:26 PM.  Learner: Patient  Readiness: Acceptance  Method: Explanation, Demonstration  Response: Verbalizes Understanding, Needs Reinforcement  Comment: precautions, safe mobility    Mobility Training, taught by Stephanie Blancas PTA at 2/24/2025  2:26 PM.  Learner: Patient  Readiness: Acceptance  Method: Explanation, Demonstration  Response: Verbalizes Understanding, Needs Reinforcement  Comment: precautions, safe mobility    Education Comments  No comments found.        OP EDUCATION:       Encounter Problems       Encounter Problems (Active)       PT Transfers       STG - Transfer from bed to chair min assist (Progressing)       Start:  02/20/25    Expected End:  03/06/25            STG - Patient to transfer to and from sit to supine CGA (Progressing)       Start:  02/20/25    Expected End:  03/06/25            STG - Patient will transfer sit to and from stand min assist (Progressing)       Start:  02/20/25    Expected End:  03/06/25               Pain - Adult            Stephanie Blancas

## 2025-02-25 VITALS
BODY MASS INDEX: 24.04 KG/M2 | TEMPERATURE: 98.1 F | OXYGEN SATURATION: 96 % | DIASTOLIC BLOOD PRESSURE: 79 MMHG | HEART RATE: 73 BPM | RESPIRATION RATE: 15 BRPM | WEIGHT: 158.6 LBS | SYSTOLIC BLOOD PRESSURE: 115 MMHG | HEIGHT: 68 IN

## 2025-02-25 LAB
ALBUMIN SERPL BCP-MCNC: 4 G/DL (ref 3.4–5)
ANION GAP SERPL CALC-SCNC: 14 MMOL/L (ref 10–20)
BUN SERPL-MCNC: 9 MG/DL (ref 6–23)
CALCIUM SERPL-MCNC: 9.1 MG/DL (ref 8.6–10.6)
CHLORIDE SERPL-SCNC: 105 MMOL/L (ref 98–107)
CO2 SERPL-SCNC: 23 MMOL/L (ref 21–32)
CREAT SERPL-MCNC: 0.95 MG/DL (ref 0.5–1.3)
EGFRCR SERPLBLD CKD-EPI 2021: 89 ML/MIN/1.73M*2
GLUCOSE BLD MANUAL STRIP-MCNC: 116 MG/DL (ref 74–99)
GLUCOSE BLD MANUAL STRIP-MCNC: 84 MG/DL (ref 74–99)
GLUCOSE BLD MANUAL STRIP-MCNC: 98 MG/DL (ref 74–99)
GLUCOSE SERPL-MCNC: 77 MG/DL (ref 74–99)
MAGNESIUM SERPL-MCNC: 2.14 MG/DL (ref 1.6–2.4)
PHOSPHATE SERPL-MCNC: 2.7 MG/DL (ref 2.5–4.9)
POTASSIUM SERPL-SCNC: 3.9 MMOL/L (ref 3.5–5.3)
SODIUM SERPL-SCNC: 138 MMOL/L (ref 136–145)

## 2025-02-25 PROCEDURE — 2500000002 HC RX 250 W HCPCS SELF ADMINISTERED DRUGS (ALT 637 FOR MEDICARE OP, ALT 636 FOR OP/ED): Performed by: STUDENT IN AN ORGANIZED HEALTH CARE EDUCATION/TRAINING PROGRAM

## 2025-02-25 PROCEDURE — 99239 HOSP IP/OBS DSCHRG MGMT >30: CPT

## 2025-02-25 PROCEDURE — 2500000004 HC RX 250 GENERAL PHARMACY W/ HCPCS (ALT 636 FOR OP/ED)

## 2025-02-25 PROCEDURE — 80069 RENAL FUNCTION PANEL: CPT

## 2025-02-25 PROCEDURE — 2500000001 HC RX 250 WO HCPCS SELF ADMINISTERED DRUGS (ALT 637 FOR MEDICARE OP)

## 2025-02-25 PROCEDURE — 83735 ASSAY OF MAGNESIUM: CPT

## 2025-02-25 PROCEDURE — 36415 COLL VENOUS BLD VENIPUNCTURE: CPT

## 2025-02-25 PROCEDURE — 82947 ASSAY GLUCOSE BLOOD QUANT: CPT

## 2025-02-25 RX ADMIN — ASPIRIN 81 MG: 81 TABLET, COATED ORAL at 08:43

## 2025-02-25 RX ADMIN — NITROFURANTOIN MONOHYDRATE/MACROCRYSTALS 100 MG: 25; 75 CAPSULE ORAL at 08:43

## 2025-02-25 RX ADMIN — AMLODIPINE BESYLATE 10 MG: 10 TABLET ORAL at 08:43

## 2025-02-25 RX ADMIN — LEVETIRACETAM 1500 MG: 250 TABLET, FILM COATED ORAL at 11:29

## 2025-02-25 RX ADMIN — ENOXAPARIN SODIUM 40 MG: 40 INJECTION, SOLUTION SUBCUTANEOUS at 16:08

## 2025-02-25 RX ADMIN — PANTOPRAZOLE SODIUM 40 MG: 40 TABLET, DELAYED RELEASE ORAL at 06:10

## 2025-02-25 RX ADMIN — LACOSAMIDE 100 MG: 100 TABLET, FILM COATED ORAL at 08:43

## 2025-02-25 RX ADMIN — POLYETHYLENE GLYCOL 3350 17 G: 17 POWDER, FOR SOLUTION ORAL at 08:43

## 2025-02-25 RX ADMIN — LISINOPRIL 20 MG: 20 TABLET ORAL at 08:43

## 2025-02-25 ASSESSMENT — PAIN SCALES - GENERAL
PAINLEVEL_OUTOF10: 0 - NO PAIN
PAINLEVEL_OUTOF10: 0 - NO PAIN

## 2025-02-25 ASSESSMENT — PAIN - FUNCTIONAL ASSESSMENT
PAIN_FUNCTIONAL_ASSESSMENT: 0-10
PAIN_FUNCTIONAL_ASSESSMENT: 0-10

## 2025-02-25 NOTE — CARE PLAN
Met with pt and introduced myself as care coordinator with Care Transitions Team for discharge planning. Pt is agreeable to discharge to SNF. Pt is aware of the  of time of 5pm with [facility]. Transport information given to the [facility]. MD is aware of Pt discharge along with the Nurse. Pt is leaving [facility] with [facility]. Pt reported no safety concerns at home.  Pt's address, phone number, and contact information was verified.  Discharge Planning: Pt is discharging to [facility]. ADOD 225

## 2025-02-25 NOTE — DISCHARGE SUMMARY
Discharge Diagnosis  Seizure (Multi)    Epilepsy Quality and Core Measure Topics  The patient was encouraged to keep a seizure diary  The patient was encouraged to practice good sleep hygiene  The risks and benefits of pertinent medications were discussed with the patient and/or family  First Time Seizures  No this is not the patient's first seizure  Is this patient seizure free?  Yes, no treatment changes at this time  Should this patient observe standard seizure precautions?  Yes Reviewed seizure precautions with patient; specifically, the patient may not drive, may not operate heavy machinery, ought not swim unsupervised, should shower rather than bath, should be cautious with hot or heavy objects, and should not perform any activities at heights such as on a ladder. This will be re-assessed at the patient's next appointment.   Medication Side Effects Discussion Statement  The risks and benefits of pertinent medications were discussed with the patient and/or kin.      Issues Requiring Follow-Up  Follow up with Epilepsy    Test Results Pending At Discharge  Pending Labs       No current pending labs.            Hospital Course  Josiah James is a 64 y.o. male with PMHx of HTN, R parietal, left occipital strokes (2022, unknown etiology) c/b epilepsy (11/2024). Cervical myelopathy s/p C3-T4 laminectomy c/b T2 epidural abscess (2020) who presented as a BAT for seizures w/right sided weakness.    He was reportedly found at home his bed agitated, screaming with jerking of his L leg and blood coming out of his mouth. When in the ED, he had another seizure described as right head jerking with tonic LUE posturing, during this he was able to look at examiner and attempted to answer questions but could not, this later progressed to right version and right gaze deviation(after at least 3-5 minutes) with pulling of the right face and later his RUE was flexed at the elbow. He received 2mg and 5mg versed. Afterwards, he had R  "sided weakness so a BAT was called. Was then loaded with LEV 3.5g.    CTH/CTA was without acute findings. His right flaccidity improved with later examinations. Keppra level <2.  He was restarted on his home LEV 1.5g BID, LAC 100mg BID. EEG showed no ongoing epileptiform activity. Was additionally found to have an UTI growing E. Faecalis and E.coli. Due to susceptibilities, Macrobid was chosen for a 7 day course of abx which he completed inpatient. His daughter stated that he does not take any medications at home. As such, his breakthrough seizures is likely in the setting of medication non-compliance and UTI.     Pertinent Physical Exam At Time of Discharge  Physical Exam  Neurological Exam:  MENTAL STATUS:  General appearance: Awake, alert, sitting comfortably in bed  Orientation: Oriented to person, location (\"[facility]\"), Month and year  Was able to recall that he came to the hospital due to seizures, but does not remember seizures      CRANIAL NERVES:  - II:  R HH present, blurry vision overall; improved with glasses  - III, IV, VI: TRACEY, EOMI to pursuit without nystagmus  - V: V1-V3 sensation intact bilaterally  - VII: Face muscles symmetric with smile and eye closure  - VIII: Intact to loud voice  - IX, X: Palate elevated symmetrically bilaterally, no hoarseness  - XI: 5/5 strength on shoulder shrugging bilaterally  - XII: Tongue midline without atrophy or fasciculation     MOTOR: Increased tone in LUE.      STRENGTH:RL  Deltoid             5          5-  Biceps              5          4+  Triceps             5          5                    5          5  [id number]  [id number]  DorsiFlex          5          4  [id number]     REFLEXES:        R           L  Biceps               2+       2+  Triceps              2+       2+  Brachioradialis  2+       2+  Patellar              3+       3+  Achilles             2+       2+        SENSORY: Intact to light touch UE and LE b/l  Home " Medications     Medication List      START taking these medications     amLODIPine 10 mg tablet; Commonly known as: Norvasc   aspirin 81 mg EC tablet   atorvastatin 40 mg tablet; Commonly known as: Lipitor; Take 1 tablet (40   mg) by mouth once daily at bedtime.   lacosamide 100 mg tablet; Commonly known as: Vimpat; Take 1 tablet (100   mg) by mouth every 12 hours.   levETIRAcetam 750 mg tablet; Commonly known as: Keppra; Take 2 tablets   (1,500 mg) by mouth 2 times a day.   lisinopril 20 mg tablet; Take 1 tablet (20 mg) by mouth once daily.   nitrofurantoin (macrocrystal-monohydrate) 100 mg capsule; Commonly known   as: Macrobid; Take 1 capsule (100 mg) by mouth every 12 hours for 5 doses.   pantoprazole 40 mg EC tablet; Commonly known as: ProtoNix; Take 1 tablet   (40 mg) by mouth once daily in the morning. Take before meals. Do not   crush, chew, or split.     CONTINUE taking these medications     acetaminophen 500 mg tablet; Commonly known as: Tylenol     STOP taking these medications     gabapentin 100 mg capsule; Commonly known as: Neurontin   mirtazapine 7.5 mg tablet; Commonly known as: Remeron       Outpatient Follow-Up  Future Appointments   Date Time Provider Department Center   6/3/2025  9:00 AM Linda Morgan MD [id number] Harris Sims DO

## 2025-02-25 NOTE — PROGRESS NOTES
Occupational Therapy                 Therapy Communication Note    Patient Name: Josiah James  MRN: [id number]  Department: [place] [address]  Room: [address] [place] [address]  Today's Date: 2/25/2025     Discipline: Occupational Therapy    OT Missed Visit: Yes     Missed Visit Reason: Missed Visit Reason:  (pending d/c today)    Missed Time: Attempt    Comment:

## 2025-02-25 NOTE — CARE PLAN
The patient's goals for the shift include      The clinical goals for the shift include pt will remain safe and neurologically stable throughout shift

## 2025-02-26 NOTE — NURSING NOTE
Pt discharged to [facility]. Transport team made aware this RN has been unsuccessful to get [facility] of facility to call report. Transport team given report on pt. Pt IV removed and all pt belongings remain with pt at time of discharge.

## 2025-03-06 ENCOUNTER — HOSPITAL ENCOUNTER (EMERGENCY)
Facility: HOSPITAL | Age: 65
Discharge: HOME | End: 2025-03-07
Attending: EMERGENCY MEDICINE
Payer: MEDICARE

## 2025-03-06 ENCOUNTER — HOSPITAL ENCOUNTER (OUTPATIENT)
Dept: CARDIOLOGY | Facility: HOSPITAL | Age: 65
Discharge: HOME | End: 2025-03-06
Payer: MEDICARE

## 2025-03-06 DIAGNOSIS — F22 DELUSIONS (MULTI): ICD-10-CM

## 2025-03-06 DIAGNOSIS — R46.89 AGGRESSIVE BEHAVIOR: Primary | ICD-10-CM

## 2025-03-06 LAB
ALBUMIN SERPL BCP-MCNC: 4 G/DL (ref 3.4–5)
ALP SERPL-CCNC: 110 U/L (ref 33–136)
ALT SERPL W P-5'-P-CCNC: 11 U/L (ref 10–52)
ANION GAP SERPL CALC-SCNC: 14 MMOL/L (ref 10–20)
APAP SERPL-MCNC: <10 UG/ML
AST SERPL W P-5'-P-CCNC: 16 U/L (ref 9–39)
BASOPHILS # BLD AUTO: 0.08 X10*3/UL (ref 0–0.1)
BASOPHILS NFR BLD AUTO: 1 %
BILIRUB DIRECT SERPL-MCNC: 0.1 MG/DL (ref 0–0.3)
BILIRUB SERPL-MCNC: 1 MG/DL (ref 0–1.2)
BUN SERPL-MCNC: 17 MG/DL (ref 6–23)
CALCIUM SERPL-MCNC: 9.2 MG/DL (ref 8.6–10.3)
CHLORIDE SERPL-SCNC: 108 MMOL/L (ref 98–107)
CO2 SERPL-SCNC: 22 MMOL/L (ref 21–32)
CREAT SERPL-MCNC: 1.27 MG/DL (ref 0.5–1.3)
EGFRCR SERPLBLD CKD-EPI 2021: 63 ML/MIN/1.73M*2
EOSINOPHIL # BLD AUTO: 0.04 X10*3/UL (ref 0–0.7)
EOSINOPHIL NFR BLD AUTO: 0.5 %
ERYTHROCYTE [DISTWIDTH] IN BLOOD BY AUTOMATED COUNT: 14.8 % (ref 11.5–14.5)
ETHANOL SERPL-MCNC: <10 MG/DL
GLUCOSE SERPL-MCNC: 96 MG/DL (ref 74–99)
HCT VFR BLD AUTO: 43.6 % (ref 41–52)
HGB BLD-MCNC: 14.8 G/DL (ref 13.5–17.5)
IMM GRANULOCYTES # BLD AUTO: 0.04 X10*3/UL (ref 0–0.7)
IMM GRANULOCYTES NFR BLD AUTO: 0.5 % (ref 0–0.9)
LYMPHOCYTES # BLD AUTO: 1.09 X10*3/UL (ref 1.2–4.8)
LYMPHOCYTES NFR BLD AUTO: 13.2 %
MCH RBC QN AUTO: 32.7 PG (ref 26–34)
MCHC RBC AUTO-ENTMCNC: 33.9 G/DL (ref 32–36)
MCV RBC AUTO: 96 FL (ref 80–100)
MONOCYTES # BLD AUTO: 0.49 X10*3/UL (ref 0.1–1)
MONOCYTES NFR BLD AUTO: 5.9 %
NEUTROPHILS # BLD AUTO: 6.54 X10*3/UL (ref 1.2–7.7)
NEUTROPHILS NFR BLD AUTO: 78.9 %
NRBC BLD-RTO: 0 /100 WBCS (ref 0–0)
PLATELET # BLD AUTO: 222 X10*3/UL (ref 150–450)
POTASSIUM SERPL-SCNC: 4.6 MMOL/L (ref 3.5–5.3)
PROT SERPL-MCNC: 6.6 G/DL (ref 6.4–8.2)
RBC # BLD AUTO: 4.53 X10*6/UL (ref 4.5–5.9)
SALICYLATES SERPL-MCNC: <3 MG/DL
SODIUM SERPL-SCNC: 139 MMOL/L (ref 136–145)
WBC # BLD AUTO: 8.3 X10*3/UL (ref 4.4–11.3)

## 2025-03-06 PROCEDURE — 93005 ELECTROCARDIOGRAM TRACING: CPT

## 2025-03-06 PROCEDURE — 99284 EMERGENCY DEPT VISIT MOD MDM: CPT | Performed by: EMERGENCY MEDICINE

## 2025-03-06 PROCEDURE — 85025 COMPLETE CBC W/AUTO DIFF WBC: CPT | Performed by: EMERGENCY MEDICINE

## 2025-03-06 PROCEDURE — 80320 DRUG SCREEN QUANTALCOHOLS: CPT | Performed by: EMERGENCY MEDICINE

## 2025-03-06 PROCEDURE — 36415 COLL VENOUS BLD VENIPUNCTURE: CPT | Performed by: EMERGENCY MEDICINE

## 2025-03-06 PROCEDURE — 80053 COMPREHEN METABOLIC PANEL: CPT | Performed by: EMERGENCY MEDICINE

## 2025-03-06 PROCEDURE — 80051 ELECTROLYTE PANEL: CPT | Performed by: EMERGENCY MEDICINE

## 2025-03-06 PROCEDURE — 80143 DRUG ASSAY ACETAMINOPHEN: CPT | Performed by: EMERGENCY MEDICINE

## 2025-03-06 PROCEDURE — 84450 TRANSFERASE (AST) (SGOT): CPT | Performed by: EMERGENCY MEDICINE

## 2025-03-06 ASSESSMENT — COLUMBIA-SUICIDE SEVERITY RATING SCALE - C-SSRS
6. HAVE YOU EVER DONE ANYTHING, STARTED TO DO ANYTHING, OR PREPARED TO DO ANYTHING TO END YOUR LIFE?: NO
2. HAVE YOU ACTUALLY HAD ANY THOUGHTS OF KILLING YOURSELF?: NO
1. IN THE PAST MONTH, HAVE YOU WISHED YOU WERE DEAD OR WISHED YOU COULD GO TO SLEEP AND NOT WAKE UP?: NO

## 2025-03-06 ASSESSMENT — PAIN DESCRIPTION - LOCATION: LOCATION: ANKLE

## 2025-03-06 ASSESSMENT — PAIN - FUNCTIONAL ASSESSMENT: PAIN_FUNCTIONAL_ASSESSMENT: 0-10

## 2025-03-06 ASSESSMENT — PAIN SCALES - GENERAL: PAINLEVEL_OUTOF10: 2

## 2025-03-06 ASSESSMENT — PAIN DESCRIPTION - ORIENTATION: ORIENTATION: LEFT
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
## 2025-03-07 VITALS
RESPIRATION RATE: 21 BRPM | SYSTOLIC BLOOD PRESSURE: 127 MMHG | HEART RATE: 89 BPM | BODY MASS INDEX: 24.29 KG/M2 | DIASTOLIC BLOOD PRESSURE: 89 MMHG | WEIGHT: 164 LBS | OXYGEN SATURATION: 98 % | TEMPERATURE: 97.7 F | HEIGHT: 69 IN

## 2025-03-07 SDOH — HEALTH STABILITY: MENTAL HEALTH: WISH TO BE DEAD (PAST 1 MONTH): NO

## 2025-03-07 SDOH — HEALTH STABILITY: MENTAL HEALTH: SUICIDAL BEHAVIOR (LIFETIME): NO

## 2025-03-07 SDOH — HEALTH STABILITY: MENTAL HEALTH: NON-SPECIFIC ACTIVE SUICIDAL THOUGHTS (PAST 1 MONTH): NO

## 2025-03-07 SDOH — HEALTH STABILITY: MENTAL HEALTH: DEPRESSION SYMPTOMS: NO PROBLEMS REPORTED OR OBSERVED.

## 2025-03-07 SDOH — HEALTH STABILITY: MENTAL HEALTH: ANXIETY SYMPTOMS: NO PROBLEMS REPORTED OR OBSERVED.

## 2025-03-07 ASSESSMENT — LIFESTYLE VARIABLES
PRESCIPTION_ABUSE_PAST_12_MONTHS: NO
SUBSTANCE_ABUSE_PAST_12_MONTHS: NO
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
## 2025-03-07 NOTE — PROGRESS NOTES
Emergency Medicine Transition of Care Note.    I received Josiah James in signout from Dr. Crawford.  Please see the previous ED provider note for all HPI, PE and MDM up to the time of signout. This is in addition to the primary record.    In brief Josiah James is an 64 y.o. male presenting for   Chief Complaint   Patient presents with    Aggressive Behavior     At the time of signout we were awaiting: EPAT    Diagnoses as of 03/07/25 0129   Aggressive behavior   Delusions (Multi)       Medical Decision Making  EPAT feels this progression dementia and not requiring acute psychiatric admission.  Feel has no medical admission criteria at this time and can be discharged back to his facility.  The patient does not go back to his facility.  I did call his listed power of .  Patient does not have decision-making capacity to refuse return to facility at this time.    Final diagnoses:   [R46.89] Aggressive behavior   [F22] Delusions (Multi)           Procedure  Procedures    Jacob Sarmiento MD

## 2025-03-07 NOTE — ED PROVIDER NOTES
HPI   Chief Complaint   Patient presents with   • Aggressive Behavior       HPI: []  64-year-old white male history of hypertension, dementia, seizure disorder, CHF comes in with aggressive behavior.  Patient is a nursing home resident apparently was aggressive towards staff and is delusional he thinks his family is attending take his house away.  He denies any chest pain pressure heaviness fever chills nausea vomit diarrhea cough congestion incontinence seizures syncope or near syncope no hematemesis melena hematochezia no hemoptysis.  No recent travel or hospitalization.    Past history: Hypertension, dyslipidemia, seizure disorder, CHF  Social: Patient denies current tobacco alcohol drug use.  REVIEW OF SYSTEMS:    GENERAL.: No weight loss, fatigue, anorexia, insomnia, fever.    EYES: No vision loss, double vision, drainage, eye pain.    ENT: No pharyngitis, dry mouth.    CARDIOPULMONARY: No chest pain, palpitations, syncope, near syncope. No shortness of breath, cough, hemoptysis.    GI: No abdominal pain, change in bowel habits, melena, hematemesis, hematochezia, nausea, vomiting, diarrhea.    : No discharge, dysuria, frequency, urgency, hematuria.    MS: No limb pain, joint pain, joint swelling.    SKIN: No rashes.    PSYCH: No depression, anxiety, suicidality, homicidality.  Positive aggressive behavior, positive for delusions    Review of systems is otherwise negative unless stated above or in history of present illness.  Social history, family history, allergies reviewed.  PHYSICAL EXAM:    GENERAL: Vitals noted, no distress. Alert and oriented  x 1 very pleasant and cooperative. Non-toxic.      EENT: TMs clear. Posterior oropharynx unremarkable. No meningismus. No LAD.     NECK: Supple. Nontender. No midline tenderness.     CARDIAC: Regular, rate, rhythm. No murmurs rubs or gallops. No JVD    PULMONARY: Lungs clear bilaterally with good aeration. No wheezes rales or rhonchi. No respiratory distress.      ABDOMEN: Soft, nonsurgical. Nontender. No peritoneal signs. Normoactive bowel sounds. No pulsatile masses.     EXTREMITIES: No peripheral edema. Negative Homans bilaterally, no cords.  2+ bounding pulses well-perfused.    SKIN: No rash. Intact.  Scar from spinal decompression in the cervical upper thoracic area.    NEURO: Patient appears to be his baseline neurologic status appears to have some chronic cervical myelopathy with upper extremity weakness which was of chronic nature with some contractures of his upper lower extremities.    MEDICAL DECISION MAKING:  CBC with differential chemistry LFTs are normal serum drug screen negative urinalysis urine drug screen is pending.  Vimpat and Keppra levels pending.    Treatment in ED: IV established and cardiac monitor.    ED course: Patient medically cleared    Consult: EPAT consulted    Impression: #1 delusions, #2 aggressive behavior    Plan set MDM: 64-year-old white male history of hypertension seizure disorder comes in with delusions and aggressive behavior, while in the ED he is calm and cooperative, medically cleared, low concern for infection dehydration sepsis septic shock urinalysis pending, low suspicion for stroke or TIA, patient will be signed to oncoming colleague pending EPAT evaluation UA results reevaluation and final disposition.              Patient History   History reviewed. No pertinent past medical history.  History reviewed. No pertinent surgical history.  No family history on file.  Social History     Tobacco Use   • Smoking status: Not on file   • Smokeless tobacco: Not on file   Substance Use Topics   • Alcohol use: Not on file   • Drug use: Not on file       Physical Exam   ED Triage Vitals [03/06/25 1505]   Temperature Heart Rate Respirations BP   36.5 °C (97.7 °F) (!) 102 16 (!) 145/109      Pulse Ox Temp src Heart Rate Source Patient Position   98 % -- -- --      BP Location FiO2 (%)     -- --       Physical Exam      ED Course & MDM    Diagnoses as of 03/06/25 2330   Aggressive behavior   Delusions (Multi)                 No data recorded     [place] Coma Scale Score: 14 (03/06/25 1833 : Linda Alanis RN)                           Medical Decision Making      Procedure  Procedures     Levi Crawford MD  03/06/25 2207

## 2025-03-25 ENCOUNTER — APPOINTMENT (OUTPATIENT)
Dept: RADIOLOGY | Facility: HOSPITAL | Age: 65
End: 2025-03-25
Payer: MEDICARE

## 2025-03-25 ENCOUNTER — HOSPITAL ENCOUNTER (EMERGENCY)
Facility: HOSPITAL | Age: 65
Discharge: HOME | End: 2025-03-25
Attending: EMERGENCY MEDICINE
Payer: MEDICARE

## 2025-03-25 VITALS
SYSTOLIC BLOOD PRESSURE: 116 MMHG | BODY MASS INDEX: 25.11 KG/M2 | HEART RATE: 83 BPM | TEMPERATURE: 98 F | DIASTOLIC BLOOD PRESSURE: 93 MMHG | OXYGEN SATURATION: 97 % | HEIGHT: 67 IN | RESPIRATION RATE: 16 BRPM | WEIGHT: 160 LBS
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
## 2025-03-25 PROCEDURE — 99284 EMERGENCY DEPT VISIT MOD MDM: CPT | Mod: 25 | Performed by: EMERGENCY MEDICINE

## 2025-03-25 PROCEDURE — 70450 CT HEAD/BRAIN W/O DYE: CPT

## 2025-03-25 PROCEDURE — 72125 CT NECK SPINE W/O DYE: CPT

## 2025-03-25 PROCEDURE — 70450 CT HEAD/BRAIN W/O DYE: CPT | Performed by: RADIOLOGY

## 2025-03-25 PROCEDURE — 72125 CT NECK SPINE W/O DYE: CPT | Performed by: RADIOLOGY

## 2025-03-25 ASSESSMENT — PAIN - FUNCTIONAL ASSESSMENT: PAIN_FUNCTIONAL_ASSESSMENT: 0-10

## 2025-03-25 ASSESSMENT — COLUMBIA-SUICIDE SEVERITY RATING SCALE - C-SSRS
6. HAVE YOU EVER DONE ANYTHING, STARTED TO DO ANYTHING, OR PREPARED TO DO ANYTHING TO END YOUR LIFE?: NO
1. IN THE PAST MONTH, HAVE YOU WISHED YOU WERE DEAD OR WISHED YOU COULD GO TO SLEEP AND NOT WAKE UP?: NO
2. HAVE YOU ACTUALLY HAD ANY THOUGHTS OF KILLING YOURSELF?: NO

## 2025-03-25 ASSESSMENT — PAIN SCALES - GENERAL: PAINLEVEL_OUTOF10: 0 - NO PAIN

## 2025-03-25 NOTE — ED PROVIDER NOTES
HPI   Chief Complaint   Patient presents with    Fall       HPI: []  64-year-old white male with history of hypertension, dyslipidemia, seizure disorder nursing home resident with contractures lower extremities almost bedbound comes in mechanical fall.  Witnessed fall hit his left temple on the floor no LOC.  No neck pain.  No change metastatic.  Patient denies chest pain pressure heaviness fever chills nausea vomit diarrhea cough congestion incontinence seizures syncope or near syncope denies taking blood thinners.    Past history: Hypertension, dyslipidemia, seizure disorder, generalized weakness  Social: Patient denies current tobacco alcohol drug abuse.      REVIEW OF SYSTEMS:    GENERAL.: No weight loss, fatigue, anorexia, insomnia, fever.  Positive fall    EYES: No vision loss, double vision, drainage, eye pain.    ENT: No pharyngitis, dry mouth.    CARDIOPULMONARY: No chest pain, palpitations, syncope, near syncope. No shortness of breath, cough, hemoptysis.    GI: No abdominal pain, change in bowel habits, melena, hematemesis, hematochezia, nausea, vomiting, diarrhea.    : No discharge, dysuria, frequency, urgency, hematuria.    MS: No limb pain, joint pain, joint swelling.    SKIN: No rashes.  Positive abrasion left temporal    PSYCH: No depression, anxiety, suicidality, homicidality.    Review of systems is otherwise negative unless stated above or in history of present illness.  Social history, family history, allergies reviewed.  PHYSICAL EXAM:    GENERAL: Vitals noted, no distress. Alert and oriented  x 3. Non-toxic.  Chronically ill-appearing    EENT: TMs clear. Posterior oropharynx unremarkable. No meningismus. No LAD.  Small abrasion identified above left eyebrow  Eyes: Pupils equal round and reactive accommodation EOMs are intact  NECK: Supple. Nontender. No midline tenderness.     CARDIAC: Regular, rate, rhythm. No murmurs rubs or gallops. No JVD    PULMONARY: Lungs clear bilaterally with good  aeration. No wheezes rales or rhonchi. No respiratory distress.     ABDOMEN: Soft, nonsurgical. Nontender. No peritoneal signs. Normoactive bowel sounds. No pulsatile masses.     EXTREMITIES: No peripheral edema. Negative Homans bilaterally, no cords.  2+ bounding pulses well-perfused.  Musculoskeletal: Patient has contractures lower extremities at his baseline.  SKIN: No rash. Intact.     NEURO: No focal neurologic deficits, NIH score of 0. Cranial nerves normal as tested from II through XII.     MEDICAL DECISION MAKING:  CT head was done which shows no new changes CT C-spine showed DJD no fractures.    Treatment in the ED: Patient placed on a cardiac monitor.  ED course: Patient remains asymptomatic  Impression: #1 mechanical fall, #2 scalp abrasion  Plan/MDM: 64year-old white male history of hypertension dyslipidemia seizure disorder comes in with a mechanical fall with no obvious injuries.  Has small abrasion to the scalp no seizure.  Low suspicion for stroke TIA infection dehydration, patient is at his baseline neurologic status low concern for any metabolic derangements, patient be discharged back to his nursing facility local wound care fall precautions with strict return precaution.                Patient History   No past medical history on file.  No past surgical history on file.  No family history on file.  Social History     Tobacco Use    Smoking status: Not on file    Smokeless tobacco: Not on file   Substance Use Topics    Alcohol use: Not on file    Drug use: Not on file       Physical Exam   ED Triage Vitals [03/25/25 0747]   Temperature Heart Rate Respirations BP   36.7 °C (98 °F) 91 19 (!) 137/108      Pulse Ox Temp Source Heart Rate Source Patient Position   95 % Oral -- --      BP Location FiO2 (%)     -- --       Physical Exam      ED Course & MDM   ED Course as of 03/25/25 1402   Tue Mar 25, 2025   1001 CT head and CT C-spine shows no traumatic injury.  Patient at baseline mental status will  be discharged back to nursing facility with fall precautions [MT]      ED Course User Index  [MT] Levi Crawford MD         Diagnoses as of 03/25/25 1402   Fall, initial encounter                 No data recorded     [place] Coma Scale Score: 15 (03/25/25 1216 : Jinny Ferrell RN)                           Medical Decision Making      Procedure  Procedures     Levi Crawford MD  03/25/25 1943

## 2025-03-25 NOTE — ED TRIAGE NOTES
Pt coming in today for a fall that occurred this morning. Is at the SNF for generalized weakness and usually doesn't walk on his own when got out of bed and hit his head. No thinners at this time.

## 2025-04-16 LAB
ATRIAL RATE: 88 BPM
P AXIS: 36 DEGREES
P OFFSET: 183 MS
P ONSET: 123 MS
PR INTERVAL: 178 MS
Q ONSET: 212 MS
QRS COUNT: 14 BEATS
QRS DURATION: 80 MS
QT INTERVAL: 358 MS
QTC CALCULATION(BAZETT): 433 MS
QTC FREDERICIA: 406 MS
R AXIS: -23 DEGREES
T AXIS: 48 DEGREES
T OFFSET: 391 MS
VENTRICULAR RATE: 88 BPM

## 2025-04-26 ENCOUNTER — HOSPITAL ENCOUNTER (EMERGENCY)
Facility: HOSPITAL | Age: 65
Discharge: HOME | End: 2025-04-26
Attending: EMERGENCY MEDICINE
Payer: MEDICARE

## 2025-04-26 ENCOUNTER — HOME HEALTH ADMISSION (OUTPATIENT)
Dept: HOME HEALTH SERVICES | Facility: HOME HEALTH | Age: 65
End: 2025-04-26
Payer: MEDICARE

## 2025-04-26 ENCOUNTER — APPOINTMENT (OUTPATIENT)
Dept: RADIOLOGY | Facility: HOSPITAL | Age: 65
End: 2025-04-26
Payer: MEDICARE

## 2025-04-26 VITALS
SYSTOLIC BLOOD PRESSURE: 120 MMHG | BODY MASS INDEX: 25.11 KG/M2 | OXYGEN SATURATION: 98 % | HEIGHT: 67 IN | WEIGHT: 160 LBS | RESPIRATION RATE: 16 BRPM | TEMPERATURE: 97.4 F | HEART RATE: 67 BPM | DIASTOLIC BLOOD PRESSURE: 90 MMHG

## 2025-04-26 DIAGNOSIS — M25.512 ACUTE PAIN OF LEFT SHOULDER: ICD-10-CM

## 2025-04-26 DIAGNOSIS — M25.562 ACUTE PAIN OF LEFT KNEE: ICD-10-CM
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
## 2025-04-26 PROCEDURE — 72190 X-RAY EXAM OF PELVIS: CPT

## 2025-04-26 PROCEDURE — 71045 X-RAY EXAM CHEST 1 VIEW: CPT | Performed by: RADIOLOGY

## 2025-04-26 PROCEDURE — 73502 X-RAY EXAM HIP UNI 2-3 VIEWS: CPT | Mod: LT

## 2025-04-26 PROCEDURE — 74176 CT ABD & PELVIS W/O CONTRAST: CPT | Performed by: RADIOLOGY

## 2025-04-26 PROCEDURE — 73564 X-RAY EXAM KNEE 4 OR MORE: CPT | Mod: 50

## 2025-04-26 PROCEDURE — 73502 X-RAY EXAM HIP UNI 2-3 VIEWS: CPT | Mod: LEFT SIDE | Performed by: RADIOLOGY

## 2025-04-26 PROCEDURE — 72190 X-RAY EXAM OF PELVIS: CPT | Performed by: RADIOLOGY

## 2025-04-26 PROCEDURE — 73564 X-RAY EXAM KNEE 4 OR MORE: CPT | Mod: BILATERAL PROCEDURE | Performed by: RADIOLOGY

## 2025-04-26 PROCEDURE — 73030 X-RAY EXAM OF SHOULDER: CPT | Mod: LEFT SIDE | Performed by: RADIOLOGY

## 2025-04-26 PROCEDURE — 73030 X-RAY EXAM OF SHOULDER: CPT | Mod: LT

## 2025-04-26 PROCEDURE — 99284 EMERGENCY DEPT VISIT MOD MDM: CPT | Mod: 25 | Performed by: EMERGENCY MEDICINE

## 2025-04-26 PROCEDURE — 71045 X-RAY EXAM CHEST 1 VIEW: CPT

## 2025-04-26 PROCEDURE — 74176 CT ABD & PELVIS W/O CONTRAST: CPT

## 2025-04-26 ASSESSMENT — PAIN DESCRIPTION - LOCATION: LOCATION: HIP

## 2025-04-26 ASSESSMENT — COLUMBIA-SUICIDE SEVERITY RATING SCALE - C-SSRS
2. HAVE YOU ACTUALLY HAD ANY THOUGHTS OF KILLING YOURSELF?: NO
1. IN THE PAST MONTH, HAVE YOU WISHED YOU WERE DEAD OR WISHED YOU COULD GO TO SLEEP AND NOT WAKE UP?: NO
6. HAVE YOU EVER DONE ANYTHING, STARTED TO DO ANYTHING, OR PREPARED TO DO ANYTHING TO END YOUR LIFE?: NO

## 2025-04-26 ASSESSMENT — PAIN SCALES - GENERAL
PAINLEVEL_OUTOF10: 6
PAINLEVEL_OUTOF10: 0 - NO PAIN

## 2025-04-26 ASSESSMENT — PAIN - FUNCTIONAL ASSESSMENT: PAIN_FUNCTIONAL_ASSESSMENT: 0-10

## 2025-04-26 NOTE — ED TRIAGE NOTES
Pt to the ED from home for mechanical fall out of bed, denies hitting head, pt complaining of left sided hip pain, pt a&o x2 at baseline, -thinners, -LOC

## 2025-04-26 NOTE — ED PROVIDER NOTES
"History of Present Illness     History provided by: Patient and EMS  Limitations to History: None  External Records Reviewed with Brief Summary:  ED discharge summary from 325/25 presentation for similar symptoms after similar fall, negative CT imaging, discharged home    HPI:  Josiah James is a 64 y.o. male past medical history of hypertension hyperlipidemia seizure disorder ANO x 2 at baseline who presents today for fall.  Per patient, he fell out of bed this morning onto his left side.  He endorses some left knee and left shoulder pain.  He denies any chest pain or shortness of breath, denies any abdominal pain nausea vomiting or diarrhea.  He does also endorse that he did not hit his head, did not lose consciousness.  He denies any difficulty swallowing or speaking.  He does endorse some right lower extremity weakness as well as recurrent falls, however, feels like he is \"okay at home\".  Patient is oriented to self and year but not month or place.    Physical Exam   Triage vitals:  T 36.3 °C (97.4 °F)  HR 64  /84  RR 16  O2 96 % None (Room air)    Physical Exam  Vitals and nursing note reviewed.   Constitutional:       General: He is not in acute distress.     Appearance: He is not diaphoretic.      Comments: Chronically ill-appearing, not acutely ill-appearing   HENT:      Head: Normocephalic and atraumatic.      Mouth/Throat:      Mouth: Mucous membranes are moist.      Pharynx: No oropharyngeal exudate or posterior oropharyngeal erythema.   Eyes:      General: No scleral icterus.     Extraocular Movements: Extraocular movements intact.      Pupils: Pupils are equal, round, and reactive to light.   Cardiovascular:      Rate and Rhythm: Normal rate and regular rhythm.      Pulses: Normal pulses.      Heart sounds: Normal heart sounds. No murmur heard.     No gallop.   Pulmonary:      Effort: Pulmonary effort is normal. No respiratory distress.      Breath sounds: Normal breath sounds. No stridor. No " wheezing, rhonchi or rales.   Abdominal:      General: Bowel sounds are normal. There is no distension.      Palpations: Abdomen is soft. There is no mass.      Tenderness: There is no abdominal tenderness.      Hernia: No hernia is present.   Musculoskeletal:         General: No swelling, deformity or signs of injury. Normal range of motion.      Cervical back: Normal range of motion and neck supple. No tenderness.      Comments: Mild tenderness to palpation of the lateral anterior joint line with negative valgus varus stress test, negative anterior and posterior drawer test.  Mild tenderness palpation over left AC joint.  3-5 strength with dorsi flexion plantarflexion on the right, 5 out of 5 strength in dorsiflexion plantarflexion on the left   Skin:     General: Skin is warm.      Capillary Refill: Capillary refill takes less than 2 seconds.      Findings: No erythema, lesion or rash.   Neurological:      General: No focal deficit present.      Mental Status: He is alert. Mental status is at baseline.      Comments: At baseline, no changes in sensation on exam   Psychiatric:         Mood and Affect: Mood normal.         Behavior: Behavior normal.          Medical Decision Making & ED Course   Medical Decision Makin y.o. male past medical history of hypertension hyperlipidemia seizure disorder ANO x 2 with decline in mobility status who presents today for fall.  Patient initially complained of left knee pain, then right knee pain and then bilateral knee pain, so we will get x-rays of both.  Will also get x-ray of patient's left hip as well as his pelvis and left shoulder given that he did reportedly fall to his left side.  He did not hit his head and did not have any loss of consciousness, so I do not believe the patient requires any CT imaging as he is low risk based off of [place] CT scoring criteria.  Patient's imaging did demonstrate concern for possible nondisplaced left pubic fracture, so did get CT  abdomen pelvis without contrast to assess for bony abnormality.  I did also discussed the patient's case with the on-call orthopedics resident, who recommended inlet and outlet pelvic views in the chance that he did have a fracture.  Ultimately, the patient CT did not demonstrate evidence of fracture and the patient had no significant point tenderness, so my concern for fracture is relatively low.  Given this, we will discharge him at this time.  I did offer him pain medication, however, he did decline this and will plan to treat himself outpatient with over-the-counter Tylenol and ibuprofen.  All questions were answered prior to discharge, return precaution provided  ----      Differential diagnoses considered include but are not limited to: Periprosthetic hip fracture, pubic fracture, anterior shoulder dislocation, AC joint separation    Social Determinants of Health which Significantly Impact Care: Difficulty obtaining outpatient follow-up The following actions were taken to address these social determinants: Patient given referral to home health for treatment and evaluation by PT OT given decline in mobility    EKG Independent Interpretation: EKG not obtained    Independent Result Review and Interpretation: Relevant laboratory and radiographic results were reviewed and independently interpreted by myself.  As necessary, they are commented on in the ED Course.    Chronic conditions affecting the patient's care: As documented above in [facility]    The patient was discussed with the following consultants/services:  Orthopedics resident, who initially recommended inlet and outlet views, however, given no evidence of fracture, will not formally consult them as the patient does not require their services at this time.    Care Considerations: As documented above in [facility]    ED Course:  Diagnoses as of 04/26/25 0904   Fall, initial encounter   Acute pain of left shoulder   Acute pain of left knee     Disposition   As a result  of the work-up, the patient was discharged home.  he was informed of his diagnosis and instructed to come back with any concerns or worsening of condition.  he and was agreeable to the plan as discussed above.  he was given the opportunity to ask questions.  All of the patient's questions were answered.    Procedures   Procedures    Patient seen and discussed with ED attending physician.    Akash Montes De Oca MD  Emergency Medicine       Akash Montes De Oca MD  Resident  04/26/25 7887

## 2025-04-26 NOTE — DISCHARGE INSTRUCTIONS
You were seen today after you fell out of bed.  Initially, there was some concern that you may have a fracture of your pelvis, so we got a CT.  CT was negative for fracture.  Given this, you do not appear to have any injuries.  You can treat yourself with Tylenol and ibuprofen as tolerated at home, you can also use muscle relaxers as well as lidocaine patches.  Addition, I recommend that you get home care and have PT see you.

## 2025-05-02 ENCOUNTER — TELEPHONE (OUTPATIENT)
Dept: HOME HEALTH SERVICES | Facility: HOME HEALTH | Age: 65
End: 2025-05-02
Payer: MEDICARE

## 2025-05-02 NOTE — TELEPHONE ENCOUNTER
Hello,    Your recent home care referral for Josiah James has been made a Non Admit with  Home Care due to Inability to Contact Patient. Staff has called all available numbers and contacts for this patient over a 3-4 day period, left messages, and received no return call. If you have further questions, feel free to reach out to our office at [phone].     Thank you,   [facility]

## 2025-06-03 ENCOUNTER — APPOINTMENT (OUTPATIENT)
Dept: NEUROLOGY | Facility: CLINIC | Age: 65
End: 2025-06-03
Payer: MEDICARE